# Patient Record
Sex: MALE | Race: WHITE | Employment: FULL TIME | URBAN - METROPOLITAN AREA
[De-identification: names, ages, dates, MRNs, and addresses within clinical notes are randomized per-mention and may not be internally consistent; named-entity substitution may affect disease eponyms.]

---

## 2017-01-05 RX ORDER — ALLOPURINOL 100 MG/1
100 TABLET ORAL DAILY
Qty: 30 TAB | Refills: 0 | Status: SHIPPED | OUTPATIENT
Start: 2017-01-05 | End: 2017-02-20 | Stop reason: SDUPTHER

## 2017-01-05 RX ORDER — COLCHICINE 0.6 MG/1
0.6 TABLET ORAL DAILY
Qty: 30 TAB | Refills: 0 | Status: SHIPPED | OUTPATIENT
Start: 2017-01-05 | End: 2017-02-20 | Stop reason: SDUPTHER

## 2017-01-05 NOTE — TELEPHONE ENCOUNTER
Patient came in the office to let us know his gout pain is almost fully resolved-walking without difficulty expect for a sore spot. Plans on returning to work tomorrow.  He wants the Rx's for gout prevention that Dr Rosanne Owen discussed and his recent visit sent to [facility] on [address]

## 2017-04-29 DIAGNOSIS — I10 ESSENTIAL HYPERTENSION: ICD-10-CM

## 2017-05-01 RX ORDER — LISINOPRIL AND HYDROCHLOROTHIAZIDE 12.5; 2 MG/1; MG/1
TABLET ORAL
Qty: 60 TAB | Refills: 0 | Status: SHIPPED | OUTPATIENT
Start: 2017-05-01 | End: 2017-07-01 | Stop reason: SDUPTHER

## 2017-05-01 RX ORDER — ALLOPURINOL 100 MG/1
TABLET ORAL
Qty: 30 TAB | Refills: 0 | Status: SHIPPED | OUTPATIENT
Start: 2017-05-01 | End: 2017-07-01 | Stop reason: SDUPTHER

## 2017-05-01 NOTE — TELEPHONE ENCOUNTER
rx done and My Chart message to patient to report his readings.  His uric acid level was done end of August. He was in the office in December for acute visit

## 2017-07-01 DIAGNOSIS — I10 ESSENTIAL HYPERTENSION: ICD-10-CM

## 2017-07-03 RX ORDER — LISINOPRIL AND HYDROCHLOROTHIAZIDE 12.5; 2 MG/1; MG/1
TABLET ORAL
Qty: 60 TAB | Refills: 1 | Status: SHIPPED | OUTPATIENT
Start: 2017-07-03 | End: 2017-09-18 | Stop reason: SDUPTHER

## 2017-07-03 RX ORDER — ALLOPURINOL 100 MG/1
TABLET ORAL
Qty: 30 TAB | Refills: 1 | Status: SHIPPED | OUTPATIENT
Start: 2017-07-03 | End: 2017-09-18 | Stop reason: SDUPTHER

## 2017-07-03 NOTE — TELEPHONE ENCOUNTER
He is due for recheck on his uric acid level by the end of August- he is due for his every other year CHP this year and can schedule early AM appt and do everything at onc visit

## 2017-07-04 ENCOUNTER — HOSPITAL ENCOUNTER (EMERGENCY)
Age: 42
Discharge: HOME | End: 2017-07-04
Attending: EMERGENCY MEDICINE
Payer: COMMERCIAL

## 2017-07-04 VITALS
SYSTOLIC BLOOD PRESSURE: 138 MMHG | BODY MASS INDEX: 34.21 KG/M2 | HEIGHT: 74 IN | HEART RATE: 81 BPM | DIASTOLIC BLOOD PRESSURE: 95 MMHG | TEMPERATURE: 97.7 F | OXYGEN SATURATION: 99 % | RESPIRATION RATE: 16 BRPM | WEIGHT: 266.54 LBS

## 2017-07-04 DIAGNOSIS — M76.61 TENDONITIS, ACHILLES, RIGHT: Primary | ICD-10-CM

## 2017-07-04 PROCEDURE — 99282 EMERGENCY DEPT VISIT SF MDM: CPT

## 2017-07-04 RX ORDER — ACETAMINOPHEN AND CODEINE PHOSPHATE 120; 12 MG/5ML; MG/5ML
5-10 SOLUTION ORAL
Qty: 240 ML | Refills: 0 | Status: SHIPPED | OUTPATIENT
Start: 2017-07-04 | End: 2017-09-05 | Stop reason: ALTCHOICE

## 2017-07-04 NOTE — ED PROVIDER NOTES
HPI Comments: Jumana Damon is a 42 y.o. male with a pertinent PMHx of HTN and Gout who presents ambulatory to the ED c/o right ankle pain x 4 days. Pt notes that he has been trying to rest his right foot and has been taking Allopurinol and Colchine with no relief of symptoms. Pt explains that he has had a gout flare in his right foot before, but his current pain is different from his last gout flare. Pt notes that he had 2 gout flares in the past few months. Pt defers crutches at this time noting that he has them at home if needed. Pt denies any allergies to medications. Pt specifically denies cold symptoms, urinary symptoms, nor recent fall or trauma. Social hx: - Tobacco use, +(occ) EtOH use, - Illicit drug use    PCP: Oz Constantino MD  Orth[facility]: [facility]    There are no other complaints, changes or physical findings at this time. The history is provided by the patient. No  was used. Past Medical History:   Diagnosis Date    Advance directive discussed with patient 10/22/2015    IBS (irritable bowel syndrome)     Knee pain, acute 10/02/14     [facility]    Ruptured tendon 2/2014    left distal biceps tendon (WC injury)       Past Surgical History:   Procedure Laterality Date    HX ORTHOPAEDIC Left 1979    foot injury         Family History:   Problem Relation Age of Onset    Cancer Maternal Grandmother        Social History     Social History    Marital status:      Spouse name: N/A    Number of children: N/A    Years of education: N/A     Occupational History    Not on file.      Social History Main Topics    Smoking status: Never Smoker    Smokeless tobacco: Never Used    Alcohol use Yes      Comment: ; weekends    Drug use: No    Sexual activity: Not on file     Other Topics Concern    Not on file     Social History Narrative         ALLERGIES: Review of patient's allergies indicates no known allergies. Review of Systems   Constitutional: Negative. HENT: Negative for rhinorrhea, sinus pressure and sore throat. Eyes: Negative. Respiratory: Negative for cough. Gastrointestinal: Negative. Endocrine: Negative. Genitourinary: Negative for decreased urine volume, difficulty urinating, dysuria, frequency and urgency. Musculoskeletal: Positive for myalgias. Skin: Negative. Allergic/Immunologic: Negative. Neurological: Negative. Psychiatric/Behavioral: Negative. Vitals:    07/04/17 0935   BP: (!) 138/95   Pulse: 81   Resp: 16   Temp: 97.7 °F (36.5 °C)   SpO2: 99%   Weight: 120.9 kg (266 lb 8.6 oz)   Height: 6' 2\" (1.88 m)            Physical Exam   Constitutional: He is oriented to person, place, and time. He appears well-developed and well-nourished. No distress. 41 yo  male   HENT:   Head: Normocephalic and atraumatic. Eyes: Conjunctivae are normal. Right eye exhibits no discharge. Left eye exhibits no discharge. Neck: Normal range of motion. Neck supple. Cardiovascular: Normal rate, regular rhythm and normal heart sounds. No murmur heard. Pulmonary/Chest: Effort normal and breath sounds normal. No respiratory distress. He has no wheezes. Musculoskeletal:   R FOOT: No swelling, ecchymosis or deformity. TTP to the insertion of the Neetu's tendon on the calcaneous with pain on plantar flexion. Otherwise, NT and painless ROM. Distal NV intact. Cap refill < 2 sec. Ambualtory without limp. Neurological: He is alert and oriented to person, place, and time. Skin: Skin is warm and dry. He is not diaphoretic. Psychiatric: He has a normal mood and affect. His behavior is normal.   Nursing note and vitals reviewed.        MDM  Number of Diagnoses or Management Options  Tendonitis, Achilles, right:   Diagnosis management comments: DDx: tendinitis, gout, strain, sprain, Calcific tendinitis       Amount and/or Complexity of Data Reviewed  Review and summarize past medical records: yes    Patient Progress  Patient progress: stable    ED Course       Procedures    IMPRESSION:  1. Tendonitis, Achilles, right        PLAN:  1. Patient was advised to rest and elevate his right foot while applying apply ice to the affected area. Patient was instructed to limit his physical activity, and take OTC anti-inflammatory medications to relieve his symptoms. Discharge Medication List as of 7/4/2017  9:50 AM      START taking these medications    Details   ACETAMINOPHEN WITH CODEINE (ACETAMINOPHEN-CODEINE) 120-12 mg/5 mL soln Take 5-10 mL by mouth every four (4) hours as needed. Max Daily Amount: 60 mL., Print, Disp-240 mL, R-0         CONTINUE these medications which have NOT CHANGED    Details   lisinopril-hydroCHLOROthiazide (PRINZIDE, ZESTORETIC) 20-12.5 mg per tablet TAKE 1 TABLET BY MOUTH TWICE DAILY, Normal, Disp-60 Tab, R-1      allopurinol (ZYLOPRIM) 100 mg tablet TAKE 1 TABLET BY MOUTH DAILY, Normal, Disp-30 Tab, R-1      colchicine 0.6 mg tablet TAKE 1 TABLET BY MOUTH DAILY, Normal, Disp-30 Tab, R-5           2. Follow-up Information     Follow up With Details Comments [facility], MD In 1 week As needed [facility]  [address] [place] [address]      Raghu Hopson MD In 1 week As needed [place] [phone]          Return to ED if worse     DISCHARGE NOTE  9:5 AM  The patient has been re-evaluated and is ready for discharge. Reviewed available results with patient. Counseled patient on diagnosis and care plan. Patient has expressed understanding, and all questions have been answered. Patient agrees with plan and agrees to follow up as recommended, or return to the ED if their symptoms worsen.  Discharge instructions have been provided and explained to the patient, along with reasons to return to the ED.    ATTESTATION:  This note is prepared by Kim Seth, acting as Scribe for RUSH Han PA-C: The scribe's documentation has been prepared under my direction and personally reviewed by me in its entirety. I confirm that the note above accurately reflects all work, treatment, procedures, and medical decision making performed by me.

## 2017-07-04 NOTE — LETTER
[address] 
[facility] EMERGENCY DEPT 
[address] [place] [address] [address] [phone] 
[phone] Work/School Note Date: 7/4/2017 To Whom It May concern: 
 
Stephan Santos was seen and treated today in the emergency room by the following provider(s): 
Attending Provider: Nick Harp MD 
Physician Assistant: SANDY Chang. Please excuse Stephan Santos from work tomorrow. Sincerely, Lu Chang

## 2017-07-04 NOTE — DISCHARGE INSTRUCTIONS
Tendon Injury (Tendinopathy): Care Instructions  Your Care Instructions  Tendons are tough, flexible tissues that connect muscle to bone. A tendon can hurt or get torn from overuse or aging, especially tendons in the shoulder, elbow, wrist, hip, knee, or ankle. Tendon injuries may be called tendinopathy or tendinitis. Tendon injuries can occur from any motion you have to repeat in a job, sports, or daily activities. Tennis elbow is one common tendon injury. You can treat most tendon problems with over-the-counter pain medicine, rest, changes in your activities, and physical therapy. Follow-up care is a key part of your treatment and safety. Be sure to make and go to all appointments, and call your doctor if you are having problems. Its also a good idea to know your test results and keep a list of the medicines you take. How can you care for yourself at home? · Rest the sore area. You may have to stop doing the activity that caused the tendon pain for a while. · Take an over-the-counter pain medicine, such as acetaminophen (Tylenol), ibuprofen (Advil, Motrin), or naproxen (Aleve). Read and follow all instructions on the label. · Do not take two or more pain medicines at the same time unless the doctor told you to. Many pain medicines have acetaminophen, which is Tylenol. Too much acetaminophen (Tylenol) can be harmful. · Put ice or a cold pack on the sore area for 10 to 20 minutes at a time. Try to do this every 1 to 2 hours for the next 3 days (when you are awake) or until any swelling goes down. Put a thin cloth between the ice and your skin. · Prop up the sore area on a pillow when you ice it or anytime you sit or lie down during the next 3 days. Try to keep it above the level of your heart. This will help reduce swelling.   · Follow your doctor's advice for wearing and caring for a sling, splint, or cast. In some cases, you may wear one of these for a while to help your tendon heal.  · Follow your doctor's advice for stretching and physical therapy. Gently move your joint through its full range of motion. This will prevent stiffness in your joint. · Go back to your activity slowly. Warm up before and stretch after the activity. You also can try making some changes. For example, if a sport caused your tendon pain, alternate the sport with another activity. If using a tool causes pain, switch hands or change your . Stop the activity if it hurts. After the activity, apply ice to prevent pain and swelling. · Do not smoke. Smoking can slow healing. If you need help quitting, talk to your doctor about stop-smoking programs and medicines. These can increase your chances of quitting for good. When should you call for help? Watch closely for changes in your health, and be sure to contact your doctor if:  · Your pain gets worse. · You do not get better as expected. Where can you learn more? Go to http://yany-seble.info/. Enter A157 in the search box to learn more about \"Tendon Injury (Tendinopathy): Care Instructions. \"  Current as of: March 21, 2017  Content Version: 11.3  © 9401-5839 [employer]. Care instructions adapted under license by [employer] (which disclaims liability or warranty for this information). If you have questions about a medical condition or this instruction, always ask your healthcare professional. [address] any warranty or liability for your use of this information.

## 2017-07-05 ENCOUNTER — TELEPHONE (OUTPATIENT)
Dept: FAMILY MEDICINE CLINIC | Age: 42
End: 2017-07-05

## 2017-07-05 DIAGNOSIS — M76.60 ACHILLES TENDON PAIN: Primary | ICD-10-CM

## 2017-07-05 NOTE — TELEPHONE ENCOUNTER
Patient went to the ED yesterday for foot pain from achilles tendon issue. They gave a work note to excuse for 1 day. He is still in pain-advised to go to [facility] per ED recommendations. They need to see and evaluate and also give him extension on his work note. We are unable to see today to give extension on his work note since PCP is out of the office.

## 2017-07-29 ENCOUNTER — HOSPITAL ENCOUNTER (EMERGENCY)
Age: 42
Discharge: HOME | End: 2017-07-30
Attending: EMERGENCY MEDICINE | Admitting: EMERGENCY MEDICINE
Payer: COMMERCIAL

## 2017-07-29 VITALS
OXYGEN SATURATION: 100 % | WEIGHT: 269.4 LBS | HEIGHT: 74 IN | TEMPERATURE: 98.7 F | DIASTOLIC BLOOD PRESSURE: 89 MMHG | SYSTOLIC BLOOD PRESSURE: 153 MMHG | RESPIRATION RATE: 18 BRPM | HEART RATE: 95 BPM | BODY MASS INDEX: 34.57 KG/M2

## 2017-07-29 DIAGNOSIS — S01.511A LACERATION OF UPPER LIP, COMPLICATED, INITIAL ENCOUNTER: ICD-10-CM
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
## 2017-07-29 DIAGNOSIS — Z23 NEED FOR TETANUS BOOSTER: ICD-10-CM

## 2017-07-29 PROCEDURE — 77030002888 HC SUT CHRMC J&J -A

## 2017-07-29 PROCEDURE — 77030018836 HC SOL IRR NACL ICUM -A

## 2017-07-29 PROCEDURE — 99282 EMERGENCY DEPT VISIT SF MDM: CPT

## 2017-07-29 PROCEDURE — 90471 IMMUNIZATION ADMIN: CPT

## 2017-07-29 PROCEDURE — 75810000293 HC SIMP/SUPERF WND  RPR

## 2017-07-29 NOTE — LETTER
[address] 
[facility] EMER[facility]Y [facility] 
[address] [place] [address]. [address] [phone] Work/School Note Date: 7/29/2017 To Whom It May concern: 
 
Ja Lorenzana was seen and treated today in the emergency room by the following provider(s): 
Attending Provider: Rhys Kaplan MD 
Physician Assistant: SANDY Eddy. Ja Lorenzana may return to work on 8/2/17 or sooner, if feeling better. Sincerely, SANDY Eddy

## 2017-07-30 PROCEDURE — 74011000250 HC RX REV CODE- 250: Performed by: PHYSICIAN ASSISTANT

## 2017-07-30 PROCEDURE — 74011250637 HC RX REV CODE- 250/637

## 2017-07-30 PROCEDURE — 74011250636 HC RX REV CODE- 250/636: Performed by: PHYSICIAN ASSISTANT

## 2017-07-30 PROCEDURE — 90715 TDAP VACCINE 7 YRS/> IM: CPT | Performed by: PHYSICIAN ASSISTANT

## 2017-07-30 RX ORDER — TRIPROLIDINE/PSEUDOEPHEDRINE 2.5MG-60MG
600 TABLET ORAL
Qty: 1 BOTTLE | Refills: 0 | Status: SHIPPED | OUTPATIENT
Start: 2017-07-30 | End: 2017-09-05 | Stop reason: ALTCHOICE

## 2017-07-30 RX ORDER — AMOXICILLIN AND CLAVULANATE POTASSIUM 400; 57 MG/5ML; MG/5ML
875 POWDER, FOR SUSPENSION ORAL 2 TIMES DAILY
Qty: 154 ML | Refills: 0 | Status: SHIPPED | OUTPATIENT
Start: 2017-07-30 | End: 2017-09-05 | Stop reason: ALTCHOICE

## 2017-07-30 RX ADMIN — NEOMYCIN AND POLYMYXIN B SULFATES AND BACITRACIN ZINC 1 PACKET: 400; 3.5; 5 OINTMENT TOPICAL at 01:41

## 2017-07-30 RX ADMIN — TETANUS TOXOID, REDUCED DIPHTHERIA TOXOID AND ACELLULAR PERTUSSIS VACCINE, ADSORBED 0.5 ML: 5; 2.5; 8; 8; 2.5 SUSPENSION INTRAMUSCULAR at 01:30

## 2017-07-30 RX ADMIN — Medication 2 ML: at 01:32

## 2017-07-30 NOTE — ED PROVIDER NOTES
HPI Comments: Nicholas Jack, 42 y.o. male, with PMHx of IBS presents ambulatory to ED [facility] ED with cc of wound to upper lip. Pt states that he was sleeping and accidentally rolled over on his dog's tail, and that the dog bit his upper lip area approximately 20min PTA. Pt notes that his dog is UTD on all his vaccinations. Pt notes that he is not UTD on his tetanus shot. NKDA. Pt denies any significant health issues. Pt denies any other pain. PCP: Jerome Goldberg MD    Social history significant for: - Tobacco, + EtOH, - Illicit Drug Use    There are no other complaints, changes, or physical findings at this time. The history is provided by the patient. No  was used. Past Medical History:   Diagnosis Date    Advance directive discussed with patient 10/22/2015    IBS (irritable bowel syndrome)     Knee pain, acute 10/02/14     prepatellar bursitis [facility] 10/09/14    Ruptured tendon 2/2014    left distal biceps tendon (WC injury)       Past Surgical History:   Procedure Laterality Date    HX ORTHOPAEDIC Left 1979    foot injury         Family History:   Problem Relation Age of Onset    Cancer Maternal Grandmother        Social History     Social History    Marital status:      Spouse name: N/A    Number of children: N/A    Years of education: N/A     Occupational History    Not on file. Social History Main Topics    Smoking status: Never Smoker    Smokeless tobacco: Never Used    Alcohol use Yes      Comment: ; weekends    Drug use: No    Sexual activity: Not on file     Other Topics Concern    Not on file     Social History Narrative         ALLERGIES: Review of patient's allergies indicates no known allergies. Review of Systems   Constitutional: Negative. Negative for fever. HENT: Negative. Eyes: Negative. Respiratory: Negative. Cardiovascular: Negative. Gastrointestinal: Negative.   Negative for constipation, diarrhea, nausea and vomiting. Denies liver disease   Endocrine:        Denies thyroid disease   Genitourinary: Negative. Negative for dysuria. Denies kidney disease   Musculoskeletal: Negative. Skin: Positive for wound (upper lip). Neurological: Negative. All other systems reviewed and are negative. Patient Vitals for the past 12 hrs:   Temp Pulse Resp BP SpO2   07/29/17 2243 98.7 °F (37.1 °C) 95 18 153/89 100 %     Physical Exam   Constitutional: He is oriented to person, place, and time. He appears well-developed and well-nourished. No distress. HENT:   Head: Normocephalic and atraumatic. Right Ear: External ear normal.   Left Ear: External ear normal.   Nose: Nose normal.   Mouth/Throat: Oropharynx is clear and moist. No oropharyngeal exudate. Eyes: Conjunctivae and EOM are normal. Pupils are equal, round, and reactive to light. Right eye exhibits no discharge. Left eye exhibits no discharge. No scleral icterus. Neck: Normal range of motion. Neck supple. No tracheal deviation present. Cardiovascular: Normal rate, regular rhythm, normal heart sounds and intact distal pulses. Exam reveals no gallop and no friction rub. No murmur heard. Pulmonary/Chest: Effort normal and breath sounds normal. No respiratory distress. He has no wheezes. He has no rales. He exhibits no tenderness. Abdominal: Soft. Bowel sounds are normal. He exhibits no distension and no mass. There is no tenderness. There is no rebound and no guarding. Musculoskeletal: He exhibits no edema or tenderness. Lymphadenopathy:     He has no cervical adenopathy. Neurological: He is alert and oriented to person, place, and time. No cranial nerve deficit. Coordination normal.   Skin: Skin is warm and dry. No rash noted.  No erythema.   + V shaped laceration to left upper lip, does cross vermilion border  + Vertical linear laceration of left upper lip, adjacent to the V-shaped laceration   Psychiatric: He has a normal mood and affect. His behavior is normal. Judgment and thought content normal.   Nursing note and vitals reviewed. MDM  Number of Diagnoses or Management Options  Diagnosis management comments: DDx: dog bite, needs tetanus booster       Amount and/or Complexity of Data Reviewed  Review and summarize past medical records: yes    Patient Progress  Patient progress: stable    ED Course       Procedures    Procedure Note - Laceration Repair:  2:19 AM  Procedure by Harlee Hamman PA-C  Complexity: complex   1.5 cm v-shaped laceration to left upper lip was irrigated copiously with NS under jet lavage, prepped with Hibiclens and draped in a sterile fashion. The area was anesthetized with 2 mLs of  LET via topical application. The wound was explored with the following results: No foreign bodies found. The wound was repaired with One layer suture closure: Skin Layer:  7 sutures placed, stitch type:simple interrupted, suture: 6-0 plain gut. .  The wound was closed with good hemostasis and approximation. Sterile dressing applied. Vermillion border intact: no   Estimated blood loss: less than 1mL  The procedure took 1-15 minutes, and pt tolerated well. Written by Laurel Lopez ED Scribe, as dictated by Harlee Hamman PA-C. Procedure Note - Laceration Repair:  2:19 AM  Procedure by Harlee Hamman PA-C  Complexity: complex   7mm linear laceration to left upper lip was irrigated copiously with NS under jet lavage, prepped with Hibiclens and draped in a sterile fashion. The area was anesthetized with 2 mLs of  LET via topical application. The wound was explored with the following results: No foreign bodies found. The wound was repaired with One layer suture closure: Skin Layer:  7 sutures placed, stitch type:simple interrupted, suture: 6-0 plain gut. The wound was closed with good hemostasis and approximation. Sterile dressing applied.   Vermillion border intact: no   Estimated blood loss: less than 1mL  The procedure took 1-15 minutes, and pt tolerated well. Written by Kristy Rollins, ED Scribe, as dictated by Ceci Glasgow PA-C.        MEDICATIONS GIVEN:  Medications   lidocaine/EPINEPHrine/tetracaine (LET) topical solution soln (  Canceled Entry 7/30/17 0141)   diph,Pertuss(AC),Tet Vac-PF (BOOSTRIX) 2.5-8-5 Lf-mcg suspension (  Canceled Entry 7/30/17 0141)   neomycin-bacitracnZn-polymyxnB (NEOSPORIN) ointment 1 Packet (1 Packet Topical Given by Provider 7/30/17 0141)   diph,Pertuss(AC),Tet Vac-PF (BOOSTRIX) suspension 0.5 mL (0.5 mL IntraMUSCular Given 7/30/17 0130)   lidocaine/EPINEPHrine/tetracaine (LET) topical soln (2 mL Topical Given by Provider 7/30/17 0132)       IMPRESSION:  1. Dog bite, initial encounter    2. Laceration of upper lip, complicated, initial encounter    3. Need for tetanus booster        PLAN:  1. Current Discharge Medication List      START taking these medications    Details   neomycin-bacitracin-polymyxin (NEOSPORIN, [id number],) 3.5mg-400 unit- 5,000 unit/gram ointment Apply  to affected area three (3) times daily for 10 days. Apply to affected area  Qty: 1 Tube, Refills: 0      amoxicillin-clavulanate (AUGMENTIN) 400-57 mg/5 mL suspension Take 10.9 mL by mouth two (2) times a day. Take at the first sign of infection (redness/heat/pus)  Indications: DOG BITE WOUND  Qty: 154 mL, Refills: 0      ibuprofen (CHILDREN'S MOTRIN) 100 mg/5 mL suspension Take 30 mL by mouth four (4) times daily as needed for Fever. Qty: 1 Bottle, Refills: 0           2. Follow-up Information     None        Return to ED if worse     Discharge Note:  2:51 AM  The pt is ready for discharge. The pt's signs, symptoms, diagnosis, and discharge instructions have been discussed and pt has conveyed their understanding. The pt is to follow up as recommended or return to ER should their symptoms worsen. Plan has been discussed and pt is in agreement.     This note is prepared by Yadira Hester, acting as a Scribe for Tin Black Neeta Jacobson. Scott Holliday PA-C: The scribe's documentation has been prepared under my direction and personally reviewed by me in its entirety. I confirm that the notes above accurately reflects all work, treatment, procedures, and medical decision making performed by me.

## 2017-07-30 NOTE — DISCHARGE INSTRUCTIONS
DTaP (Diphtheria, Tetanus, Pertussis) Vaccine: What You Need to Know  Why get vaccinated? Diphtheria, tetanus, and pertussis are serious diseases caused by bacteria. Diphtheria and pertussis are spread from person to person. Tetanus enters the body through cuts or wounds. DIPHTHERIA causes a thick covering in the back of the throat. · It can lead to breathing problems, paralysis, heart failure, and even death. TETANUS (Lockjaw) causes painful tightening of the muscles, usually all over the body. · It can lead to \"locking\" of the jaw so the victim cannot open his mouth or swallow. Tetanus leads to death in up to 2 out of 10 cases. PERTUSSIS (Whooping Cough) causes coughing spells so bad that it is hard for infants to eat, drink, or breathe. These spells can last for weeks. · It can lead to pneumonia, seizures (jerking and staring spells), brain damage, and death. Diphtheria, tetanus, and pertussis vaccine (DTaP) can help prevent these diseases. Most children who are vaccinated with DTaP will be protected throughout childhood. Many more children would get these diseases if we stopped vaccinating. DTaP is a safer version of an older vaccine called DTP. DTP is no longer used in the [place]. Who should get DTaP vaccine and when? Children should get 5 doses of DTaP vaccine, one dose at each of the following ages:  · 2 months  · 4 months  · 6 months  · 15-18 months  · 4-6 years  DTaP may be given at the same time as other vaccines. Some children should not get DTaP vaccine or should wait. · Children with minor illnesses, such as a cold, may be vaccinated. But children who are moderately or severely ill should usually wait until they recover before getting DTaP vaccine. · Any child who had a life-threatening allergic reaction after a dose of DTaP should not get another dose.   · Any child who suffered a brain or nervous system disease within 7 days after a dose of DTaP should not get another dose.  · Talk with your doctor if your child:  Angeli Gaines Had a seizure or collapsed after a dose of DTaP. ¨ Cried non-stop for 3 hours or more after a dose of DTaP. ¨ Had a fever over 105°F after a dose of DTaP. Ask your doctor for more information. Some of these children should not get another dose of pertussis vaccine, but may get a vaccine without pertussis, called DT. Older children and adults  DTaP is not licensed for adolescents, adults, or children 9years of age and older. But older people still need protection. A vaccine called Tdap is similar to DTaP. A single dose of Tdap is recommended for people 11 through 59years of age. Another vaccine, called Td, protects against tetanus and diphtheria, but not pertussis. It is recommended every 10 years. There are separate Vaccine Information Statements for these vaccines. What are the risks from DTaP vaccine? Getting diphtheria, tetanus, or pertussis disease is much riskier than getting DTaP vaccine. However, a vaccine, like any medicine, is capable of causing serious problems, such as severe allergic reactions. The risk of DTaP vaccine causing serious harm, or death, is extremely small. Mild Problems (Common)  · Fever (up to about 1 child in 4)  · Redness or swelling where the shot was given (up to about 1 child in 4)  · Soreness or tenderness where the shot was given (up to about 1 child in 4)  These problems occur more often after the 4th and 5th doses of the DTaP series than after earlier doses. Sometimes the 4th or 5th dose of DTaP vaccine is followed by swelling of the entire arm or leg in which the shot was given, lasting 1-7 days (up to about 1 child in 27). Other mild problems include:  · Fussiness (up to about 1 child in 3)  · Tiredness or poor appetite (up to about 1 child in 10)  · Vomiting (up to about 1 child in 48)  These problems generally occur 1-3 days after the shot.   Moderate Problems (Uncommon)  · Seizure (jerking or staring) (about 1 child out of 14,000)  · Non-stop crying, for 3 hours or more (up to about 1 child out of 1,000)  · High fever, over 105°F (about 1 child out of 16,000)  Severe Problems (Very Rare)  · Serious allergic reaction (less than 1 out of a million doses)  · Several other severe problems have been reported after DTaP vaccine. These include:  ¨ Long-term seizures, coma, or lowered consciousness. ¨ Permanent brain damage. These are so rare it is hard to tell if they are caused by the vaccine. Controlling fever is especially important for children who have had seizures, for any reason. It is also important if another family member has had seizures. You can reduce fever and pain by giving your child an aspirin-free pain reliever when the shot is given, and for the next 24 hours, following the package instructions. What if there is a serious reaction? What should I look for? · Look for anything that concerns you, such as signs of a severe allergic reaction, very high fever, or behavior changes. Signs of a severe allergic reaction can include hives, swelling of the face and throat, difficulty breathing, a fast heartbeat, dizziness, and weakness. These would start a few minutes to a few hours after the vaccination. What should I do? · If you think it is a severe allergic reaction or other emergency that can't wait, call 9-1-1 or get the person to the nearest hospital. Otherwise, call your doctor. · Afterward, the reaction should be reported to the Vaccine Adverse Event Reporting System (VAERS). Your doctor might file this report, or you can do it yourself through the VAERS web site at www.vaers. hhs.gov, or by calling 0-211.601.7012. VAERS is only for reporting reactions. They do not give medical advice. The National Vaccine Injury Compensation Program  The National Vaccine Injury Compensation Program (VICP) is a federal program that was created to compensate people who may have been injured by certain vaccines.   Persons who believe they may have been injured by a vaccine can learn about the program and about filing a claim by calling 6-156.776.4164 or visiting the [address] [employer] website at www.[facility].gov/vaccinecompensation. How can I learn more? · Ask your doctor. · Call your local or state health department. · Contact the Centers for Disease Control and Prevention (CDC):  ¨ Call 2-145.274.4048 (1-800-CDC-INFO) or  ¨ Visit CDC's website at www.cdc.gov/vaccines  Vaccine Information Statement  DTaP (Tetanus, Diphtheria, Pertussis ) Vaccine  (5/17/2007)  [address] [id number]  Department of Health and Human Services  Centers for Disease Control and Prevention  Many Vaccine Information Statements are available in Mongolian and other languages. See www.immunize.org/vis. Muchas hojas de información sobre vacunas están disponibles en español y en otros idiomas. Visite www.immunize.org/vis. Care instructions adapted under license by [employer] (which disclaims liability or warranty for this information). If you have questions about a medical condition or this instruction, always ask your healthcare professional. [place] [address] any warranty or liability for your use of this information. Animal Bites: Care Instructions  Your Care Instructions  After an animal bite, the biggest concern is infection. The chance of infection depends on the type of animal that bit you, where on your body you were bitten, and your general health. Many animal bites are not closed with stitches, because this can increase the chance of infection. Your bite may take as little as 7 days or as long as several months to heal, depending on how bad it is. Taking good care of your wound at home will help it heal and reduce your chance of infection. The doctor has checked you carefully, but problems can develop later. If you notice any problems or new symptoms, get medical treatment right away.   Follow-up care is a key part of your treatment and safety. Be sure to make and go to all appointments, and call your doctor if you are having problems. It's also a good idea to know your test results and keep a list of the medicines you take. How can you care for yourself at home? · If your doctor told you how to care for your wound, follow your doctor's instructions. If you did not get instructions, follow this general advice:  ¨ After 24 to 48 hours, gently wash the wound with clean water 2 times a day. Do not scrub or soak the wound. Don't use hydrogen peroxide or alcohol, which can slow healing. ¨ You may cover the wound with a thin layer of petroleum jelly, such as Vaseline, and a nonstick bandage. ¨ Apply more petroleum jelly and replace the bandage as needed. · After you shower, gently dry the wound with a clean towel. · If your doctor has closed the wound, cover the bandage with a plastic bag before you take a shower. · A small amount of skin redness and swelling around the wound edges and the stitches or staples is normal. Your wound may itch or feel irritated. Do not scratch or rub the wound. · Ask your doctor if you can take an over-the-counter pain medicine, such as acetaminophen (Tylenol), ibuprofen (Advil, Motrin), or naproxen (Aleve). Read and follow all instructions on the label. · Do not take two or more pain medicines at the same time unless the doctor told you to. Many pain medicines have acetaminophen, which is Tylenol. Too much acetaminophen (Tylenol) can be harmful. · If your bite puts you at risk for rabies, you will get a series of shots over the next few weeks to prevent rabies. Your doctor will tell you when to get the shots. It is very important that you get the full cycle of shots. Follow your doctor's instructions exactly. · You may need a tetanus shot if you have not received one in the last 5 years. · If your doctor prescribed antibiotics, take them as directed. Do not stop taking them just because you feel better.  You need to take the full course of antibiotics. When should you call for help? Call your doctor now or seek immediate medical care if:  · The skin near the bite turns cold or pale or it changes color. · You lose feeling in the area near the bite, or it feels numb or tingly. · You have trouble moving a limb near the bite. · You have symptoms of infection, such as:  ¨ Increased pain, swelling, warmth, or redness near the wound. ¨ Red streaks leading from the wound. ¨ Pus draining from the wound. ¨ A fever. · Blood soaks through the bandage. Oozing small amounts of blood is normal.  · Your pain is getting worse. Watch closely for changes in your health, and be sure to contact your doctor if you are not getting better as expected. Where can you learn more? Go to http://yany-seble.info/. Enter U571 in the search box to learn more about \"Animal Bites: Care Instructions. \"  Current as of: March 20, 2017  Content Version: 11.3  © 1174-8273 [employer]. Care instructions adapted under license by [employer] (which disclaims liability or warranty for this information). If you have questions about a medical condition or this instruction, always ask your healthcare professional. [address] any warranty or liability for your use of this information.

## 2017-07-30 NOTE — ED NOTES
Assumed care of pt from triage. Pt complaining of upper lip dog bite. Pt was bitten by his own dog and dog is UTD on vaccines. Pt did not notify animal control. Will notify [address]. Pt has hx of HTN and gout. Pt in no acute distress at this time. Call bell within reach.

## 2017-07-30 NOTE — ED NOTES
Notified [facility] of dog bite. Spoke with dispatcher. Animal control off duty at this time. Dispatcher took report and [place] [employer] to follow up with pt tomorrow.

## 2017-09-05 ENCOUNTER — OFFICE VISIT (OUTPATIENT)
Dept: FAMILY MEDICINE CLINIC | Age: 42
End: 2017-09-05

## 2017-09-05 VITALS
TEMPERATURE: 98.2 F | SYSTOLIC BLOOD PRESSURE: 133 MMHG | OXYGEN SATURATION: 97 % | RESPIRATION RATE: 18 BRPM | HEART RATE: 75 BPM | HEIGHT: 74 IN | DIASTOLIC BLOOD PRESSURE: 87 MMHG | WEIGHT: 270.5 LBS | BODY MASS INDEX: 34.72 KG/M2

## 2017-09-05 DIAGNOSIS — Z00.00 LABORATORY EXAM ORDERED AS PART OF ROUTINE GENERAL MEDICAL EXAMINATION: ICD-10-CM

## 2017-09-05 DIAGNOSIS — E66.9 OBESITY, CLASS I, BMI 30-34.9: ICD-10-CM

## 2017-09-05 DIAGNOSIS — R79.89 LOW VITAMIN D LEVEL: ICD-10-CM

## 2017-09-05 DIAGNOSIS — I10 ESSENTIAL HYPERTENSION: ICD-10-CM

## 2017-09-05 DIAGNOSIS — Z23 ENCOUNTER FOR IMMUNIZATION: ICD-10-CM

## 2017-09-05 DIAGNOSIS — Z00.00 PHYSICAL EXAM: Primary | ICD-10-CM

## 2017-09-05 DIAGNOSIS — M25.561 ACUTE PAIN OF RIGHT KNEE: ICD-10-CM

## 2017-09-05 RX ORDER — CHOLECALCIFEROL (VITAMIN D3) 125 MCG
440 CAPSULE ORAL AS NEEDED
COMMUNITY
End: 2017-09-13

## 2017-09-05 NOTE — MR AVS SNAPSHOT
Visit Information Date & Time Provider Department Dept. Phone Encounter #  
 9/5/2017  9:00 AM Juanita Pemberton MD [facility] [phone] [id number] Follow-up Instructions Return in about 1 year (around 9/5/2018) for Annual OV labs. Your Appointments 9/11/2017 12:00 PM  
New Patient with Huey Dinero PsyD [address] ([address]) Appt Note: NP/retest for ADHD/ pt made appt; [facility] [place] [facility] [address] [facility] [address] [facility] [address] [id number] [phone]  
  
   
 [facility] [address] [facility] [address] [address] [address] Upcoming Health Maintenance Date Due INFLUENZA AGE 9 TO ADULT 12/4/2017* DTaP/Tdap/Td series (2 - Td) 7/30/2027 *Topic was postponed. The date shown is not the original due date. Allergies as of 9/5/2017  Review Complete On: 9/5/2017 By: Juanita Pemberton MD  
 No Known Allergies Current Immunizations  Reviewed on 9/5/2017 Name Date Influenza Vaccine 10/17/2015 Influenza Vaccine (Quad) PF 10/20/2014 Tdap 7/30/2017  1:30 AM, 8/6/2015 Reviewed by Eugenio Bhatt RN on 9/5/2017 at  9:29 AM  
You Were Diagnosed With   
  
 Codes Comments Physical exam    -  Primary ICD-10-CM: Z00.00 ICD-9-CM: V70.9 Essential hypertension     ICD-10-CM: I10 
ICD-9-CM: 401.9 Obesity, Class I, BMI 30-34.9     ICD-10-CM: E66.9 ICD-9-CM: 278.00 Laboratory exam ordered as part of routine general medical examination     ICD-10-CM: Z00.00 ICD-9-CM: V72.62 Acute pain of right knee     ICD-10-CM: M25.561 ICD-9-CM: 719.46 Low vitamin D level     ICD-10-CM: E55.9 ICD-9-CM: 268.9 Vitals BP Pulse Temp Resp Height(growth percentile) Weight(growth percentile) 133/87 (BP 1 Location: Right arm, BP Patient Position: Sitting) 75 98.2 °F (36.8 °C) (Oral) 18 6' 2\" (1.88 m) 270 lb 8 oz (122.7 kg) SpO2 BMI Smoking Status 97% 34.73 kg/m2 Never Smoker Vitals History BMI and BSA Data Body Mass Index Body Surface Area 34.73 kg/m 2 2.53 m 2 Preferred Pharmacy Pharmacy Name Phone [facility] [address], [address] [place] [address] Drive [phone] Your Updated Medication List  
  
   
This list is accurate as of: 9/5/17 10:05 AM.  Always use your most recent med list.  
  
  
  
  
 ALEVE 220 mg Cap Generic drug:  naproxen sodium Take 440 mg by mouth as needed. allopurinol 100 mg tablet Commonly known as:  ZYLOPRIM  
TAKE 1 TABLET BY MOUTH DAILY  
  
 lisinopril-hydroCHLOROthiazide 20-12.5 mg per tablet Commonly known as:  PRINZIDE, ZESTORETIC  
TAKE 1 TABLET BY MOUTH TWICE DAILY We Performed the Following CBC WITH AUTOMATED DIFF [71206 CPT(R)] LIPID PANEL [04823 CPT(R)] METABOLIC PANEL, COMPREHENSIVE [56608 CPT(R)] REFERRAL TO ORTHOPEDICS [ATX777 Custom] Comments:  
 Please evaluate patient for right knee pain. TSH 3RD GENERATION [43453 CPT(R)] URINALYSIS W/ RFLX MICROSCOPIC [55244 CPT(R)] VITAMIN D, 25 HYDROXY [id number] CPT(R)] Follow-up Instructions Return in about 1 year (around 9/5/2018) for Annual OV labs. Referral Information Referral ID Referred By Referred To  
  
 [id number] DESEAN, [address] [place] [address] Phone: [phone] Visits Status Start Date End Date 1 New Request 9/5/17 9/5/18 If your referral has a status of pending review or denied, additional information will be sent to support the outcome of this decision. Introducing [facility]! Dear Tru Jon: 
Thank you for requesting a [employer] account. Our records indicate that you already have an active [employer] account. You can access your account anytime at [email or web address] Did you know that you can access your hospital and ER discharge instructions at any time in Rounds? You can also review all of your test results from your hospital stay or ER visit. Additional Information If you have questions, please visit the Frequently Asked Questions section of the [employer] website at [email or web address] Remember, Rounds is NOT to be used for urgent needs. For medical emergencies, dial 911. Now available from your iPhone and Android! Please provide this summary of care documentation to your next provider. Your primary care clinician is listed as [id number] If you have any questions after today's visit, please call [phone].

## 2017-09-05 NOTE — PROGRESS NOTES
Chief Complaint   Patient presents with    Complete Physical    Knee Pain     Right started about 1 week ago. Painful to bend and has tried to give away. 1. Have you been to the ER, urgent care clinic since your last visit? Hospitalized since your last visit? Yes [facility]17 [facility] Reason for visit: Dog bite- 11 stitches. 2. Have you seen or consulted any other health care providers outside of the [address] [place] [address] since your last visit? Include any pap smears or colon screening. No   The patient was counseled on the dangers of tobacco use, and Patient is a non smoker. Reviewed strategies to maximize success, including Continue not to smoke. Complete Physical Exam Male  Pre-Visit Questions:    1. Do you follow a low fat or low salt diet ? y  2. Do you follow an exercise program? n  3. Have you had your tetanus booster in the last 10 years? y  3. Have you ever had a Pneumonia vaccine? n  5. Do you smoke? n  6. Do you consider yourself overweight? y  7. Do you perform Testicular self exam?y  8. Is there a family history of CAD< age 48? n  5. Is there a family history of Cancer? y  8. Do you have any Cancer risks? y  6. Have you had a colonoscopy? n  12. Have you been to your eye doctor past year?   n  15. Have you been to your dentist in the last 6 months?  y  15. Have you had your flu shot for this season? Unsure  I have reviewed Health Maintenance with the patient and updated. Advance Care Planning information reviewed and given to the patient at a previous visit.

## 2017-09-05 NOTE — PROGRESS NOTES
HISTORY OF PRESENT ILLNESS  Ariel Pettit is a 42 y.o. male. HPI   Here for [facility]. Right knee bothering past 1-2 weeks. No known injury. Eye doctor 2 yrs. Dentist 1 x past yr. ER visit for upper lip laceration from dog bite 1-2 mos ago. Stitched and antibiotics. No other signif hx past yr. Last [facility] year ago about right knee. Patient Active Problem List   Diagnosis Code    Obesity, Class I, BMI 30-34.9 E66.9    Left-sided low back pain without sciatica M54.5    Ankle pain M25.579    Essential hypertension I10     Current Outpatient Prescriptions   Medication Sig Dispense Refill    naproxen sodium (ALEVE) 220 mg cap Take 440 mg by mouth as needed.  lisinopril-hydroCHLOROthiazide (PRINZIDE, ZESTORETIC) 20-12.5 mg per tablet TAKE 1 TABLET BY MOUTH TWICE DAILY 60 Tab 1    allopurinol (ZYLOPRIM) 100 mg tablet TAKE 1 TABLET BY MOUTH DAILY 30 Tab 1     No Known Allergies  Past Medical History:   Diagnosis Date    Advance directive discussed with patient 10/22/2015    IBS (irritable bowel syndrome)     Knee pain, acute 10/02/14     prepatellar bursitis Raynemaribel Bautistamariangel 10/09/14    Ruptured tendon 2/2014    left distal biceps tendon (WC injury)     Past Surgical History:   Procedure Laterality Date    EXTRACTION ERUPTED TOOTH/EXR  08/2017    Having teeth pulled and will have more dental work.     HX ORTHOPAEDIC Left 1979    foot injury     Family History   Problem Relation Age of Onset    Cancer Maternal Grandmother      Social History   Substance Use Topics    Smoking status: Never Smoker    Smokeless tobacco: Never Used    Alcohol use Yes      Comment: ; weekends      Lab Results  Component Value Date/Time   WBC 7.6 08/06/2015 11:29 AM   HGB 14.3 08/06/2015 11:29 AM   HCT 42.9 08/06/2015 11:29 AM   PLATELET 439 14/94/3414 11:29 AM   MCV 86 08/06/2015 11:29 AM     Lab Results  Component Value Date/Time   Glucose 88 08/06/2015 11:29 AM   LDL, calculated 110 08/06/2015 11:29 AM Creatinine 0.96 08/06/2015 11:29 AM      Lab Results  Component Value Date/Time   Cholesterol, total 188 08/06/2015 11:29 AM   HDL Cholesterol 26 08/06/2015 11:29 AM   LDL, calculated 110 08/06/2015 11:29 AM   Triglyceride 259 08/06/2015 11:29 AM   Lab Results  Component Value Date/Time   ALT (SGPT) 66 08/06/2015 11:29 AM   AST (SGOT) 31 08/06/2015 11:29 AM   Alk. phosphatase 77 08/06/2015 11:29 AM   Bilirubin, direct 0.1 04/16/2009 02:25 PM   Bilirubin, total 0.5 08/06/2015 11:29 AM   Albumin 4.6 08/06/2015 11:29 AM   Protein, total 7.0 08/06/2015 11:29 AM   PLATELET 732 78/91/6171 11:29 AM       Lab Results  Component Value Date/Time   GFR est non-AA 98 08/06/2015 11:29 AM   GFR est  08/06/2015 11:29 AM   Creatinine 0.96 08/06/2015 11:29 AM   BUN 24 08/06/2015 11:29 AM   Sodium 140 08/06/2015 11:29 AM   Potassium 4.2 08/06/2015 11:29 AM   Chloride 101 08/06/2015 11:29 AM   CO2 21 08/06/2015 11:29 AM   Lab Results  Component Value Date/Time   TSH 0.525 08/06/2015 11:29 AM      Lab Results   Component Value Date/Time    Glucose 88 08/06/2015 11:29 AM      Fasting for labs. Review of Systems   Constitutional: Negative. HENT: Negative. Eyes: Negative. Respiratory: Negative. Cardiovascular: Negative. Gastrointestinal: Negative. Genitourinary: Negative. Musculoskeletal: Positive for joint pain. Skin: Negative. Neurological: Negative. To get retested by neuro for ADHD this month. Endo/Heme/Allergies: Negative. Psychiatric/Behavioral: Negative. Physical Exam   Vitals:    09/05/17 0922   BP: 133/87   Pulse: 75   Resp: 18   Temp: 98.2 °F (36.8 °C)   TempSrc: Oral   SpO2: 97%   Weight: 270 lb 8 oz (122.7 kg)   Height: 6' 2\" (1.88 m)       General  alert, cooperative, no distress, appears stated age   Head  Normocephalic, without obvious abnormality, atraumatic   Eyes  conjunctivae/corneas clear. PERRL. Fundi benign   Ears  Canals and TMs clear   Nose Passages patent. Mucosa normal. No drainage or sinus tenderness. Throat Lips, mucosa, and tongue normal. Teeth and gums normal.  Post pharynx neg. Neck supple, nontender, no adenopathy, thyroid: not enlarged, no masses/nodules, no carotid bruits   Back   symmetric, no curvature. FROM. No CVA tenderness   Lungs   clear to auscultation bilaterally   Chest wall  no tenderness   Heart  regular rate and rhythm, no murmur, click, rub or gallop   Abdomen   Obese, soft, non-tender. Bowel sounds normal. No masses,  No organomegaly   Genitalia  Testes descended bilat w/o masses. No hernias   Rectal  Declined   Extremities extremities normal, atraumatic, no cyanosis or edema. Right knee no swelling warmth nor erythema. Pulses 2+ and symmetric   Skin No rashes or lesions       Neurologic Nml heel, toe. Unable Tandem gait. DTRs 2+ symmetric           ASSESSMENT and PLAN    ICD-10-CM ICD-9-CM    1. Physical exam Z00.00 V70.9 VITAMIN D, 25 HYDROXY      CBC WITH AUTOMATED DIFF      URINALYSIS W/ RFLX MICROSCOPIC      TSH 3RD GENERATION      METABOLIC PANEL, COMPREHENSIVE      LIPID PANEL   2. Essential hypertension I10 401.9 CBC WITH AUTOMATED DIFF      URINALYSIS W/ RFLX MICROSCOPIC      TSH 3RD GENERATION      METABOLIC PANEL, COMPREHENSIVE      LIPID PANEL   3. Obesity, Class I, BMI 30-34.9 E66.9 278.00 TSH 3RD GENERATION      METABOLIC PANEL, COMPREHENSIVE      LIPID PANEL   4. Laboratory exam ordered as part of routine general medical examination Z00.00 V72.62 VITAMIN D, 25 HYDROXY      CBC WITH AUTOMATED DIFF      URINALYSIS W/ RFLX MICROSCOPIC      TSH 3RD GENERATION      METABOLIC PANEL, COMPREHENSIVE      LIPID PANEL   5. Acute pain of right knee M25.561 719.46 REFERRAL TO ORTHOPEDICS   6. Low vitamin D level E55.9 268.9 VITAMIN D, 25 HYDROXY   7.  Encounter for immunization Z23 V03.89 INFLUENZA VIRUS VAC QUAD,SPLIT,PRESV FREE SYRINGE 3/> YRS IM      MN IMMUNIZ ADMIN,1 SINGLE/COMB VAC/TOXOID     Follow-up Disposition:  Return in about 1 year (around 9/5/2018) for Annual OV labs.

## 2017-09-06 LAB
25(OH)D3+25(OH)D2 SERPL-MCNC: 28.1 NG/ML (ref 30–100)
ALBUMIN SERPL-MCNC: 4.3 G/DL (ref 3.5–5.5)
ALBUMIN/GLOB SERPL: 1.7 {RATIO} (ref 1.2–2.2)
ALP SERPL-CCNC: 82 IU/L (ref 39–117)
ALT SERPL-CCNC: 45 IU/L (ref 0–44)
APPEARANCE UR: CLEAR
AST SERPL-CCNC: 33 IU/L (ref 0–40)
BASOPHILS # BLD AUTO: 0 X10E3/UL (ref 0–0.2)
BASOPHILS NFR BLD AUTO: 0 %
BILIRUB SERPL-MCNC: 0.4 MG/DL (ref 0–1.2)
BILIRUB UR QL STRIP: NEGATIVE
BUN SERPL-MCNC: 17 MG/DL (ref 6–24)
BUN/CREAT SERPL: 18 (ref 9–20)
CALCIUM SERPL-MCNC: 9.7 MG/DL (ref 8.7–10.2)
CHLORIDE SERPL-SCNC: 100 MMOL/L (ref 96–106)
CHOLEST SERPL-MCNC: 173 MG/DL (ref 100–199)
CO2 SERPL-SCNC: 24 MMOL/L (ref 18–29)
COLOR UR: YELLOW
CREAT SERPL-MCNC: 0.94 MG/DL (ref 0.76–1.27)
EOSINOPHIL # BLD AUTO: 0.1 X10E3/UL (ref 0–0.4)
EOSINOPHIL NFR BLD AUTO: 2 %
ERYTHROCYTE [DISTWIDTH] IN BLOOD BY AUTOMATED COUNT: 14.7 % (ref 12.3–15.4)
GLOBULIN SER CALC-MCNC: 2.6 G/DL (ref 1.5–4.5)
GLUCOSE SERPL-MCNC: 103 MG/DL (ref 65–99)
GLUCOSE UR QL: NEGATIVE
HCT VFR BLD AUTO: 38.5 % (ref 37.5–51)
HDLC SERPL-MCNC: 24 MG/DL
HGB BLD-MCNC: 13 G/DL (ref 12.6–17.7)
HGB UR QL STRIP: NEGATIVE
IMM GRANULOCYTES # BLD: 0 X10E3/UL (ref 0–0.1)
IMM GRANULOCYTES NFR BLD: 0 %
INTERPRETATION, 910389: NORMAL
KETONES UR QL STRIP: NEGATIVE
LDLC SERPL CALC-MCNC: 102 MG/DL (ref 0–99)
LEUKOCYTE ESTERASE UR QL STRIP: NEGATIVE
LYMPHOCYTES # BLD AUTO: 1.6 X10E3/UL (ref 0.7–3.1)
LYMPHOCYTES NFR BLD AUTO: 19 %
MCH RBC QN AUTO: 28.7 PG (ref 26.6–33)
MCHC RBC AUTO-ENTMCNC: 33.8 G/DL (ref 31.5–35.7)
MCV RBC AUTO: 85 FL (ref 79–97)
MICRO URNS: NORMAL
MONOCYTES # BLD AUTO: 0.5 X10E3/UL (ref 0.1–0.9)
MONOCYTES NFR BLD AUTO: 6 %
NEUTROPHILS # BLD AUTO: 6.2 X10E3/UL (ref 1.4–7)
NEUTROPHILS NFR BLD AUTO: 73 %
NITRITE UR QL STRIP: NEGATIVE
PH UR STRIP: 6 [PH] (ref 5–7.5)
PLATELET # BLD AUTO: 304 X10E3/UL (ref 150–379)
POTASSIUM SERPL-SCNC: 4.7 MMOL/L (ref 3.5–5.2)
PROT SERPL-MCNC: 6.9 G/DL (ref 6–8.5)
PROT UR QL STRIP: NEGATIVE
RBC # BLD AUTO: 4.53 X10E6/UL (ref 4.14–5.8)
SODIUM SERPL-SCNC: 140 MMOL/L (ref 134–144)
SP GR UR: 1.02 (ref 1–1.03)
TRIGL SERPL-MCNC: 235 MG/DL (ref 0–149)
TSH SERPL DL<=0.005 MIU/L-ACNC: 0.57 UIU/ML (ref 0.45–4.5)
UROBILINOGEN UR STRIP-MCNC: 0.2 MG/DL (ref 0.2–1)
VLDLC SERPL CALC-MCNC: 47 MG/DL (ref 5–40)
WBC # BLD AUTO: 8.5 X10E3/UL (ref 3.4–10.8)

## 2017-09-06 NOTE — PROGRESS NOTES
Low Vit. D. Take OTC supp 2594-2283 units daily. High TG/low HDL. Rec take Krill oil supp. Rest labs O.K.  Mild minc BS. Check A1c next OV.

## 2017-09-11 ENCOUNTER — OFFICE VISIT (OUTPATIENT)
Dept: NEUROLOGY | Age: 42
End: 2017-09-11

## 2017-09-11 DIAGNOSIS — F32.A ANXIETY AND DEPRESSION: Primary | ICD-10-CM

## 2017-09-11 DIAGNOSIS — R41.840 INATTENTION: ICD-10-CM

## 2017-09-11 DIAGNOSIS — F41.9 ANXIETY AND DEPRESSION: Primary | ICD-10-CM

## 2017-09-11 DIAGNOSIS — Z86.59 HISTORY OF ADHD: ICD-10-CM

## 2017-09-11 NOTE — PROGRESS NOTES
[address] [facility],[address]  [address]   [place] [address] [facility] [address] [place] [address] [address]   [phone] Office   [phone] Fax      Neuropsychology    Initial Diagnostic Interview Note      Referral:  Aleyda Boles MD    Ja Lorenzana is a 42 y.o. right handed   male who was unaccompanied to the initial clinical interview on 17. Please refer to his medical records for details pertaining to his history. Briefly, the patient reported that he completed high school and some college course. He was diagnosed with ADHD in elementary school and was put on Ritalin which his mother said made him exhausted and took him off and has not tried on anything else since. 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 He is easily distracted. Starts tasks and does not complete. Cannot focus even when he's trying. His type of work actually fits well with the type of attention issues he is having. No background meningitis/encephalitis, SINGH Fever, Lupus, Lyme, CVA, TBI, sz, etc.  He has had some orthopaedic issues and right knee problems at present. There is a strong family of ADHD. He gets easily anxious. Can't say he's ever been depressed but gets said about life situations from time to time. No counseling or psychiatrist.  Father just passed away on May 9th and really misses him a lot. Doesn't seem to cry at funerals, grandparent's , when his father passed. Wanted to cry then but couldn't. However, might have tears when a funny thing happens on YouTube. Laughs at a inappropriate things. Son with mild autism. Sleep is not good, and this is a chronic issue. Appetite is okay. When he was in school, he had an IEP for ADHD issues. He was in special education classes for LD and ED all throughout school including high school.   Not been on any meds for attention since Ritalin and no meds for mood current or previous. Family history of mood problems as well (Depression, anxiety, autism). No current legal stressors. He is forgetting conversations and has to take pencil and paper everywhere. Independent for driving and ADLs and no issues there. No previous neuropsych. Neuropsychological Mental Status Exam (NMSE):  Historian: Good  Praxis: No UE apraxia  R/L Orientation: Intact to self and to other  Dress: within normal limits   Weight: within normal limits   Appearance/Hygiene: within normal limits   Gait: within normal limits   Assistive Devices: Glasses  Mood: within normal limits   Affect: within normal limits   Comprehension: within normal limits   Thought Process: within normal limits   Expressive Language: within normal limits   Receptive Language: within normal limits   Motor:  No cognitive perseveration. Constantly moving legs. ETOH: occasionally, and not to excess  Tobacco: Denied  Illicit: Denied  SI/HI: Denied  Psychosis: Denied  Insight: Within normal limits  Judgment: Within normal limits  Other Psych:      Past Medical History:   Diagnosis Date    Advance directive discussed with patient 10/22/2015    IBS (irritable bowel syndrome)     Knee pain, acute 10/02/14     prepatellar bursitis [place] 10/09/14    Ruptured tendon 2/2014    left distal biceps tendon (WC injury)       Past Surgical History:   Procedure Laterality Date    EXTRACTION ERUPTED TOOTH/EXR  08/2017    Having teeth pulled and will have more dental work.     HX ORTHOPAEDIC Left 1979    foot injury       No Known Allergies    Family History   Problem Relation Age of Onset    Cancer Maternal Grandmother        Social History   Substance Use Topics    Smoking status: Never Smoker    Smokeless tobacco: Never Used    Alcohol use Yes      Comment: ; weekends       Current Outpatient Prescriptions   Medication Sig Dispense Refill    naproxen sodium (ALEVE) 220 mg cap Take 440 mg by mouth as needed.  lisinopril-hydroCHLOROthiazide (PRINZIDE, ZESTORETIC) 20-12.5 mg per tablet TAKE 1 TABLET BY MOUTH TWICE DAILY 60 Tab 1    allopurinol (ZYLOPRIM) 100 mg tablet TAKE 1 TABLET BY MOUTH DAILY 30 Tab 1         Plan:  Obtain authorization for testing from insurance company. Report to follow once testing, scoring, and interpretation completed. ? Organic based neurocognitive issues versus mood disorder or combination of same. ? Problems organic, functional, or both? This note will not be viewable in [address] 42year old male presents with aDHD Symptoms and was briefly treated for same when a child. LD and ED classes throughout school. Cognitive deficits impacting functioning and needs evaluation prior to treatment being attempted. Compounding the issue is anxiety and recent losses, can't cry at funerals and laughs at inappropriate times. Will evaluate for ADHD (organic versus functional), OCD type symptoms (everything has to be in twos,  by colors, even piles), anxiety (generalized), depression, or combination of same.

## 2017-09-13 ENCOUNTER — HOSPITAL ENCOUNTER (EMERGENCY)
Age: 42
Discharge: HOME | End: 2017-09-13
Attending: EMERGENCY MEDICINE
Payer: COMMERCIAL

## 2017-09-13 ENCOUNTER — APPOINTMENT (OUTPATIENT)
Dept: GENERAL RADIOLOGY | Age: 42
End: 2017-09-13
Attending: EMERGENCY MEDICINE
Payer: COMMERCIAL

## 2017-09-13 VITALS
OXYGEN SATURATION: 97 % | SYSTOLIC BLOOD PRESSURE: 134 MMHG | HEIGHT: 74 IN | RESPIRATION RATE: 16 BRPM | TEMPERATURE: 98 F | HEART RATE: 84 BPM | BODY MASS INDEX: 34.4 KG/M2 | WEIGHT: 268.08 LBS | DIASTOLIC BLOOD PRESSURE: 82 MMHG

## 2017-09-13 DIAGNOSIS — M25.561 ACUTE PAIN OF RIGHT KNEE: ICD-10-CM

## 2017-09-13 DIAGNOSIS — M25.561 PAIN AND SWELLING OF KNEE, RIGHT: ICD-10-CM

## 2017-09-13 DIAGNOSIS — M25.461 PAIN AND SWELLING OF KNEE, RIGHT: ICD-10-CM

## 2017-09-13 DIAGNOSIS — M70.41 PREPATELLAR BURSITIS OF RIGHT KNEE: Primary | ICD-10-CM

## 2017-09-13 PROCEDURE — 73562 X-RAY EXAM OF KNEE 3: CPT

## 2017-09-13 PROCEDURE — 99282 EMERGENCY DEPT VISIT SF MDM: CPT

## 2017-09-13 PROCEDURE — L1830 KO IMMOB CANVAS LONG PRE OTS: HCPCS

## 2017-09-13 RX ORDER — MELOXICAM 15 MG/1
15 TABLET ORAL DAILY
Qty: 20 TAB | Refills: 0 | Status: SHIPPED | OUTPATIENT
Start: 2017-09-13 | End: 2017-12-20 | Stop reason: ALTCHOICE

## 2017-09-13 RX ORDER — HYDROCODONE BITARTRATE AND ACETAMINOPHEN 5; 325 MG/1; MG/1
1 TABLET ORAL
Qty: 10 TAB | Refills: 0 | Status: SHIPPED | OUTPATIENT
Start: 2017-09-13 | End: 2017-12-20 | Stop reason: ALTCHOICE

## 2017-09-13 NOTE — LETTER
[address] 
[facility] EMERGENCY DEPT 
[address] [place] [address] [address] [id number] [phone] Work/School Note Date: 9/13/2017 To Whom It May concern: 
 
Frank Rodriguez was seen and treated today in the emergency room by the following provider(s): 
Attending Provider: Valentin Lisa MD 
Physician Assistant: Yeny Villafana. Fausto, Bryan Ashraf. Frank Rodriguez may return to work on 09/16/2017. Sincerely, 
 
 
 
 
Yeny Villafana.  Fausto, Bryan Ashraf

## 2017-09-14 NOTE — DISCHARGE INSTRUCTIONS
Bursitis: Care Instructions  Your Care Instructions  A bursa is a small sac of fluid that helps the tissues around a joint slide over one another easily. Injury or overuse of a joint can cause pain, redness, and inflammation in the bursa (bursitis). Bursitis usually gets better if you avoid the activity that caused it. You can help prevent bursitis from coming back by doing stretching and strengthening exercises. You may also need to change the way you do some activities. Follow-up care is a key part of your treatment and safety. Be sure to make and go to all appointments, and call your doctor if you are having problems. Its also a good idea to know your test results and keep a list of the medicines you take. How can you care for yourself at home? · Put ice or a cold pack on the area for 10 to 20 minutes at a time. Try to do this every 1 to 2 hours for the next 3 days (when you are awake) or until the swelling goes down. Put a thin cloth between the ice and your skin. · After the 3 days of using ice, you may use heat on the area. You can use a hot water bottle; a warm, moist towel; or a heating pad set on low. You can also try alternating heat and ice. · Rest the area where you have pain. Stop any activities that cause pain. Switch to activities that do not stress the area. · Take pain medicines exactly as directed. ¨ If the doctor gave you a prescription medicine for pain, take it as prescribed. ¨ If you are not taking a prescription pain medicine, ask your doctor if you can take an over-the-counter medicine. ¨ Do not take two or more pain medicines at the same time unless the doctor told you to. Many pain medicines have acetaminophen, which is Tylenol. Too much acetaminophen (Tylenol) can be harmful. · To prevent stiffness, gently move the joint as much as you can without pain every day. As the pain gets better, keep doing range-of-motion exercises.  Ask your doctor for exercises that will make the muscles around the joint stronger. Do these as directed. · You can slowly return to the activity that caused the pain, but do it with less effort until you can do it without pain or swelling. Be sure to warm up before and stretch after you do the activity. When should you call for help? Call your doctor now or seek immediate medical care if:  · You get a fever and chills. · You have increased swelling or redness in a joint. · You cannot use a joint, or the pain in a joint gets worse. Watch closely for changes in your health, and be sure to contact your doctor if:  · You have pain for 2 weeks or longer despite home treatment. Where can you learn more? Go to http://yany-seble.info/. Enter B340 in the search box to learn more about \"Bursitis: Care Instructions. \"  Current as of: May 23, 2016  Content Version: 11.3  © 5740-9822 [employer]. Care instructions adapted under license by [employer] (which disclaims liability or warranty for this information). If you have questions about a medical condition or this instruction, always ask your healthcare professional. [place] [address] any warranty or liability for your use of this information. Joint Pain: Care Instructions  Your Care Instructions  Many people have small aches and pains from overuse or injury to muscles and joints. Joint injuries often happen during sports or recreation, work tasks, or projects around the home. An overuse injury can happen when you put too much stress on a joint or when you do an activity that stresses the joint over and over, such as using the computer or rowing a boat. You can take action at home to help your muscles and joints get better. You should feel better in 1 to 2 weeks, but it can take 3 months or more to heal completely. Follow-up care is a key part of your treatment and safety.  Be sure to make and go to all appointments, and call your doctor if you are having problems. It's also a good idea to know your test results and keep a list of the medicines you take. How can you care for yourself at home? · Do not put weight on the injured joint for at least a day or two. · For the first day or two after an injury, do not take hot showers or baths, and do not use hot packs. The heat could make swelling worse. · Put ice or a cold pack on the sore joint for 10 to 20 minutes at a time. Try to do this every 1 to 2 hours for the next 3 days (when you are awake) or until the swelling goes down. Put a thin cloth between the ice and your skin. · Wrap the injury in an elastic bandage. Do not wrap it too tightly because this can cause more swelling. · Prop up the sore joint on a pillow when you ice it or anytime you sit or lie down during the next 3 days. Try to keep it above the level of your heart. This will help reduce swelling. · Take an over-the-counter pain medicine, such as acetaminophen (Tylenol), ibuprofen (Advil, Motrin), or naproxen (Aleve). Read and follow all instructions on the label. · After 1 or 2 days of rest, begin moving the joint gently. While the joint is still healing, you can begin to exercise using activities that do not strain or hurt the painful joint. When should you call for help? Call your doctor now or seek immediate medical care if:  · You have signs of infection, such as:  ¨ Increased pain, swelling, warmth, and redness. ¨ Red streaks leading from the joint. ¨ A fever. Watch closely for changes in your health, and be sure to contact your doctor if:  · Your movement or symptoms are not getting better after 1 to 2 weeks of home treatment. Where can you learn more? Go to http://yany-seble.info/. Enter P205 in the search box to learn more about \"Joint Pain: Care Instructions. \"  Current as of: March 21, 2017  Content Version: 11.3  © 2042-7602 [employer].  Care instructions adapted under license by [employer] (which disclaims liability or warranty for this information). If you have questions about a medical condition or this instruction, always ask your healthcare professional. [address] any warranty or liability for your use of this information. Knee Pain or Injury: Care Instructions  Your Care Instructions    Injuries are a common cause of knee problems. Sudden (acute) injuries may be caused by a direct blow to the knee. They can also be caused by abnormal twisting, bending, or falling on the knee. Pain, bruising, or swelling may be severe, and may start within minutes of the injury. Overuse is another cause of knee pain. Other causes are climbing stairs, kneeling, and other activities that use the knee. Everyday wear and tear, especially as you get older, also can cause knee pain. Rest, along with home treatment, often relieves pain and allows your knee to heal. If you have a serious knee injury, you may need tests and treatment. Follow-up care is a key part of your treatment and safety. Be sure to make and go to all appointments, and call your doctor if you are having problems. It's also a good idea to know your test results and keep a list of the medicines you take. How can you care for yourself at home? · Be safe with medicines. Read and follow all instructions on the label. ¨ If the doctor gave you a prescription medicine for pain, take it as prescribed. ¨ If you are not taking a prescription pain medicine, ask your doctor if you can take an over-the-counter medicine. · Rest and protect your knee. Take a break from any activity that may cause pain. · Put ice or a cold pack on your knee for 10 to 20 minutes at a time. Put a thin cloth between the ice and your skin. · Prop up a sore knee on a pillow when you ice it or anytime you sit or lie down for the next 3 days. Try to keep it above the level of your heart. This will help reduce swelling.   · If your knee is not swollen, you can put moist heat, a heating pad, or a warm cloth on your knee. · If your doctor recommends an elastic bandage, sleeve, or other type of support for your knee, wear it as directed. · Follow your doctor's instructions about how much weight you can put on your leg. Use a cane, crutches, or a walker as instructed. · Follow your doctor's instructions about activity during your healing process. If you can do mild exercise, slowly increase your activity. · Reach and stay at a healthy weight. Extra weight can strain the joints, especially the knees and hips, and make the pain worse. Losing even a few pounds may help. When should you call for help? Call 911 anytime you think you may need emergency care. For example, call if:  · You have symptoms of a blood clot in your lung (called a pulmonary embolism). These may include:  ¨ Sudden chest pain. ¨ Trouble breathing. ¨ Coughing up blood. Call your doctor now or seek immediate medical care if:  · You have severe or increasing pain. · Your leg or foot turns cold or changes color. · You cannot stand or put weight on your knee. · Your knee looks twisted or bent out of shape. · You cannot move your knee. · You have signs of infection, such as:  ¨ Increased pain, swelling, warmth, or redness. ¨ Red streaks leading from the knee. ¨ Pus draining from a place on your knee. ¨ A fever. · You have signs of a blood clot in your leg (called a deep vein thrombosis), such as:  ¨ Pain in your calf, back of the knee, thigh, or groin. ¨ Redness and swelling in your leg or groin. Watch closely for changes in your health, and be sure to contact your doctor if:  · You have tingling, weakness, or numbness in your knee. · You have any new symptoms, such as swelling. · You have bruises from a knee injury that last longer than 2 weeks. · You do not get better as expected. Where can you learn more?   Go to http://yany-seble.info/. Enter K195 in the search box to learn more about \"Knee Pain or Injury: Care Instructions. \"  Current as of: March 20, 2017  Content Version: 11.3  © 2188-5016 [employer]. Care instructions adapted under license by [employer] (which disclaims liability or warranty for this information). If you have questions about a medical condition or this instruction, always ask your healthcare professional. [address] any warranty or liability for your use of this information.

## 2017-09-14 NOTE — ED PROVIDER NOTES
HPI Comments: Alfonso Corbin is a 42 y.o. male with PMhx significant for IBS, knee pain who presents ambulatory to the ED with cc of gradually worsening right leg pain x 2.5 weeks. He reports associated knee pain. Pt states that his symptoms are exacerbated with bending his knee and palpation of his knee. Pt reports that he cannot lift his leg without experiencing pain. He reports hx of cortisone injection in his right knee but states that he is unaware of any injuries. He reports having a normal gait since the onset of his symptoms with intermittent episodes of knee buckling. Pt reports that he has an appointment with orthopedics on 09/20/2017 with Dr. Jorden Márquez. He denies any numbness, weakness, joint swelling, fevers, or chills. Social history significant for: - Tobacco, + EtOH, - Illicit drug use    PCP: Joseph Hernandez MD    There are no other complaints, changes or physical findings at this time. Written by HIEN Person, as dictated by [employer], [place]. The history is provided by the patient. No  was used. Past Medical History:   Diagnosis Date    Advance directive discussed with patient 10/22/2015    IBS (irritable bowel syndrome)     Knee pain, acute 10/02/14     prepatellar bursitis Aiden Watson 10/09/14    Ruptured tendon 2/2014    left distal biceps tendon (WC injury)       Past Surgical History:   Procedure Laterality Date    EXTRACTION ERUPTED TOOTH/EXR  08/2017    Having teeth pulled and will have more dental work.  HX ORTHOPAEDIC Left 1979    foot injury         Family History:   Problem Relation Age of Onset    Cancer Maternal Grandmother        Social History     Social History    Marital status:      Spouse name: N/A    Number of children: N/A    Years of education: N/A     Occupational History    Not on file.      Social History Main Topics    Smoking status: Never Smoker    Smokeless tobacco: Never Used    Alcohol use Yes      Comment: ; weekends    Drug use: No    Sexual activity: Not on file     Other Topics Concern    Not on file     Social History Narrative         ALLERGIES: Review of patient's allergies indicates no known allergies. Review of Systems   Constitutional: Negative. Negative for activity change, appetite change, chills, diaphoresis, fever and unexpected weight change. HENT: Negative for congestion, hearing loss, rhinorrhea, sinus pressure, sneezing, sore throat and trouble swallowing. Eyes: Negative for pain, redness, itching and visual disturbance. Respiratory: Negative for cough, shortness of breath and wheezing. Cardiovascular: Negative for chest pain, palpitations and leg swelling. Gastrointestinal: Negative for abdominal pain, constipation, diarrhea, nausea and vomiting. Genitourinary: Negative for dysuria. Musculoskeletal: Positive for arthralgias (right knee) and myalgias (right leg). Negative for gait problem and joint swelling. Skin: Negative for color change, pallor, rash and wound. Neurological: Negative for tremors, weakness, light-headedness, numbness and headaches. All other systems reviewed and are negative. Patient Vitals for the past 12 hrs:   Temp Pulse Resp BP SpO2   09/13/17 1812 98 °F (36.7 °C) 84 16 134/82 97 %       Physical Exam   Constitutional: He is oriented to person, place, and time. Vital signs are normal. He appears well-developed. No distress. 42 y.o.  male in NAD  Communicates appropriately and in full sentences  Elevated BMI. Ambulates without assistance   HENT:   Head: Normocephalic and atraumatic. Right Ear: External ear normal.   Left Ear: External ear normal.   Mouth/Throat: Oropharynx is clear and moist.   Eyes: Conjunctivae are normal. Pupils are equal, round, and reactive to light. Neck: Normal range of motion. Neck supple. Cardiovascular: Intact distal pulses.     Strong distal pulses   Pulmonary/Chest: Effort normal. No respiratory distress. Musculoskeletal: Normal range of motion. He exhibits no edema, tenderness or deformity. No neurologic, motor, vascular, or compartment embarrassment observed on exam. No focal neurologic deficits. No joint laxity. NVI. No medial or lateral joint tenderness   Neurological: He is alert and oriented to person, place, and time. No cranial nerve deficit. Coordination normal.   No focal neuro deficits. NVI. Neurologically intact of UE and LE B/L  Sensation intact and symmetrical of UE and LE B/L. Strength 5/5 of UE B/L, Strength 5/5 of LE B/L. Symmetric bulk and tone of LE muscle groups. Skin: Skin is warm and dry. No rash noted. He is not diaphoretic. No erythema. No pallor. No acute overlying skin changes including erythema or abrasions   Psychiatric: He has a normal mood and affect. Nursing note and vitals reviewed. MDM  Number of Diagnoses or Management Options  Acute pain of right knee:   Pain and swelling of knee, right:   Prepatellar bursitis of right knee:   Diagnosis management comments:   DDx: fracture, contusion, effusion, sprain, strain, bursitis; cannot exclude internal derangement. Doubt gout, OA, Baker's cyst, septic arthritis, due to patient's presentation. Amount and/or Complexity of Data Reviewed  Tests in the radiology section of CPT®: ordered and reviewed  Review and summarize past medical records: yes    Patient Progress  Patient progress: stable    Procedures    I reviewed our electronic medical record system for any past medical records that were available that may contribute to the patients current condition, the nursing notes and vital signs from today's visit     Nursing notes will be reviewed as they become available in realtime while the pt is in the ED. Progress Note:  8:05 PM  The patients presenting problems have been discussed, and they are in agreement with the care plan formulated and outlined with them.  I have encouraged them to ask questions as they arise throughout their visit. Will continue to monitor. PROGRESS NOTE:  8:09 PM  Pain medication was offered. Pt declined. Written by HIEN Collins, as dictated by Nannette Singh PA-C.     DISCHARGE NOTE:  8:19 PM  Gladys Dawson's  results have been reviewed with him. He has been counseled regarding his diagnosis. He verbally conveys understanding and agreement of the signs, symptoms, diagnosis, treatment and prognosis and additionally agrees to follow up as recommended with Dr. Morena Fleming MD in 24 - 48 hours. He also agrees with the care-plan and conveys that all of his questions have been answered. I have also put together some discharge instructions for him that include: 1) educational information regarding their diagnosis, 2) how to care for their diagnosis at home, as well a 3) list of reasons why they would want to return to the ED prior to their follow-up appointment, should their condition change. He and/or family's questions have been answered. I have encouraged them to see the official results in [facility]\" or to retrieve the specifics of their results from medical records. IMAGING COMPLETED AND REVIEWED:  XR KNEE RT 3 V   Final Result   XAM:  XR KNEE RT 3 V     INDICATION:   Right knee pain for 2 weeks. No injury. Exacerbated by flexion.     COMPARISON: Right knee views on 9/20/2014.     FINDINGS: Three views of the right knee demonstrate increased prepatellar soft  tissue swelling. There is no change in chronic distal patellar ossific  tendinosis. No acute fracture or dislocation. Mild patellofemoral joint  osteoarthritis is unchanged. No erosion or chondrocalcinosis.     IMPRESSION  IMPRESSION:    1. Increased nonspecific prepatellar soft tissue swelling. In the absence of  injury, prepatellar bursitis is most likely. 2. Unchanged chronic distal patellar ossific tendinosis. 3. No fracture. CLINICAL IMPRESSION:  1.  Prepatellar bursitis of right knee    2. Acute pain of right knee    3. Pain and swelling of knee, right        Plan:  1. Return precautions  2. Medications as prescribed  3. Follow-ups as discussed  Current Discharge Medication List      START taking these medications    Details   HYDROcodone-acetaminophen (NORCO) 5-325 mg per tablet Take 1 Tab by mouth every four (4) hours as needed for Pain. Max Daily Amount: 6 Tabs. Qty: 10 Tab, Refills: 0      meloxicam (MOBIC) 15 mg tablet Take 1 Tab by mouth daily. Qty: 20 Tab, Refills: 0           Follow-up Information     Follow up With Details Comments [address], [place] Schedule an appointment as soon as possible for a visit in 2 days As needed, If symptoms worsen, Possible further evaluation and treatment [address]  [address]  [phone]      \[facility]\"" EMERGENCY DEPT Go to As needed, If symptoms worsen [address] Reji Vazquez [place] Schedule an appointment as soon as possible for a visit in 2 days As needed, If symptoms worsen, Possible further evaluation and treatment [address]  [phone]          Return to the closest emergency room or follow up sooner for any deterioration      This note will not be viewable in [address]

## 2017-09-14 NOTE — ED NOTES
Applied knee immobilizer to right knee, instructed pt on how to use. Pt verbalized understanding and demonstrated how to use.

## 2017-09-14 NOTE — ED NOTES
Pt reports to the ED for c/c of right knee pain ongoing for days. Pt reports pain with bending knee. Pt reports an upcoming appt in one week with Ortho.

## 2017-09-14 NOTE — ED NOTES
SANDY Grant has reviewed discharge instructions with the patient. The patient verbalized understanding. Pt discharged with written instructions. No further concerns at this time. Pt ambulatory to exit with steady gait.

## 2017-09-18 DIAGNOSIS — I10 ESSENTIAL HYPERTENSION: ICD-10-CM

## 2017-09-18 RX ORDER — LISINOPRIL AND HYDROCHLOROTHIAZIDE 12.5; 2 MG/1; MG/1
TABLET ORAL
Qty: 180 TAB | Refills: 3 | Status: SHIPPED | OUTPATIENT
Start: 2017-09-18 | End: 2018-01-18 | Stop reason: SDUPTHER

## 2017-09-18 RX ORDER — ALLOPURINOL 100 MG/1
TABLET ORAL
Qty: 30 TAB | Refills: 1 | Status: SHIPPED | OUTPATIENT
Start: 2017-09-18 | End: 2017-12-20 | Stop reason: SDUPTHER

## 2017-09-18 NOTE — TELEPHONE ENCOUNTER
He was just in the office the beginning of the month for his CHP and lab-uric acid wasn't checked  Blood pressure was 133/87

## 2017-09-29 ENCOUNTER — OFFICE VISIT (OUTPATIENT)
Dept: NEUROLOGY | Age: 42
End: 2017-09-29

## 2017-09-29 DIAGNOSIS — F90.0 ATTENTION DEFICIT HYPERACTIVITY DISORDER (ADHD), INATTENTIVE TYPE, MODERATE: ICD-10-CM

## 2017-09-29 DIAGNOSIS — F33.1 DEPRESSION, MAJOR, RECURRENT, MODERATE (HCC): Primary | ICD-10-CM

## 2017-09-29 DIAGNOSIS — F41.1 GENERALIZED ANXIETY DISORDER: ICD-10-CM

## 2017-10-04 NOTE — PROGRESS NOTES
[facility],[address]  [address]   [place]  [address] [facility] F[place]   [phone] Office   [phone] Fax      Psychological Evaluation Report    Referral:  Davis Mccabe MD    Yane Alvarez is a 42 y.o. right handed   male who was unaccompanied to the initial clinical interview on 17. Please refer to his medical records for details pertaining to his history. Briefly, the patient reported that he completed high school and some college course. He was diagnosed with ADHD in elementary school and was put on Ritalin which his mother said made him exhausted and took him off and has not tried on anything else since. 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 He is easily distracted. Starts tasks and does not complete. Cannot focus even when he's trying. His type of work actually fits well with the type of attention issues he is having. No background meningitis/encephalitis, SINGH Fever, Lupus, Lyme, CVA, TBI, sz, etc.  He has had some orthopaedic issues and right knee problems at present. There is a strong family of ADHD. He gets easily anxious. Can't say he's ever been depressed but gets said about life situations from time to time. No counseling or psychiatrist.  Father just passed away on May 9th and really misses him a lot. Doesn't seem to cry at funerals, grandparent's , when his father passed. Wanted to cry then but couldn't. However, might have tears when a funny thing happens on YouTube. Laughs at a inappropriate things. Son with mild autism. Sleep is not good, and this is a chronic issue. Appetite is okay. When he was in school, he had an IEP for ADHD issues. He was in special education classes for LD and ED all throughout school including high school.   Not been on any meds for attention since Ritalin and no meds for mood current or previous. Family history of mood problems as well (Depression, anxiety, autism). No current legal stressors. He is forgetting conversations and has to take pencil and paper everywhere. Independent for driving and ADLs and no issues there. No previous neuropsych. Neuropsychological Mental Status Exam (NMSE):  Historian: Good  Praxis: No UE apraxia  R/L Orientation: Intact to self and to other  Dress: within normal limits   Weight: within normal limits   Appearance/Hygiene: within normal limits   Gait: within normal limits   Assistive Devices: Glasses  Mood: within normal limits   Affect: within normal limits   Comprehension: within normal limits   Thought Process: within normal limits   Expressive Language: within normal limits   Receptive Language: within normal limits   Motor:  No cognitive perseveration. Constantly moving legs. ETOH: occasionally, and not to excess  Tobacco: Denied  Illicit: Denied  SI/HI: Denied  Psychosis: Denied  Insight: Within normal limits  Judgment: Within normal limits  Other Psych:      Past Medical History:   Diagnosis Date    Advance directive discussed with patient 10/22/2015    IBS (irritable bowel syndrome)     Knee pain, acute 10/02/14     prepatellar bursitis Adron Lan 10/09/14    Ruptured tendon 2/2014    left distal biceps tendon (WC injury)       Past Surgical History:   Procedure Laterality Date    EXTRACTION ERUPTED TOOTH/EXR  08/2017    Having teeth pulled and will have more dental work.     HX ORTHOPAEDIC Left 1979    foot injury       No Known Allergies    Family History   Problem Relation Age of Onset    Cancer Maternal Grandmother        Social History   Substance Use Topics    Smoking status: Never Smoker    Smokeless tobacco: Never Used    Alcohol use Yes      Comment: ; weekends       Current Outpatient Prescriptions   Medication Sig Dispense Refill    allopurinol (ZYLOPRIM) 100 mg tablet TAKE 1 TABLET BY MOUTH DAILY 30 Tab 1    lisinopril-hydroCHLOROthiazide (PRINZIDE, ZESTORETIC) 20-12.5 mg per tablet TAKE 1 TABLET BY MOUTH TWICE DAILY 180 Tab 3    HYDROcodone-acetaminophen (NORCO) 5-325 mg per tablet Take 1 Tab by mouth every four (4) hours as needed for Pain. Max Daily Amount: 6 Tabs. 10 Tab 0    meloxicam (MOBIC) 15 mg tablet Take 1 Tab by mouth daily. 20 Tab 0         Plan:  Obtain authorization for testing from [place] [facility]. Report to follow once testing, scoring, and interpretation completed. ? Organic based neurocognitive issues versus mood disorder or combination of same. ? Problems organic, functional, or both? This note will not be viewable in [address] 42year old male presents with aDHD Symptoms and was briefly treated for same when a child. LD and ED classes throughout school. Cognitive deficits impacting functioning and needs evaluation prior to treatment being attempted. Compounding the issue is anxiety and recent losses, can't cry at funerals and laughs at inappropriate times. Will evaluate for ADHD (organic versus functional), OCD type symptoms (everything has to be in twos,  by colors, even piles), anxiety (generalized), depression, or combination of same. Psychological Evaluation  Patient Testing 9/29/17 Report Completed 10/4/17  A Psychometrist Assisted w/ portions of this evaluation while under my direct  supervision    Neuropsychologist Administered, Interpreted, & Reported: Neuropsychological Mental Status Exam, Revised Memory & Behavior Checklist, MMSE, Clock Drawing, Test Of Premorbid Functioning, Serena Ng Adult ADD Scales, Rojelio-Melzack Pain Questionnaire, History Taking  & Clinical Interview With The Patient*, Review Of Available Records*.     Psychometrist Administered & Neuropsychologist Interpreted & Neuropsychologist Reported: Speech-Sounds Perception Test,[place] Rhythm Test, Paced Serial Addition Test, Wechsler Adult Intelligence Scale - IV, Verbal Fluency Tests, Giorgio & Giorgio - Revised, Trailmaking Test Parts A & B, Buschke Selective Reminding Test, Carlos Complex Figure Test, Randall Depression Inventory - II, Randall Anxiety Inventory, Personality Assessment Inventory,    Computer Administered & Neuropsychologist Interpreted & Neuropsychologist Reported: Francis Continuous Performance Test - III      Test Findings:  Note:  The patients raw data have been compared with currently available norms which include demographic corrections for age, gender, and/or education. Sometimes, the patients scores are compared to demographically similar individuals as close to the patients age, education level, etc., as possible. \"Average\" is viewed as being +/- 1 standard deviation (SD) from the stated mean for a particular test score. \"Low average\" is viewed as being between 1 and 2 SD below the mean, and above average is viewed as being 1 and 2 SD above the mean. Scores falling in the borderline range (between 1-1/2 and 2 SD below the mean) are viewed with particular attention as to whether they are normal or abnormal neurocognitive test scores. Other methods of inference in analyzing the test data are also utilized, including the pattern and range of scores in the profile, bilateral motor functions, and the presence, if any, of pathognomonic signs. Behaviorally, the patient was friendly and cooperative and appeared motivated to perform well during this examination. Within this context, the results of this evaluation are viewed as a valid reflection of the patients actual neurocognitive and emotional status. The patient's self-reported score of 52 on the [facility] Adult ADD Scales was within the elevated risk range for ADHD related concerns. His structured word list fluency, as assessed by the FAS Test, was within the average range with a T score of 52. Category fluency was within the above average range with a T Score of 55.   Confrontation naming, as assessed by the [facility] - Revised, was within the average range at 57/60 correct (T = 50). This pattern of performance is not indicative of a patient who is at increased risk for day-to-day problems with verbal fluency and/or confrontation naming. The patient was administered the [facility] Continuous Performance Test-III and review of the subscales within this instrument revealed moderate concern for inattentiveness without concern for impulsivity. Verbal auditory attention (T = 47) was average. Nonverbal  auditory attention and discrimination (T = 37) was within the mildly impaired range. High level auditory information processing speed, as assessed by the PASAT, was within the normal range for Trial 2 (- 1.26 SD). This pattern of performance is indicative of a patient who is at increased risk for day-to-day problems with sustained visual attention. Auditory attention and discrimination related abilities and high level auditory information processing speed related abilities were normal.  .       The patient was administered the Wechsler Adult Intelligence Scale - IV. See Appendix I (in media section of this EMR) for full breakdown of IQ scores. There was no clinically significant difference between his low average range Working Memory Index score of 89 (23rd %ile) and his average range Processing Speed Index score of 92 (30th %ile). His Verbal Comprehension Index score of 102 (55th  %ile) was within the normal range. His Perceptual Reasoning Index score of 102 (55th %Ile) was also within the normal range. This pattern of performance is not indicative of a patient who is at increased risk for day-to-day problems with working memory and/or processing speed. Verbal comprehension and perceptual reasoning abilities are normal. Working memory is somewhat lower than expected based on his performance on a task assessing premorbid functioning levels.        The patient was administered the Booktropechke Selective Reminding Test and his basic learning and memory (109/144) was normal.  In this regard, his consistency related long term retrieval was impaired (77/144 correct), especially when compared to his normal range Long Term Storage (110/144). His discrepancy score  (33 points) is clinically significant and is suggestive of a high level cognitive organization problem and/or high level attention concern. Visual organization and memory abilities were normal on the Carlos Complex Figure Test.       Simple timed visual motor sequencing (Trailmaking Test Part A) was within the mildly to moderately impaired range with a T score of 31. The patient's performance on a similar, but more complex task of timed visual motor sequencing (Trailmaking Test Part B) was within the mildly impaired range with a T score of 39. The patient made zero sequencing errors on this latter test.  Taken together, this pattern of performance is not indicative of a patient who is at increased risk for day-to-day problems with frontal lobe-mediated executive functioning abilities. The patient rated his current level of pain as \"2/5  - Discomforting\" on the Rojelio-Melzack Pain Questionnaire. He reported pain in his right knee. His Randall Depression Inventory - II score of 21 was within the moderately depressed range. His Randall Anxiety Inventory score of 17 reflected moderate anxiety. The patient was also administered the Personality Assessment Inventory and generated a valid profile for interpretation. Within this context, he can be socially isolated at times. His self-concept is quite harsh and negative. He is inwardly more troubled by self-doubt and misgivings about his adequacy than is readily apparent to others. He is uncomfortable in social situations. He will socially isolate himself to avoid the stress associated with social interaction(s). Day-to-day stress is reported . He is motivated for treatment.   No evidence of OCD. Impressions and Recommendations:  From the actual neurocognitive profile, there is support for a \"high functioning\" ADHD- Inattentive Type problem. He is also showing problems with high level cognitive organization abilities. His performance across all other neurocognitive domains assessed were normal.  From an emotional standpoint, there is moderate depression and anxiety. He is also showing signs of social isolation and withdrawal which is not to the point whereby I would call this avoidant personality or schizoid but there are some elements of that here. It is noted that he has a family history of mild autism and while the neurocognitive profile itself is not consistent with same I have not ruled it out. I do not see evidence of OCD. Instead, perseverative behaviors appear more reflective of generalized anxiety and I see no evidence of obsessions. Memory is normal and any day-to-day memory problems he observes are likely a function of ADHD and mood issues. I see the ADHD issue as organic and chronic. The mood concerns appear to be a function of both organic and functional factors. In addition to continued medical care, my recommendations include   consideration for a 30-day trial of an appropriate (non-stimulant) attention related medication. During this trial, the patient should keep track of his response to this medication and provide the prescribing physician with feedback at the end of the month regarding its efficacy. He may also benefit from organizational skills related training. I also recommend psychotherapy first to assist with mood issues and then consider psychiatric medication management if therapy by itself does not provide symptom relief. Baseline now established. Clinical correlation is, of course, indicated. I will discuss these findings with the patient when he follows up with me in the near future. Follow up prn.      DIAGNOSES: ADHD- Inattentive- Moderate    Anxiety - Moderate    Depression - Moderate         The above information is based upon information currently available to me. If there is any additional information of which I am currently unaware, I would be more than happy to review it upon having it made available to me. Thank you for the opportunity to see this interesting individual.     Sincerely,       Madiha Lopez. Shorty Irving, EdS,LCP    Appendix: IQ Test results (see media section of this EMR)    Cc: Yefri Andre MD         2 units -48659- 1.5 hours. Record review. Review of history provided by patient. Review of collaborative information. Testing by Clinician. Review of raw data. Scoring. Report writing of individual tests administered by Clinician. Integration of individual tests administered by psychometrist (that were previously reported and billed under psychometry code below) with testing by clinician and review of records/history/collaborative information. Case Conceptualization, Report writing. Coordination Of Care. 4 units  -56024 - 3.5 hours. Psychometrist test prep, administration, and scoring under clinician's direct supervision. Clinical interpretation of individual tests administered by psychometrist .  Clinician report of individual tests administered by psychometrist.    1 unit - 90115 - 40 minutes. Computer testing and scoring and clinician's interpretation of computer-administered test(s)    \"Unit\" is defined by CPT/National Guidelines (31 - 60 minutes). Integral services including scoring of raw data, data interpretation, case conceptualization, report writing etcetera were initiated after the patient finished testing/raw data collected and was completed on the date the report was signed.

## 2017-12-05 ENCOUNTER — OFFICE VISIT (OUTPATIENT)
Dept: NEUROLOGY | Age: 42
End: 2017-12-05

## 2017-12-05 DIAGNOSIS — F41.1 GENERALIZED ANXIETY DISORDER: ICD-10-CM

## 2017-12-05 DIAGNOSIS — F90.0 ATTENTION DEFICIT HYPERACTIVITY DISORDER (ADHD), INATTENTIVE TYPE, MODERATE: ICD-10-CM

## 2017-12-05 DIAGNOSIS — F33.1 DEPRESSION, MAJOR, RECURRENT, MODERATE (HCC): Primary | ICD-10-CM

## 2017-12-05 NOTE — PROGRESS NOTES
Office feedback session with the patient today. I reviewed the results of the recent Neuropsychological Evaluation, including discussing individual tests as well as patient's areas of neurocognitive strength versus weakness. Education was provided regarding my diagnostic impressions, and we discussed treatment plan/options. I also answered numerous questions related to the clinical findings, including discussing various methods to improve cognition and mood. Counseling provided regarding mood and cognition. We discussed the ADHD is separate from emotional distress. Needs treatment for both and overall pleased with test results. CBT and supportive psychotherapy techniques were utilized. The patient will follow up with the referring provider, and reported being very pleased with the services provided. Follow up with [facility] prn. 20 minutes with patient, record review, coordination of care. Records provided. We discussed:     From the actual neurocognitive profile, there is support for a \"high functioning\" ADHD- Inattentive Type problem. He is also showing problems with high level cognitive organization abilities. His performance across all other neurocognitive domains assessed were normal.  From an emotional standpoint, there is moderate depression and anxiety. He is also showing signs of social isolation and withdrawal which is not to the point whereby I would call this avoidant personality or schizoid but there are some elements of that here. It is noted that he has a family history of mild autism and while the neurocognitive profile itself is not consistent with same I have not ruled it out. I do not see evidence of OCD. Instead, perseverative behaviors appear more reflective of generalized anxiety and I see no evidence of obsessions.   Memory is normal and any day-to-day memory problems he observes are likely a function of ADHD and mood issues.                             I see the ADHD issue as organic and chronic. The mood concerns appear to be a function of both organic and functional factors. In addition to continued medical care, my recommendations include   consideration for a 30-day trial of an appropriate (non-stimulant) attention related medication. During this trial, the patient should keep track of his response to this medication and provide the prescribing physician with feedback at the end of the month regarding its efficacy. He may also benefit from organizational skills related training. I also recommend psychotherapy first to assist with mood issues and then consider psychiatric medication management if therapy by itself does not provide symptom relief. Baseline now established. Clinical correlation is, of course, indicated. I will discuss these findings with the patient when he follows up with me in the near future.   Follow up prn.      DIAGNOSES:            ADHD- Inattentive- Moderate                                              Anxiety - Moderate                                              Depression - Moderate

## 2017-12-20 ENCOUNTER — OFFICE VISIT (OUTPATIENT)
Dept: FAMILY MEDICINE CLINIC | Age: 42
End: 2017-12-20

## 2017-12-20 VITALS
WEIGHT: 283.6 LBS | OXYGEN SATURATION: 96 % | HEART RATE: 88 BPM | HEIGHT: 74 IN | BODY MASS INDEX: 36.4 KG/M2 | DIASTOLIC BLOOD PRESSURE: 94 MMHG | SYSTOLIC BLOOD PRESSURE: 136 MMHG | TEMPERATURE: 98.1 F | RESPIRATION RATE: 16 BRPM

## 2017-12-20 DIAGNOSIS — E79.0 HYPERURICEMIA: ICD-10-CM

## 2017-12-20 DIAGNOSIS — I10 ESSENTIAL HYPERTENSION: ICD-10-CM

## 2017-12-20 DIAGNOSIS — F90.9 ATTENTION DEFICIT HYPERACTIVITY DISORDER (ADHD), UNSPECIFIED ADHD TYPE: Primary | ICD-10-CM

## 2017-12-20 DIAGNOSIS — I10 ELEVATED BLOOD PRESSURE READING IN OFFICE WITH DIAGNOSIS OF HYPERTENSION: ICD-10-CM

## 2017-12-20 RX ORDER — ALLOPURINOL 100 MG/1
TABLET ORAL
Qty: 90 TAB | Refills: 0 | Status: SHIPPED | OUTPATIENT
Start: 2017-12-20 | End: 2018-01-18 | Stop reason: SDUPTHER

## 2017-12-20 RX ORDER — ATOMOXETINE 10 MG/1
10 CAPSULE ORAL DAILY
Qty: 30 CAP | Refills: 0 | Status: SHIPPED | OUTPATIENT
Start: 2017-12-20 | End: 2018-06-01 | Stop reason: SDUPTHER

## 2017-12-20 NOTE — PROGRESS NOTES
Having pbs with ins and pharm benefits. No MH number on ins card. Reviewed neuro notes recommending non stim med for ADHD, psycotx for mood dis. Visit Vitals    BP (!) 136/94 (BP 1 Location: Left arm, BP Patient Position: Sitting)    Pulse 88    Temp 98.1 °F (36.7 °C) (Oral)    Resp 16    Ht 6' 2\" (1.88 m)    Wt 283 lb 9.6 oz (128.6 kg)    SpO2 96%    BMI 36.41 kg/m2       Patient alert and cooperative. Reviewed above. Assessment:  1. ADHD, for testing. 2. New issues with anxiety, possible depression. Plan:  1. Begin Strattera 10 mg dose, given one month rx. 2. Refilled Allopurinol for hyperuricemia for three months. 3. Patient to check with insurance about mental health benefits, will refer for psychotherapy pending meds. 4. Follow up with testing and recheck here otherwise prn.

## 2017-12-20 NOTE — MR AVS SNAPSHOT
Visit Information Date & Time Provider Department Dept. Phone Encounter #  
 12/20/2017  3:40 PM Stacy Manzo MD [facility] [phone] [id number] Follow-up Instructions Return in about 3 months (around 3/20/2018) for lab appt. Upcoming Health Maintenance Date Due DTaP/Tdap/Td series (2 - Td) 7/30/2027 Allergies as of 12/20/2017  Review Complete On: 12/20/2017 By: Yamilka Oscar RN No Known Allergies Current Immunizations  Reviewed on 9/5/2017 Name Date Influenza Vaccine 10/17/2015 Influenza Vaccine (Quad) PF 9/5/2017, 10/20/2014 Tdap 7/30/2017  1:30 AM, 8/6/2015 Not reviewed this visit You Were Diagnosed With   
  
 Codes Comments Attention deficit hyperactivity disorder (ADHD), unspecified ADHD type    -  Primary ICD-10-CM: F90.9 ICD-9-CM: 314.01 Hyperuricemia     ICD-10-CM: E79.0 ICD-9-CM: 790.6 Essential hypertension     ICD-10-CM: I10 
ICD-9-CM: 401.9 Elevated blood pressure reading in office with diagnosis of hypertension     ICD-10-CM: I10 
ICD-9-CM: 401.9 Vitals BP Pulse Temp Resp Height(growth percentile) Weight(growth percentile) (!) 136/94 (BP 1 Location: Left arm, BP Patient Position: Sitting) 88 98.1 °F (36.7 °C) (Oral) 16 6' 2\" (1.88 m) 283 lb 9.6 oz (128.6 kg) SpO2 BMI Smoking Status 96% 36.41 kg/m2 Never Smoker Vitals History BMI and BSA Data Body Mass Index Body Surface Area  
 36.41 kg/m 2 2.59 m 2 Preferred Pharmacy Pharmacy Name Phone [facility] [address] [facility], [facility] [address] [phone] Your Updated Medication List  
  
   
This list is accurate as of: 12/20/17  4:46 PM.  Always use your most recent med list.  
  
  
  
  
 allopurinol 100 mg tablet Commonly known as:  ZYLOPRIM  
TAKE 1 TABLET BY MOUTH DAILY  
  
 atomoxetine 10 mg capsule Commonly known as:  STRATTERA Take 1 Cap by mouth daily. lisinopril-hydroCHLOROthiazide 20-12.5 mg per tablet Commonly known as:  PRINZIDE, ZESTORETIC  
TAKE 1 TABLET BY MOUTH TWICE DAILY Prescriptions Sent to Pharmacy Refills  
 atomoxetine (STRATTERA) 10 mg capsule 0 Sig: Take 1 Cap by mouth daily. Class: Normal  
 Pharmacy: [facility] [address], [facility] Ph #: [phone] Route: Oral  
 allopurinol (ZYLOPRIM) 100 mg tablet 0 Sig: TAKE 1 TABLET BY MOUTH DAILY Class: Normal  
 Pharmacy: [facility] [address], [facility] Ph #: [phone] Follow-up Instructions Return in about 3 months (around 3/20/2018) for lab appt. Introducing [facility] SERVICES! Dear Marion Alexis: 
Thank you for requesting a Medic[employer] account. Our records indicate that you already have an active Medicast account. You can access your account anytime at [email or web address] Did you know that you can access your hospital and ER discharge instructions at any time in Medicast? You can also review all of your test results from your hospital stay or ER visit. Additional Information If you have questions, please visit the Frequently Asked Questions section of the Medicast website at [email or web address] Remember, Medicast is NOT to be used for urgent needs. For medical emergencies, dial 911. Now available from your iPhone and Android! Please provide this summary of care documentation to your next provider. Your primary care clinician is listed as [id number] If you have any questions after today's visit, please call [phone].

## 2017-12-20 NOTE — PROGRESS NOTES
Chief Complaint   Patient presents with    Medication Refill     Insurance changed. Having problems getting  with coverage. Needs to discuss meds the Neurologist wants him on.  1. Have you been to the ER, urgent care clinic since your last visit? Hospitalized since your last visit? Yes When: 9/13/17 Where: ER Reason for visit: Leg pain    2. Have you seen or consulted any other health care providers outside of the [address] [place] [address] since your last visit? Include any pap smears or colon screening. No     I have reviewed Health Maintenance with the patient and updated. Advance Care Planning information reviewed and given to the patient given at a previous visit.

## 2018-01-18 DIAGNOSIS — E79.0 HYPERURICEMIA: ICD-10-CM

## 2018-01-18 DIAGNOSIS — I10 ESSENTIAL HYPERTENSION: ICD-10-CM

## 2018-01-18 NOTE — TELEPHONE ENCOUNTER
PCP: Norbert Renner MD    Last appt: 12/20/2017  Future Appointments  Date Time Provider Diogenes Marquezi   3/20/2018 8:00 AM LAB [facility] LO       Requested Prescriptions     Pending Prescriptions Disp Refills    lisinopril-hydroCHLOROthiazide (PRINZIDE, ZESTORETIC) 20-12.5 mg per tablet 60 Tab 2     Sig: TAKE 1 TABLET BY MOUTH TWICE DAILY    allopurinol (ZYLOPRIM) 100 mg tablet 30 Tab 2     Sig: TAKE 1 TABLET BY MOUTH DAILY     Lab Results   Component Value Date/Time    Sodium 140 09/05/2017 10:20 AM    Potassium 4.7 09/05/2017 10:20 AM    Chloride 100 09/05/2017 10:20 AM    CO2 24 09/05/2017 10:20 AM    Anion gap 10 09/20/2014 04:15 PM    Glucose 103 09/05/2017 10:20 AM    BUN 17 09/05/2017 10:20 AM    Creatinine 0.94 09/05/2017 10:20 AM    BUN/Creatinine ratio 18 09/05/2017 10:20 AM    GFR est  09/05/2017 10:20 AM    GFR est non- 09/05/2017 10:20 AM    Calcium 9.7 09/05/2017 10:20 AM     No results found for: HBA1C, HGBE8, BTH7EWRZ, KRA4WCZU  Lab Results   Component Value Date/Time    Cholesterol, total 173 09/05/2017 10:20 AM    HDL Cholesterol 24 09/05/2017 10:20 AM    LDL, calculated 102 09/05/2017 10:20 AM    VLDL, calculated 47 09/05/2017 10:20 AM    Triglyceride 235 09/05/2017 10:20 AM     Lab Results   Component Value Date/Time    TSH 0.567 09/05/2017 10:20 AM     Patient wants to get these meds at [facility] will be more affordable.

## 2018-01-20 RX ORDER — LISINOPRIL AND HYDROCHLOROTHIAZIDE 12.5; 2 MG/1; MG/1
TABLET ORAL
Qty: 60 TAB | Refills: 1 | Status: SHIPPED | OUTPATIENT
Start: 2018-01-20 | End: 2018-08-07 | Stop reason: SDUPTHER

## 2018-01-20 RX ORDER — ALLOPURINOL 100 MG/1
TABLET ORAL
Qty: 30 TAB | Refills: 1 | Status: SHIPPED | OUTPATIENT
Start: 2018-01-20 | End: 2018-04-19 | Stop reason: SDUPTHER

## 2018-03-15 ENCOUNTER — TELEPHONE (OUTPATIENT)
Dept: FAMILY MEDICINE CLINIC | Age: 43
End: 2018-03-15

## 2018-03-15 NOTE — TELEPHONE ENCOUNTER
There was 1 refill on his Allopurinol and Lisinopril/HCTZ called to new pharmacy. Other med requires an office visit. Spoke with patient and he never got the Strattera due to the cost.  He may be able to pay for it now with his tax return. He would like it sent to his new pharmacy at [phone].

## 2018-03-15 NOTE — TELEPHONE ENCOUNTER
----- Message from [facility] sent at 3/15/2018  8:54 AM EDT -----  Regarding: Dr. Rubin Agee / refill  Patient is requesting callback from Dr. Juan C Ureña nurse  To have refill \"ADHD, blood pressure and gout\" sent to [facility]  [phone].   Best contact [phone]

## 2018-03-19 ENCOUNTER — TELEPHONE (OUTPATIENT)
Dept: FAMILY MEDICINE CLINIC | Age: 43
End: 2018-03-19

## 2018-03-19 NOTE — TELEPHONE ENCOUNTER
----- Message from Yana Vanessa sent at 3/19/2018  5:08 PM EDT -----  Regarding: Reads/Refill  Pt called in from [address] stating he called practice for Rx\" Atomoxetine\"  but pharmacy has yet to receive medication for process     P:[phone]  F: [phone]     [phone]

## 2018-03-29 ENCOUNTER — TELEPHONE (OUTPATIENT)
Dept: FAMILY MEDICINE CLINIC | Age: 43
End: 2018-03-29

## 2018-03-29 NOTE — TELEPHONE ENCOUNTER
Patient returned my call. Patient wanted to know if [place] is to be taken at a certain time. Directions are 1 daily and he has taken it in the evening and is tired during the day. Told it is alright to try another time of day and see if that works better for him.

## 2018-03-29 NOTE — TELEPHONE ENCOUNTER
----- Message from Bala Ewing sent at 3/29/2018  2:33 PM EDT -----  Regarding: Dr Shanon Bailey call.   Please call at [phone]

## 2018-03-29 NOTE — TELEPHONE ENCOUNTER
----- Message from Ramu Erazo sent at 3/29/2018  9:27 AM EDT -----  Regarding: Dr. Lidya Smith is requesting a callback he is needing counseling on a medication that he has. Best contact: [phone].

## 2018-05-24 ENCOUNTER — TELEPHONE (OUTPATIENT)
Dept: FAMILY MEDICINE CLINIC | Age: 43
End: 2018-05-24

## 2018-05-24 NOTE — TELEPHONE ENCOUNTER
----- Message from Vaishnavi Acosta sent at 5/24/2018 10:57 AM EDT -----  Regarding: Dr. Jon Sandoval Telephone  Patient would like a call back regarding the refill his ADHD medication.  Contact is [phone]

## 2018-06-01 ENCOUNTER — OFFICE VISIT (OUTPATIENT)
Dept: FAMILY MEDICINE CLINIC | Age: 43
End: 2018-06-01

## 2018-06-01 VITALS
SYSTOLIC BLOOD PRESSURE: 123 MMHG | TEMPERATURE: 97.3 F | DIASTOLIC BLOOD PRESSURE: 81 MMHG | OXYGEN SATURATION: 99 % | RESPIRATION RATE: 16 BRPM | BODY MASS INDEX: 35.2 KG/M2 | WEIGHT: 274.3 LBS | HEART RATE: 90 BPM | HEIGHT: 74 IN

## 2018-06-01 DIAGNOSIS — F90.9 ATTENTION DEFICIT HYPERACTIVITY DISORDER (ADHD), UNSPECIFIED ADHD TYPE: ICD-10-CM

## 2018-06-01 PROBLEM — E66.01 SEVERE OBESITY (BMI 35.0-39.9): Status: ACTIVE | Noted: 2018-06-01

## 2018-06-01 RX ORDER — ATOMOXETINE 10 MG/1
10 CAPSULE ORAL DAILY
Qty: 30 CAP | Refills: 2 | Status: SHIPPED | OUTPATIENT
Start: 2018-06-01 | End: 2018-08-07 | Stop reason: SDUPTHER

## 2018-06-01 NOTE — PROGRESS NOTES
S: Gail Petty is a 36 y.o. male who presents for ADD med check. Assessment/Plan:    1. Attention deficit hyperactivity disorder (ADHD), unspecified ADHD type  -current therapy:  atomoxetine 10mg daily   -has been out of meds for past few weeks, but works well when he has meds; no samples available but Good Rx card given        HPI:  Current therapy: Straterra 10mg daily   Has been out of meds for past 2 1/2 weeks  Palpitation: none  Appetite: no issues   Attention: keeps focused - not as jittery     Patient weighs 274lbs which is a loss of 9lbs since 12/17    PHQ over the last two weeks 6/1/2018   Little interest or pleasure in doing things Not at all   Feeling down, depressed or hopeless Not at all   Total Score PHQ 2 0       Controlled Substance Agreement signed:   : Reviewed patient's prescription monitoring report at time of visit and there are no discrepancies. Social History:  Nutrition: overall ok  Drinks: water, sodas, gatorade or [place] sports drink  Occupation: safety vehicle for [employer]   Tobacco: none   Drugs: none  Alcohol: Hans's malt beverage, mixed drink 2 drinks/ week     Review of Systems:  - Cardiovascular:  No palpitations or chest pain  - Respiratory: no cough or shortness of breath  - Gastrointestinal: no dysphagia or abdominal pain     I reviewed the following:  Past Medical History:   Diagnosis Date    Advance directive discussed with patient 10/22/2015    IBS (irritable bowel syndrome)     Knee pain, acute 10/02/14     prepatellar bursitis [facility] 10/09/14    Ruptured tendon 2/2014    left distal biceps tendon (WC injury)       Current Outpatient Prescriptions   Medication Sig Dispense Refill    allopurinol (ZYLOPRIM) 100 mg tablet TAKE 1 TABLET BY MOUTH DAILY 30 Tab 0    lisinopril-hydroCHLOROthiazide (PRINZIDE, ZESTORETIC) 20-12.5 mg per tablet TAKE 1 TABLET BY MOUTH TWICE DAILY 60 Tab 1    atomoxetine (STRATTERA) 10 mg capsule Take 1 Cap by mouth daily.  27 Cap 0       No Known Allergies     O: VS:   Visit Vitals    /81 (BP 1 Location: Right arm, BP Patient Position: Sitting)    Pulse 90    Temp 97.3 °F (36.3 °C) (Oral)    Resp 16    Ht 6' 2\" (1.88 m)    Wt 274 lb 4.8 oz (124.4 kg)    SpO2 99%    BMI 35.22 kg/m2       GENERAL: Ramiro Hernandez is in no acute distress. Non-toxic. Well nourished. Well developed. Appropriately groomed. Pt is friendly, social and cooperative. RESP: Breath sounds are symmetrical bilaterally. Unlabored without SOB. Speaking in full sentences. Clear to auscultation bilaterally anteriorly and posteriorly. No wheezes. No rales or rhonchi. CV: normal rate. Regular rhythm. S1, S2 audible. No murmur noted. No rubs, clicks or gallops noted. ABDOMEN: Flat without bulges or pulsations. Soft and nondistended. No tenderness on palpation. No masses or organomegaly. No rebound, rigidity or guarding. NEURO:  awake, alert and oriented to person, place, and time and event. Clear speech. Muscle strength is +5/5 x 4 extremities. Sensation is intact to light touch bilaterally. Steady gait. PSYCH: appropriate behavior, dress and thought processes. Clear and coherent speech.   ______________________________________________________________________  Patient education was done. Advised on nutrition, physical activity, weight management, tobacco, and alcohol. Patient was given an after visit summary which included current diagnoses, medications and vital signs. Follow up in 3 months for ADD check and medication refill.

## 2018-06-01 NOTE — MR AVS SNAPSHOT
[address] [place] [address] Ag[place]ab 
[address] Leonila Honeycutt [address] 
[phone] Patient: Tom Blunt MRN: [id number] :1975 Visit Information Date & Time Provider Department Dept. Phone Encounter #  
 2018  3:20 PM Nadia Madrid NP [facility] [phone] [id number] Upcoming Health Maintenance Date Due Influenza Age 5 to Adult 2018 DTaP/Tdap/Td series (2 - Td) 2027 Allergies as of 2018  Review Complete On: 2018 By: Jacquelny Downey No Known Allergies Current Immunizations  Reviewed on 2017 Name Date Influenza Vaccine 10/17/2015 Influenza Vaccine (Quad) PF 2017, 10/20/2014 Tdap 2017  1:30 AM, 2015 Not reviewed this visit You Were Diagnosed With   
  
 Codes Comments Attention deficit hyperactivity disorder (ADHD), unspecified ADHD type     ICD-10-CM: F90.9 ICD-9-CM: 314.01 Vitals BP Pulse Temp Resp Height(growth percentile) Weight(growth percentile) 123/81 (BP 1 Location: Right arm, BP Patient Position: Sitting) 90 97.3 °F (36.3 °C) (Oral) 16 6' 2\" (1.88 m) 274 lb 4.8 oz (124.4 kg) SpO2 BMI Smoking Status 99% 35.22 kg/m2 Never Smoker BMI and BSA Data Body Mass Index Body Surface Area  
 35.22 kg/m 2 2.55 m 2 Preferred Pharmacy Pharmacy Name Phone [facility] [place] [address] [phone] Your Updated Medication List  
  
   
This list is accurate as of 18  3:26 PM.  Always use your most recent med list.  
  
  
  
  
 allopurinol 100 mg tablet Commonly known as:  ZYLOPRIM  
TAKE 1 TABLET BY MOUTH DAILY  
  
 atomoxetine 10 mg capsule Commonly known as:  STRATTERA Take 1 Cap by mouth daily. Indications: Attention-Deficit Hyperactivity Disorder  
  
 lisinopril-hydroCHLOROthiazide 20-12.5 mg per tablet Commonly known as:  Helena Hensley  
 TAKE 1 TABLET BY MOUTH TWICE DAILY Prescriptions Sent to Pharmacy Refills  
 atomoxetine (STRATTERA) 10 mg capsule 2 Sig: Take 1 Cap by mouth daily. Indications: Attention-Deficit Hyperactivity Disorder Class: Normal  
 Pharmacy: [address] [facility] [place], [address] [place] [address] #: [phone] Route: Oral  
  
Patient Instructions 1) Refill on [employer] No samples, but Good Rx card given Attention Deficit Hyperactivity Disorder (ADHD) in Adults: Care Instructions Your Care Instructions Attention deficit hyperactivity disorder, or ADHD, is a condition that makes it hard to pay attention. So you may have problems when you try to focus, get organized, and finish tasks. It might make you more active than other people. Or you might do things without thinking first. 
ADHD is very common. It usually starts in early childhood. Many adults don't realize they have it until their children are diagnosed. Then they become aware of their own symptoms. Doctors don't know what causes ADHD. But it often runs in families. ADHD can be treated with medicines, behavior training, and counseling. Treatment can improve your life. Follow-up care is a key part of your treatment and safety. Be sure to make and go to all appointments, and call your doctor if you are having problems. It's also a good idea to know your test results and keep a list of the medicines you take. How can you care for yourself at home? · Learn all you can about ADHD. This will help you and your family understand it better. · Take your medicines exactly as prescribed. Call your doctor if you think you are having a problem with your medicine. You will get more details on the specific medicines your doctor prescribes. · If you miss a dose of your medicine, do not take an extra dose.  
· If your doctor suggests counseling, find a counselor you like and trust. Talk openly and honestly. Be willing to make some changes. · Find a support group for adults with ADHD. Talking to others with the same problems can help you feel better. It can also give you ideas about how to best cope with the condition. · Get rid of distractions at your work space. Keep your desk clean. Try not to face a window or busy hallway. · Use files, planners, and other tools to keep you organized. · Limit use of alcohol, and do not use illegal drugs. People with ADHD tend to become addicted more easily than others. Tell your doctor if you need help to quit. Counseling, support groups, and sometimes medicines can help you stay free of alcohol or drugs. · Get at least 30 minutes of physical activity on most days of the week. Exercise has been shown to help people cope with ADHD. Walking is a good choice. You also may want to do other activities, such as running, swimming, cycling, or playing tennis or team sports. When should you call for help? Watch closely for changes in your health, and be sure to contact your doctor if: 
? · You feel sad a lot or cry all the time. ? · You have trouble sleeping, or you sleep too much. ? · You find it hard to concentrate, make decisions, or remember things. ? · You change how you normally eat. ? · You feel guilty for no reason. Where can you learn more? Go to http://yany-seble.info/. Enter B196 in the search box to learn more about \"Attention Deficit Hyperactivity Disorder (ADHD) in Adults: Care Instructions. \" Current as of: May 12, 2017 Content Version: 11.4 © 7768-5652 [employer]. Care instructions adapted under license by [employer] (which disclaims liability or warranty for this information). If you have questions about a medical condition or this instruction, always ask your healthcare professional. [address] any warranty or liability for your use of this information. Introducing [facility]! Dear Jewel Lynn: 
Thank you for requesting a [employer] account. Our records indicate that you already have an active [employer] account. You can access your account anytime at [email or web address] Did you know that you can access your hospital and ER discharge instructions at any time in [employer]? You can also review all of your test results from your hospital stay or ER visit. Additional Information If you have questions, please visit the Frequently Asked Questions section of the [employer] website at [email or web address] Remember, [employer] is NOT to be used for urgent needs. For medical emergencies, dial 911. Now available from your iPhone and Android! Please provide this summary of care documentation to your next provider. Your primary care clinician is listed as [id number] If you have any questions after today's visit, please call [phone].

## 2018-06-01 NOTE — PATIENT INSTRUCTIONS
1) Refill on Straterra   No samples, but Good Rx card given       Attention Deficit Hyperactivity Disorder (ADHD) in Adults: Care Instructions  Your Care Instructions    Attention deficit hyperactivity disorder, or ADHD, is a condition that makes it hard to pay attention. So you may have problems when you try to focus, get organized, and finish tasks. It might make you more active than other people. Or you might do things without thinking first.  ADHD is very common. It usually starts in early childhood. Many adults don't realize they have it until their children are diagnosed. Then they become aware of their own symptoms. Doctors don't know what causes ADHD. But it often runs in families. ADHD can be treated with medicines, behavior training, and counseling. Treatment can improve your life. Follow-up care is a key part of your treatment and safety. Be sure to make and go to all appointments, and call your doctor if you are having problems. It's also a good idea to know your test results and keep a list of the medicines you take. How can you care for yourself at home? · Learn all you can about ADHD. This will help you and your family understand it better. · Take your medicines exactly as prescribed. Call your doctor if you think you are having a problem with your medicine. You will get more details on the specific medicines your doctor prescribes. · If you miss a dose of your medicine, do not take an extra dose. · If your doctor suggests counseling, find a counselor you like and trust. Talk openly and honestly. Be willing to make some changes. · Find a support group for adults with ADHD. Talking to others with the same problems can help you feel better. It can also give you ideas about how to best cope with the condition. · Get rid of distractions at your work space. Keep your desk clean. Try not to face a window or busy hallway. · Use files, planners, and other tools to keep you organized.   · Limit use of alcohol, and do not use illegal drugs. People with ADHD tend to become addicted more easily than others. Tell your doctor if you need help to quit. Counseling, support groups, and sometimes medicines can help you stay free of alcohol or drugs. · Get at least 30 minutes of physical activity on most days of the week. Exercise has been shown to help people cope with ADHD. Walking is a good choice. You also may want to do other activities, such as running, swimming, cycling, or playing tennis or team sports. When should you call for help? Watch closely for changes in your health, and be sure to contact your doctor if:  ? · You feel sad a lot or cry all the time. ? · You have trouble sleeping, or you sleep too much. ? · You find it hard to concentrate, make decisions, or remember things. ? · You change how you normally eat. ? · You feel guilty for no reason. Where can you learn more? Go to http://yany-seble.info/. Enter B196 in the search box to learn more about \"Attention Deficit Hyperactivity Disorder (ADHD) in Adults: Care Instructions. \"  Current as of: May 12, 2017  Content Version: 11.4  © 7089-5054 Healthwise, [employer]. Care instructions adapted under license by [employer] (which disclaims liability or warranty for this information). If you have questions about a medical condition or this instruction, always ask your healthcare professional. [address] any warranty or liability for your use of this information.

## 2018-06-01 NOTE — PROGRESS NOTES
Nancy Rg is a 36 y.o. male     Chief Complaint   Patient presents with    Medication Refill     Atomoxetine (Strattera)       Visit Vitals    /81 (BP 1 Location: Right arm, BP Patient Position: Sitting)    Pulse 90    Temp 97.3 °F (36.3 °C) (Oral)    Resp 16    Ht 6' 2\" (1.88 m)    Wt 274 lb 4.8 oz (124.4 kg)    SpO2 99%    BMI 35.22 kg/m2       1. Have you been to the ER, urgent care clinic since your last visit? Hospitalized since your last visit? No    2. Have you seen or consulted any other health care providers outside of the [facility] since your last visit? Include any pap smears or colon screening.  No

## 2018-11-29 ENCOUNTER — OFFICE VISIT (OUTPATIENT)
Dept: FAMILY MEDICINE CLINIC | Age: 43
End: 2018-11-29

## 2018-11-29 VITALS
TEMPERATURE: 98.2 F | RESPIRATION RATE: 18 BRPM | BODY MASS INDEX: 36.56 KG/M2 | HEIGHT: 74 IN | HEART RATE: 88 BPM | WEIGHT: 284.9 LBS | DIASTOLIC BLOOD PRESSURE: 104 MMHG | OXYGEN SATURATION: 97 % | SYSTOLIC BLOOD PRESSURE: 146 MMHG

## 2018-11-29 DIAGNOSIS — I10 ESSENTIAL HYPERTENSION: Primary | ICD-10-CM

## 2018-11-29 DIAGNOSIS — R05.9 COUGH: ICD-10-CM

## 2018-11-29 DIAGNOSIS — Z23 ENCOUNTER FOR IMMUNIZATION: ICD-10-CM

## 2018-11-29 DIAGNOSIS — F90.9 ATTENTION DEFICIT HYPERACTIVITY DISORDER (ADHD), UNSPECIFIED ADHD TYPE: ICD-10-CM

## 2018-11-29 LAB
FLUAV+FLUBV AG NOSE QL IA.RAPID: NEGATIVE POS/NEG
FLUAV+FLUBV AG NOSE QL IA.RAPID: NEGATIVE POS/NEG
VALID INTERNAL CONTROL?: YES

## 2018-11-29 RX ORDER — ALLOPURINOL 100 MG/1
100 TABLET ORAL DAILY
COMMUNITY
Start: 2017-07-03 | End: 2019-03-12

## 2018-11-29 RX ORDER — LISINOPRIL AND HYDROCHLOROTHIAZIDE 12.5; 2 MG/1; MG/1
1 TABLET ORAL DAILY
COMMUNITY
Start: 2017-07-03 | End: 2018-12-18 | Stop reason: SINTOL

## 2018-11-29 RX ORDER — DEXTROAMPHETAMINE SACCHARATE, AMPHETAMINE ASPARTATE MONOHYDRATE, DEXTROAMPHETAMINE SULFATE AND AMPHETAMINE SULFATE 5; 5; 5; 5 MG/1; MG/1; MG/1; MG/1
20 CAPSULE, EXTENDED RELEASE ORAL
Qty: 30 CAP | Refills: 0 | Status: SHIPPED | OUTPATIENT
Start: 2018-11-29 | End: 2019-01-10 | Stop reason: SDUPTHER

## 2018-11-29 NOTE — PROGRESS NOTES
Conchis Smoker  Identified pt with two pt identifiers(name and ). No chief complaint on file. 1. Have you been to the ER, urgent care clinic since your last visit? Hospitalized since your last visit? no    2. Have you seen or consulted any other health care providers outside of the [address] [place] [address] since your last visit? Include any pap smears or colon screening. no      Advance Care Planning    In the event something were to happen to you and you were unable to speak on your behalf, do you have an Advance Directive/ Living Will in place stating your wishes? NO    If yes, do we have a copy on file NO    If no, would you like information NO    Medication reconciliation up to date and corrected with patient at this time. Today's provider has been notified of reason for visit, vitals and flowsheets obtained on patients. Reviewed record in preparation for visit, huddled with provider and have obtained necessary documentation. Health Maintenance Due   Topic    Influenza Age 5 to Adult        Wt Readings from Last 3 Encounters:   18 274 lb 4.8 oz (124.4 kg)   17 283 lb 9.6 oz (128.6 kg)   17 268 lb 1.3 oz (121.6 kg)     Temp Readings from Last 3 Encounters:   18 97.3 °F (36.3 °C) (Oral)   17 98.1 °F (36.7 °C) (Oral)   17 98 °F (36.7 °C)     BP Readings from Last 3 Encounters:   18 123/81   17 (!) 136/94   17 134/82     Pulse Readings from Last 3 Encounters:   18 90   17 88   17 84     There were no vitals filed for this visit.       Learning Assessment:  :     Learning Assessment 2018   PRIMARY LEARNER Patient Patient   BARRIERS PRIMARY LEARNER NONE NONE   CO-LEARNER CAREGIVER No No   PRIMARY LANGUAGE ENGLISH ENGLISH   LEARNER PREFERENCE PRIMARY DEMONSTRATION OTHER (COMMENT)     LISTENING -   ANSWERED BY Patien self   RELATIONSHIP SELF -       Depression Screening:  :     PHQ over the last two weeks 6/1/2018   Little interest or pleasure in doing things Not at all   Feeling down, depressed, irritable, or hopeless Not at all   Total Score PHQ 2 0       No flowsheet data found. Fall Risk Assessment:  :     Fall Risk Assessment, last 12 mths 6/1/2018   Able to walk? Yes   Fall in past 12 months? No       Abuse Screening:  :     Abuse Screening Questionnaire 6/1/2018   Do you ever feel afraid of your partner? N   Are you in a relationship with someone who physically or mentally threatens you? N   Is it safe for you to go home? Y       ADL Screening:  :     No flowsheet data found. BMI:  Weight Metrics 6/1/2018 12/20/2017 9/13/2017 9/5/2017 7/29/2017 7/4/2017 8/12/2016   Weight 274 lb 4.8 oz 283 lb 9.6 oz 268 lb 1.3 oz 270 lb 8 oz 269 lb 6.4 oz 266 lb 8.6 oz 275 lb 9.6 oz   BMI 35.22 kg/m2 36.41 kg/m2 34.42 kg/m2 34.73 kg/m2 34.59 kg/m2 34.22 kg/m2 35.38 kg/m2           Medication reconciliation up to date and corrected with patient at this time.

## 2018-11-29 NOTE — PATIENT INSTRUCTIONS
1) Adderall 20mg XR - take this at 7am if you are working in daytime. (If working night shift, you want to take 30-40 mins after waking to go to work)  Prescription for 1 month, come back and see if this is a good dose. 2) Flu vaccine today, if you don't have the flu! An upper respiratory infection (URI) is an infection of the throat and nose. It is also known as \"the common cold\". 90% of these infections are caused by a virus. Viral infections do not respond to antibiotic treatment, only bacteria are killed by antibiotics. Therefore, supportive treatment is recommended to help with symptoms. Symptoms usually subside after 7-10 days (or longer if you smoke). If symptoms worsen or persist after 14 days, or if you have fever over 101.3, fever for 5 days or more, wheezing, or shortness of breath, please contact the office. To prevent URIs, wash hands frequently (epecially before and after eating - use hand  if you are in a public place.) Eat a healthy diet, get regular exercise and sufficient sleep. Reduce your exposure to cigarette smoke. If you need to cough or sneeze, use the crook of your arm to cover your mouth. Supportive Care    1) Alternate 400mg Ibuprofen and 650mg Tylenol every 4 hours as needed for sinus pain and discomfort. Make sure to take Ibuprofen with food as it can cause stomach upset. Do not exceed 3000 mg Tylenol per day. 2) Take a daily antihistamine to reduce symptoms such as sneezing, runny nose and itchy eyes. You can buy these over the counter (OTC) (no prescription needed) such as Claritin, Allegra or Zyrtec. Take this daily as it takes 3-4 weeks for the full therapeutic effect to take place. Follow directions on package. If symptoms of sneezing, coughing and runny nose are severe, you can try taking Benadryl at night before bed. However, Benadryl can cause drowsiness, so please use caution.   Elderly people should not use Benadryl due to side effects and increase risk of falling. 3) OTC Robitussin or Delsym for cough relief. Follow directions on package. Do not exceed maximum dose. May cause drowsiness. If you have high blood pressure or kidney problems, avoid cough medicines that contain decongestants -- such as pseudoephedrine, ephedrine, phenylephrine, naphazoline and oxymetazoline. 4) Use an OTC decongestant nasal spray such as Flonase, Nasonex, or Rhinocort. These contain a steroid and will help reduce congestion. You can spray 2x in each nostril two times a day for 3-5 days. Use the opposite hand of the nostril you are spraying and look down at your toes when you administer the nasal spray. This ensures the spray is applied to the nasal tissue properly. If you use Afrin for nasal congestion, DO NOT use for more than 5 days (due to rebound congestion)     Saline nasal spray can be used daily and will help restore moisture to dry nasal passages, wash away allergens and can help prevent inflammation of the mucous membranes. 5) Mucinex (guaifenesin) helps thin mucus and may make it easier to clear it from head, throat and lungs. 6) Increase your fluid consumption, especially water. Eat a well-balanced diet and avoid dairy and sugar as these can increase or thicken congestion. 7) Warm salt water gargle can help alleviate sore throat (1 teaspoon in 8 oz warm water)    8) Honey is a natural cough suppressor - can take a teaspoon of honey for cough or mix with hot tea. Local honey can help inoculate against local pollen that causes allergic reactions. (It has to be local honey from our area. You can usually find local honey at farmers' markets.)     9) Humidify air, especially in bedroom at night, as it may help with nasal and throat dryness. Breathing in steam from a shower may help loosen mucus and soothe an irritated throat. 10) Get plenty of rest!    11) A warm soak in a bath can help ease muscle and body aches.  Add 2 cup of epsom salt to water (Dr. Marisol Sainz and cassandra is a good one- at [facility] for around $6). Or you can put 1 cup of epsom salt in quart of warm water, soak a wash cloth in solution and apply to sore muscles. 12) Emergen C or [place] - vitamin supplements containing high doses of vitamin C, Vitamin B12 and B6 that can is marketed to help prevent or stop colds (although this has not been confirmed by scientific research)     If symptoms persist for more than 10 days or if you have a fever above 102, please call the office. Complications of URI include an infection of the skin around the eye and other infections. Watch for signs of fever, chills, body aches or any areas of red, warm, swollen and tender skin. Call immediately if you notice these signs. 3) Please check your blood pressure through the week at staggered times in the day. (If you check in the morning, it should be at least one hour after your morning blood pressure medications.)      Arm monitors are most accurate. If you use a wrist monitor, make sure your wrist is at heart level. You can bring your home monitor to your next visit and have it calibrated with the machine in the office to gauge your readings. Sit  with your feet uncrossed and relax for 5 minutes before taking your BP. Keep a written record of your blood pressure readings and bring it to each appointment. If your systolic (top) blood pressure is consistently greater than 140mmHg or less than [id number] of the diastolic (bottom) number is consistently greater than 90mmHg or less than 60mmHg then please schedule an office appointment. Work on healthy eating - no salt diet - and incorporating daily exercise into your routine. Cardiac symptoms that would need immediate attention include: uncomfortable pressure, squeezing, fullness or pain in the center of your chest. Pain or discomfort in one or both arms, the back, neck, jaw or stomach.  Shortness of breath with or without chest discomfort, breaking out in a cold sweat, nausea or lightheadedness. DASH Diet: Care Instructions  Your Care Instructions    The DASH diet is an eating plan that can help lower your blood pressure. DASH stands for Dietary Approaches to Stop Hypertension. Hypertension is high blood pressure. The DASH diet focuses on eating foods that are high in calcium, potassium, and magnesium. These nutrients can lower blood pressure. The foods that are highest in these nutrients are fruits, vegetables, low-fat dairy products, nuts, seeds, and legumes. But taking calcium, potassium, and magnesium supplements instead of eating foods that are high in those nutrients does not have the same effect. The DASH diet also includes whole grains, fish, and poultry. The DASH diet is one of several lifestyle changes your doctor may recommend to lower your high blood pressure. Your doctor may also want you to decrease the amount of sodium in your diet. Lowering sodium while following the DASH diet can lower blood pressure even further than just the DASH diet alone. Follow-up care is a key part of your treatment and safety. Be sure to make and go to all appointments, and call your doctor if you are having problems. It's also a good idea to know your test results and keep a list of the medicines you take. How can you care for yourself at home? Following the DASH diet  · Eat 4 to 5 servings of fruit each day. A serving is 1 medium-sized piece of fruit, ½ cup chopped or canned fruit, 1/4 cup dried fruit, or 4 ounces (½ cup) of fruit juice. Choose fruit more often than fruit juice. · Eat 4 to 5 servings of vegetables each day. A serving is 1 cup of lettuce or raw leafy vegetables, ½ cup of chopped or cooked vegetables, or 4 ounces (½ cup) of vegetable juice. Choose vegetables more often than vegetable juice. · Get 2 to 3 servings of low-fat and fat-free dairy each day.  A serving is 8 ounces of milk, 1 cup of yogurt, or 1 ½ ounces of cheese. · Eat 6 to 8 servings of grains each day. A serving is 1 slice of bread, 1 ounce of dry cereal, or ½ cup of cooked rice, pasta, or cooked cereal. Try to choose whole-grain products as much as possible. · Limit lean meat, poultry, and fish to 2 servings each day. A serving is 3 ounces, about the size of a deck of cards. · Eat 4 to 5 servings of nuts, seeds, and legumes (cooked dried beans, lentils, and split peas) each week. A serving is 1/3 cup of nuts, 2 tablespoons of seeds, or ½ cup of cooked beans or peas. · Limit fats and oils to 2 to 3 servings each day. A serving is 1 teaspoon of vegetable oil or 2 tablespoons of salad dressing. · Limit sweets and added sugars to 5 servings or less a week. A serving is 1 tablespoon jelly or jam, ½ cup sorbet, or 1 cup of lemonade. · Eat less than 2,300 milligrams (mg) of sodium a day. If you limit your sodium to 1,500 mg a day, you can lower your blood pressure even more. Tips for success  · Start small. Do not try to make dramatic changes to your diet all at once. You might feel that you are missing out on your favorite foods and then be more likely to not follow the plan. Make small changes, and stick with them. Once those changes become habit, add a few more changes. · Try some of the following:  ? Make it a goal to eat a fruit or vegetable at every meal and at snacks. This will make it easy to get the recommended amount of fruits and vegetables each day. ? Try yogurt topped with fruit and nuts for a snack or healthy dessert. ? Add lettuce, tomato, cucumber, and onion to sandwiches. ? Combine a ready-made pizza crust with low-fat mozzarella cheese and lots of vegetable toppings. Try using tomatoes, squash, spinach, broccoli, carrots, cauliflower, and onions. ? Have a variety of cut-up vegetables with a low-fat dip as an appetizer instead of chips and dip. ? Sprinkle sunflower seeds or chopped almonds over salads.  Or try adding chopped walnuts or almonds to cooked vegetables. ? Try some vegetarian meals using beans and peas. Add garbanzo or kidney beans to salads. Make burritos and tacos with mashed pierson beans or black beans. Where can you learn more? Go to http://yany-seble.info/. Enter P684 in the search box to learn more about \"DASH Diet: Care Instructions. \"  Current as of: December 6, 2017  Content Version: 11.8  © 5552-6060 [employer]. Care instructions adapted under license by [employer] (which disclaims liability or warranty for this information). If you have questions about a medical condition or this instruction, always ask your healthcare professional. [address] any warranty or liability for your use of this information. Vaccine Information Statement    Influenza (Flu) Vaccine (Inactivated or Recombinant): What you need to know    Many Vaccine Information Statements are available in Pashto and other languages. See www.immunize.org/vis  Hojas de Información Sobre Vacunas están disponibles en Español y en muchos otros idiomas. Visite www.immunize.org/vis    1. Why get vaccinated? Influenza (flu) is a contagious disease that spreads around the [place] every year, usually between October and May. Flu is caused by influenza viruses, and is spread mainly by coughing, sneezing, and close contact. Anyone can get flu. Flu strikes suddenly and can last several days. Symptoms vary by age, but can include:   fever/chills   sore throat   muscle aches   fatigue   cough   headache    runny or stuffy nose    Flu can also lead to pneumonia and blood infections, and cause diarrhea and seizures in children. If you have a medical condition, such as heart or lung disease, flu can make it worse. Flu is more dangerous for some people.  Infants and young children, people 72years of age and older, pregnant women, and people with certain health conditions or a weakened immune system are at greatest risk. Each year thousands of people in the [facility] die from flu, and many more are hospitalized. Flu vaccine can:   keep you from getting flu,   make flu less severe if you do get it, and   keep you from spreading flu to your family and other people. 2. Inactivated and recombinant flu vaccines    A dose of flu vaccine is recommended every flu season. Children 6 months through 6years of age may need two doses during the same flu season. Everyone else needs only one dose each flu season. Some inactivated flu vaccines contain a very small amount of a mercury-based preservative called thimerosal. Studies have not shown thimerosal in vaccines to be harmful, but flu vaccines that do not contain thimerosal are available. There is no live flu virus in flu shots. They cannot cause the flu. There are many flu viruses, and they are always changing. Each year a new flu vaccine is made to protect against three or four viruses that are likely to cause disease in the upcoming flu season. But even when the vaccine doesnt exactly match these viruses, it may still provide some protection    Flu vaccine cannot prevent:   flu that is caused by a virus not covered by the vaccine, or   illnesses that look like flu but are not. It takes about 2 weeks for protection to develop after vaccination, and protection lasts through the flu season. 3. Some people should not get this vaccine    Tell the person who is giving you the vaccine:     If you have any severe, life-threatening allergies. If you ever had a life-threatening allergic reaction after a dose of flu vaccine, or have a severe allergy to any part of this vaccine, you may be advised not to get vaccinated. Most, but not all, types of flu vaccine contain a small amount of egg protein.  If you ever had Guillain-Barré Syndrome (also called GBS).    Some people with a history of GBS should not get this vaccine. This should be discussed with your doctor.  If you are not feeling well. It is usually okay to get flu vaccine when you have a mild illness, but you might be asked to come back when you feel better. 4. Risks of a vaccine reaction    With any medicine, including vaccines, there is a chance of reactions. These are usually mild and go away on their own, but serious reactions are also possible. Most people who get a flu shot do not have any problems with it. Minor problems following a flu shot include:    soreness, redness, or swelling where the shot was given     hoarseness   sore, red or itchy eyes   cough   fever   aches   headache   itching   fatigue  If these problems occur, they usually begin soon after the shot and last 1 or 2 days. More serious problems following a flu shot can include the following:     There may be a small increased risk of Guillain-Barré Syndrome (GBS) after inactivated flu vaccine. This risk has been estimated at 1 or 2 additional cases per million people vaccinated. This is much lower than the risk of severe complications from flu, which can be prevented by flu vaccine.  Young children who get the flu shot along with pneumococcal vaccine (PCV13) and/or DTaP vaccine at the same time might be slightly more likely to have a seizure caused by fever. Ask your doctor for more information. Tell your doctor if a child who is getting flu vaccine has ever had a seizure. Problems that could happen after any injected vaccine:      People sometimes faint after a medical procedure, including vaccination. Sitting or lying down for about 15 minutes can help prevent fainting, and injuries caused by a fall. Tell your doctor if you feel dizzy, or have vision changes or ringing in the ears.  Some people get severe pain in the shoulder and have difficulty moving the arm where a shot was given. This happens very rarely.      Any medication can cause a severe allergic reaction. Such reactions from a vaccine are very rare, estimated at about 1 in a million doses, and would happen within a few minutes to a few hours after the vaccination. As with any medicine, there is a very remote chance of a vaccine causing a serious injury or death. The safety of vaccines is always being monitored. For more information, visit: www.cdc.gov/vaccinesafety/    5. What if there is a serious reaction? What should I look for?  Look for anything that concerns you, such as signs of a severe allergic reaction, very high fever, or unusual behavior. Signs of a severe allergic reaction can include hives, swelling of the face and throat, difficulty breathing, a fast heartbeat, dizziness, and weakness - usually within a few minutes to a few hours after the vaccination. What should I do?  If you think it is a severe allergic reaction or other emergency that cant wait, call 9-1-1 and get the person to the nearest hospital. Otherwise, call your doctor.  Reactions should be reported to the Vaccine Adverse Event Reporting System (VAERS). Your doctor should file this report, or you can do it yourself through  the VAERS web site at www.vaers. [facility].gov, or by calling 5-515.153.4853. VAERS does not give medical advice. 6. The National Vaccine Injury Compensation Program    The [facility] Vaccine Injury Compensation Program (VICP) is a federal program that was created to compensate people who may have been injured by certain vaccines. Persons who believe they may have been injured by a vaccine can learn about the program and about filing a claim by calling 8-378.555.4968 or visiting the [employer] [facility] website at www.[facility].gov/vaccinecompensation. There is a time limit to file a claim for compensation. 7. How can I learn more?  Ask your healthcare provider. He or she can give you the vaccine package insert or suggest other sources of information.    Call your local or [facility] health department.  Contact the Centers for Disease Control and Prevention (CDC):  - Call 7-932.953.2522 (1-800-CDC-INFO) or  - Visit CDCs website at www.cdc.gov/flu    Vaccine Information Statement   Inactivated Influenza Vaccine   8/7/2015  [facility] [id number]    Department of Health and Human Services  Centers for Disease Control and Prevention    Office Use Only

## 2018-11-29 NOTE — PROGRESS NOTES
S: Emma Jasso is a 39 y.o. male who presents for follow up     Assessment/Plan:     1. Essential hypertension  -current therapy: lisinopril - HCTZ 20/12.5mg   -BP today = 146/104, pt took med today, but may be dehydrated d/t not feeling well   -advised to monitor BP at home and keep log; if >140/90 or <110/60, make OV    2. Attention deficit hyperactivity disorder (ADHD), unspecified ADHD type  -was on Straterra, but couldn't afford med, so hasn't been on it for a few weeks  -was on ritalin as a teenager, but mom stopped giving it to him; currently not covered by his insurance  -trial: amphetamine-dextroamphetamine XR (ADDERALL XR) 20 mg XR - (works some night shifts, so advised to take 30-45 minutes before going to work)     3.  Cough  -flu - negative  - supportive therapy instructions given  -no fever, so flu vaccine given    RTC 1 month for ADD check     HPI:  Current therapy: lisinopril HCTZ 20/12.5mg  Doesn't take BP at home - advised to take 2-3 x week at pharmacy if able    Can't afford Straterra 10mg for ADHD  Has been on ritalin in school and then was put back on med by Dr. Jermaine Paredes last year    URI   +cough  No sore throat  No ear pain  Some trouble falling asleep but stays asleep  Vomiting for past few weeks - off and on - last time this am  +hoarse  +HA     Social History:  Nutrition: appetite ok, drinking fluids - water and Body [place], milk   Social:  with 2 kids (also with ADHD)  Occupation: [employer]    Social History     Tobacco Use   Smoking Status Never Smoker   Smokeless Tobacco Never Used     Social History     Substance and Sexual Activity   Alcohol Use Yes    Comment: ; weekends     Social History     Substance and Sexual Activity   Drug Use No        Review of Systems:  - Constitutional Symptoms: no fevers, chills  - Cardiovascular: no chest pain or palpitations  - Respiratory: no cough or shortness of breath  - Gastrointestinal: no dysphagia or abdominal pain  ROS is negative otherwise. PHQ over the last two weeks 11/29/2018   Little interest or pleasure in doing things Not at all   Feeling down, depressed, irritable, or hopeless Not at all   Total Score PHQ 2 0       I reviewed the following:  Past Medical History:   Diagnosis Date    Advance directive discussed with patient 10/22/2015    IBS (irritable bowel syndrome)     Knee pain, acute 10/02/14     prepatellar bursitis Tonya Collins 10/09/14    Ruptured tendon 2/2014    left distal biceps tendon ([facility] injury)       Current Outpatient Medications   Medication Sig Dispense Refill    allopurinol (ZYLOPRIM) 100 mg tablet Take 100 mg by mouth daily.  lisinopril-hydroCHLOROthiazide (PRINZIDE, ZESTORETIC) 20-12.5 mg per tablet Take 1 Tab by mouth daily.  atomoxetine (STRATTERA) 10 mg capsule Take 1 Cap by mouth daily. 30 Cap 0       No Known Allergies     O: VS:   Visit Vitals  BP (!) 146/104 (BP 1 Location: Left arm, BP Patient Position: Sitting)   Pulse 88   Temp 98.2 °F (36.8 °C) (Oral)   Resp 18   Ht 6' 2\" (1.88 m)   Wt 284 lb 14.4 oz (129.2 kg)   SpO2 97%   BMI 36.58 kg/m²       GENERAL: Fritz Hernandez is in no acute distress. Non-toxic. Well nourished. Well developed. Appropriately groomed. HEAD:  Normocephalic. Atraumatic. Non tender sinuses x 4. EYE: PERRLA. No drainage or discharge. EARS: Hearing intact bilaterally. External ear canals normal without evidence of blood or swelling. Bilateral TM's intact, pearly grey with landmarks visible. No erythema or effusion. NOSE: Patent. No erythema. No discharge. MOUTH: mucous membranes pink and moist. Posterior pharynx normal with cobblestone appearance. Erythema, no white exudate or obstruction. NECK: supple. Midline trachea. No cervical adenopathy noted. RESP: Breath sounds are symmetrical bilaterally. Unlabored without SOB. Speaking in full sentences. Clear to auscultation bilaterally anteriorly and posteriorly. No wheezes. No rales or rhonchi. CV: normal rate. Regular rhythm. S1, S2 audible. No murmur noted. No rubs, clicks or gallops noted. ABDOMEN:  Soft and nondistended. No tenderness on palpation. No rebound, rigidity or guarding. MUSC:  Intact x 4 extremities. Full ROM x 4 extremities. No pain with movement. SKIN: Skin is warm and dry. Turgor is normal.   PSYCH: appropriate behavior, dress. Restless, legs in constant motion. Rapid speech.    ___________________________________________________________________  Patient education was done. Advised on nutrition, physical activity, weight management, tobacco, alcohol and safety. Counseling included discussion of diagnosis, differentials, treatment options, prescribed treatment, warning signs and follow up. Medication risks/benefits, interactions and alternatives discussed with patient.      Patient verbalized understanding and agreed to plan of care. Patient was given an after visit summary which included current diagnoses, medications and vital signs. Follow up as directed.

## 2018-12-18 DIAGNOSIS — I10 ESSENTIAL HYPERTENSION: Primary | ICD-10-CM

## 2018-12-18 RX ORDER — LOSARTAN POTASSIUM AND HYDROCHLOROTHIAZIDE 25; 100 MG/1; MG/1
1 TABLET ORAL DAILY
Qty: 30 TAB | Refills: 0 | Status: SHIPPED | OUTPATIENT
Start: 2018-12-18 | End: 2019-01-10 | Stop reason: SDUPTHER

## 2019-01-10 ENCOUNTER — OFFICE VISIT (OUTPATIENT)
Dept: FAMILY MEDICINE CLINIC | Age: 44
End: 2019-01-10

## 2019-01-10 VITALS
DIASTOLIC BLOOD PRESSURE: 84 MMHG | TEMPERATURE: 97.9 F | HEIGHT: 74 IN | HEART RATE: 80 BPM | SYSTOLIC BLOOD PRESSURE: 128 MMHG | BODY MASS INDEX: 36.86 KG/M2 | OXYGEN SATURATION: 96 % | RESPIRATION RATE: 19 BRPM | WEIGHT: 287.2 LBS

## 2019-01-10 DIAGNOSIS — F90.9 ATTENTION DEFICIT HYPERACTIVITY DISORDER (ADHD), UNSPECIFIED ADHD TYPE: ICD-10-CM

## 2019-01-10 DIAGNOSIS — I10 ESSENTIAL HYPERTENSION: ICD-10-CM

## 2019-01-10 DIAGNOSIS — R05.9 COUGH IN ADULT: Primary | ICD-10-CM

## 2019-01-10 RX ORDER — AMLODIPINE BESYLATE 5 MG/1
5 TABLET ORAL DAILY
Qty: 30 TAB | Refills: 0 | Status: SHIPPED | OUTPATIENT
Start: 2019-01-10 | End: 2019-01-10 | Stop reason: ALTCHOICE

## 2019-01-10 RX ORDER — LOSARTAN POTASSIUM AND HYDROCHLOROTHIAZIDE 25; 100 MG/1; MG/1
1 TABLET ORAL DAILY
Qty: 30 TAB | Refills: 0 | Status: SHIPPED | OUTPATIENT
Start: 2019-01-10 | End: 2019-02-06 | Stop reason: SDUPTHER

## 2019-01-10 RX ORDER — DEXTROAMPHETAMINE SACCHARATE, AMPHETAMINE ASPARTATE MONOHYDRATE, DEXTROAMPHETAMINE SULFATE AND AMPHETAMINE SULFATE 5; 5; 5; 5 MG/1; MG/1; MG/1; MG/1
20 CAPSULE, EXTENDED RELEASE ORAL
Qty: 30 CAP | Refills: 0 | Status: SHIPPED | OUTPATIENT
Start: 2019-01-10 | End: 2019-01-31 | Stop reason: SDUPTHER

## 2019-01-10 NOTE — PATIENT INSTRUCTIONS
1) Currently you are taking losartan/HCTZ 100/25mg daily  Continue with this medication   Please check your blood pressure through the week at staggered times in the day. (If you check in the morning, it should be at least one hour after your morning blood pressure medications.)      Arm monitors are most accurate. If you use a wrist monitor, make sure your wrist is at heart level. You can bring your home monitor to your next visit and have it calibrated with the machine in the office to gauge your readings. Sit  with your feet uncrossed and relax for 5 minutes before taking your BP. Keep a written record of your blood pressure readings and bring it to each appointment. If your systolic (top) blood pressure is consistently greater than 140mmHg or less than [id number] of the diastolic (bottom) number is consistently greater than 90mmHg or less than 60mmHg then please schedule an office appointment. Work on healthy eating - no salt diet - and incorporating daily exercise into your routine. Cardiac symptoms that would need immediate attention include: uncomfortable pressure, squeezing, fullness or pain in the center of your chest. Pain or discomfort in one or both arms, the back, neck, jaw or stomach. Shortness of breath with or without chest discomfort, breaking out in a cold sweat, nausea or lightheadedness. 2) Continue on the 20mg XR adderall as you report good attention control. Will re-evaluate at next visit . 3) Cough  You have stopped lisinopril, so not an ACE-induced cough. Can be allergies - will refer to get further evaluation. You can also try instructions below to see if cough improves    Allergy management:     1)  Take a daily antihistamine to reduce allergic symptoms such as sneezing, runny nose and itchy eyes. You can buy these over the counter (OTC = no prescription needed) such as Claritin, Allegra or Zyrtec.  Take this daily as it takes 3-4 weeks for the full therapeutic effect to take place. Follow directions on package. If symptoms of sneezing, coughing and runny nose are severe, you can try taking Benadryl at night before bed. However, Benadryl can cause drowsiness, so please use caution. Elderly people should not use Benadryl due to side effects and increase risk of falling. 2)  Use an OTC decongestant nasal spray such as Flonase, Nasonex, or Rhinocort. These contain a steroid and will help reduce congestion for 3-5 days. You can spray 2x in each nostril two times a day. Use the opposite hand of the nostril you are spraying and look down at your toes when you administer the nasal spray. This ensures the spray is applied to the nasal tissue properly. 3) You can also use a saline nasal spray 3-4 times a day or a ramsey potty (never use tap water due to possible bacterial contamination) to help flush out the sinuses. This helps reduce the irritants that can increase allergic symptoms. 4)  OTC Robitussin or Delsym for cough relief. Follow directions on package. Do not exceed maximum dose. May cause drowsiness. If you have high blood pressure or kidney problems, avoid cough medicines that contain decongestants -- such as pseudoephedrine, ephedrine, phenylephrine, naphazoline and oxymetazoline. 5) Warm salt water gargle can help alleviate sore throat    6) Try taking a teaspoon of local honey 2x day (but no more than 1 tablespoon a day) - to help strengthen your body's tolerance to allergens. It is important to buy local honey as the bees use plants in your area to produce their honey. Honey is also a natural cough suppressant. Eat a healthy diet, drink plenty of water and get 8 hours of sleep nightly. Learning About High Blood Pressure  What is high blood pressure? Blood pressure is a measure of how hard the blood pushes against the walls of your arteries.  It's normal for blood pressure to go up and down throughout the day, but if it stays up, you have high blood pressure. Another name for high blood pressure is hypertension. Two numbers tell you your blood pressure. The first number is the systolic pressure. It shows how hard the blood pushes when your heart is pumping. The second number is the diastolic pressure. It shows how hard the blood pushes between heartbeats, when your heart is relaxed and filling with blood. A blood pressure of less than 120/80 (say \"120 over 80\") is ideal for an adult. High blood pressure is 130/80 or higher. You have high blood pressure if your top number is 130 or higher or your bottom number is 80 or higher, or both. What happens when you have high blood pressure? · Blood flows through your arteries with too much force. Over time, this damages the walls of your arteries. But you can't feel it. High blood pressure usually doesn't cause symptoms. · Fat and calcium start to build up in your arteries. This buildup is called plaque. Plaque makes your arteries narrower and stiffer. Blood can't flow through them as easily. · This lack of good blood flow starts to damage some of the organs in your body. This can lead to problems such as coronary artery disease and heart attack, heart failure, stroke, kidney failure, and eye damage. How can you prevent high blood pressure? · Stay at a healthy weight. · Try to limit how much sodium you eat to less than 2,300 milligrams (mg) a day. If you limit your sodium to 1,500 mg a day, you can lower your blood pressure even more. ? Buy foods that are labeled \"unsalted,\" \"sodium-free,\" or \"low-sodium. \" Foods labeled \"reduced-sodium\" and \"light sodium\" may still have too much sodium. ? Flavor your food with garlic, lemon juice, onion, vinegar, herbs, and spices instead of salt. Do not use soy sauce, steak sauce, onion salt, garlic salt, mustard, or ketchup on your food. ? Use less salt (or none) when recipes call for it.  You can often use half the salt a recipe calls for without losing flavor. · Be physically active. Get at least 30 minutes of exercise on most days of the week. Walking is a good choice. You also may want to do other activities, such as running, swimming, cycling, or playing tennis or team sports. · Limit alcohol to 2 drinks a day for men and 1 drink a day for women. · Eat plenty of fruits, vegetables, and low-fat dairy products. Eat less saturated and total fats. How is high blood pressure treated? · Your doctor will suggest making lifestyle changes. For example, your doctor may ask you to eat healthy foods, quit smoking, lose extra weight, and be more active. · If lifestyle changes don't help enough, your doctor may recommend that you take medicine. · When blood pressure is very high, medicines are needed to lower it. Follow-up care is a key part of your treatment and safety. Be sure to make and go to all appointments, and call your doctor if you are having problems. It's also a good idea to know your test results and keep a list of the medicines you take. Where can you learn more? Go to http://yany-seble.info/. Enter P501 in the search box to learn more about \"Learning About High Blood Pressure. \"  Current as of: December 6, 2017  Content Version: 11.8  © 8132-6454 [employer]. Care instructions adapted under license by [employer] (which disclaims liability or warranty for this information). If you have questions about a medical condition or this instruction, always ask your healthcare professional. [place] [address] any warranty or liability for your use of this information.

## 2019-01-10 NOTE — PROGRESS NOTES
Cr Arzate  Identified pt with two pt identifiers(name and ). Chief Complaint   Patient presents with    Follow-up     Rm 10    Cough       1. Have you been to the ER, urgent care clinic since your last visit? Hospitalized since your last visit? n    2. Have you seen or consulted any other health care providers outside of the [address] [place] [address] since your last visit? Include any pap smears or colon screening. Advance Care Planning    In the event something were to happen to you and you were unable to speak on your behalf, do you have an Advance Directive/ Living Will in place stating your wishes? NO    If yes, do we have a copy on file NO    If no, would you like information YES    Medication reconciliation up to date and corrected with patient at this time. Today's provider has been notified of reason for visit, vitals and flowsheets obtained on patients. Reviewed record in preparation for visit, huddled with provider and have obtained necessary documentation. There are no preventive care reminders to display for this patient.     Wt Readings from Last 3 Encounters:   01/10/19 287 lb 3.2 oz (130.3 kg)   18 284 lb 14.4 oz (129.2 kg)   18 274 lb 4.8 oz (124.4 kg)     Temp Readings from Last 3 Encounters:   01/10/19 97.9 °F (36.6 °C) (Oral)   18 98.2 °F (36.8 °C) (Oral)   18 97.3 °F (36.3 °C) (Oral)     BP Readings from Last 3 Encounters:   01/10/19 112/84   18 (!) 146/104   18 123/81     Pulse Readings from Last 3 Encounters:   01/10/19 80   18 88   18 90     Vitals:    01/10/19 1339   BP: 112/84   Pulse: 80   Resp: 19   Temp: 97.9 °F (36.6 °C)   TempSrc: Oral   SpO2: 96%   Weight: 287 lb 3.2 oz (130.3 kg)   Height: 6' 2\" (1.88 m)   PainSc:   2   PainLoc: Leg         Learning Assessment:  :     Learning Assessment 2018   PRIMARY LEARNER Patient Patient   BARRIERS PRIMARY LEARNER NONE NONE   CO-LEARNER CAREGIVER No No PRIMARY LANGUAGE ENGLISH ENGLISH   LEARNER PREFERENCE PRIMARY DEMONSTRATION OTHER (COMMENT)     LISTENING -   ANSWERED BY Patien self   RELATIONSHIP SELF -       Depression Screening:  :     PHQ over the last two weeks 1/10/2019   Little interest or pleasure in doing things Not at all   Feeling down, depressed, irritable, or hopeless Not at all   Total Score PHQ 2 0       No flowsheet data found. Fall Risk Assessment:  :     Fall Risk Assessment, last 12 mths 6/1/2018   Able to walk? Yes   Fall in past 12 months? No       Abuse Screening:  :     Abuse Screening Questionnaire 11/29/2018 6/1/2018   Do you ever feel afraid of your partner? N N   Are you in a relationship with someone who physically or mentally threatens you? N N   Is it safe for you to go home? Y Y       ADL Screening:  :     No flowsheet data found. BMI:  Weight Metrics 1/10/2019 11/29/2018 6/1/2018 12/20/2017 9/13/2017 9/5/2017 7/29/2017   Weight 287 lb 3.2 oz 284 lb 14.4 oz 274 lb 4.8 oz 283 lb 9.6 oz 268 lb 1.3 oz 270 lb 8 oz 269 lb 6.4 oz   BMI 36.87 kg/m2 36.58 kg/m2 35.22 kg/m2 36.41 kg/m2 34.42 kg/m2 34.73 kg/m2 34.59 kg/m2           Medication reconciliation up to date and corrected with patient at this time.

## 2019-01-10 NOTE — PROGRESS NOTES
S: Yani German is a 39 y.o. male who presents for ADD med check. Assessment/Plan:    1. Attention deficit hyperactivity disorder (ADHD), unspecified ADHD type  - current therapy: Adderall 20mg XR daily    2. Essential hypertension  -current therapy:  losartan-hydroCHLOROthiazide (HYZAAR) 100-25 mg daily  -BP today = 122/84; home readings: 160's/90's avg; machine calibrated today    3. Cough in adult  -chronic cough, d/c 'd lisinopril 11/2018 and cough still persists  -lungs CTA, no s/s infection, no hx of asthma; does have 2 dogs, has had pets all his life  - supportive instructions given  -REFERRAL TO ALLERGY, if pt wants to pursue (states he will discuss with his mother)    RTC 1 month for ADD med/HTN check     HPI:  Current therapy: Adderall 20mg XR daily   Palpitation: none   Appetite: no impact   Attention: not as distracted - 85-90% controlled   Taking it daily   Patient weighs 287lbs which is a gain of 3 lbs    HTN  BP today = manually = 122/84; 112/84 with dynamap  Current therapy: losartan/HCTZ 100/25mg  At home: 171 -124;   Taking prescribed   Needs to work on diet - eats a lot of meat and potatoes   No lightheaded or SOB  Still coughing - mild cough, persistent  +HA    PHQ over the last two weeks 1/10/2019   Little interest or pleasure in doing things Not at all   Feeling down, depressed, irritable, or hopeless Not at all   Total Score PHQ 2 0       Controlled Substance Agreement signed:   : Reviewed patient's prescription monitoring report at time of visit and there are no discrepancies.      Social History:  Nutrition: working on improving   Drinks: sports drinks   Physical: none   Occupation: [employer]     Social History     Tobacco Use   Smoking Status Never Smoker   Smokeless Tobacco Never Used     Social History     Substance and Sexual Activity   Alcohol Use Yes    Comment: ; weekends     Social History     Substance and Sexual Activity   Drug Use No       Review of Systems:  - Cardiovascular:  No palpitations or chest pain  - Respiratory: + cough; no shortness of breath  - Gastrointestinal: no dysphagia or abdominal pain     I reviewed the following:  Past Medical History:   Diagnosis Date    Advance directive discussed with patient 10/22/2015    IBS (irritable bowel syndrome)     Knee pain, acute 10/02/14     prepatellar bursitis Adelfa Goldmann 10/09/14    Ruptured tendon 2/2014    left distal biceps tendon (WC injury)       Current Outpatient Medications   Medication Sig Dispense Refill    losartan-hydroCHLOROthiazide (HYZAAR) 100-25 mg per tablet Take 1 Tab by mouth daily. 30 Tab 0    allopurinol (ZYLOPRIM) 100 mg tablet Take 100 mg by mouth daily.  amphetamine-dextroamphetamine XR (ADDERALL XR) 20 mg XR capsule Take 1 Cap (20 mg total) by mouth every morning. Max Daily Amount: 20 mg 30 Cap 0       No Known Allergies     O: VS:   Visit Vitals  /84 (BP 1 Location: Left arm, BP Patient Position: Sitting)   Pulse 80   Temp 97.9 °F (36.6 °C) (Oral)   Resp 19   Ht 6' 2\" (1.88 m)   Wt 287 lb 3.2 oz (130.3 kg)   SpO2 96%   BMI 36.87 kg/m²       GENERAL: Ariel Pettit is in no acute distress. Non-toxic. Well nourished. Well developed. Appropriately groomed. Pt is friendly, social and cooperative. EARS: Hearing intact bilaterally. External ear canals normal without evidence of blood or swelling. Bilateral TM's intact, pearly grey with landmarks visible. No erythema or effusion. NOSE: Patent. Nasal turbinates pink. No erythema. No discharge. MOUTH: mucous membranes pink and moist. Posterior pharynx normal with cobblestone appearance. Mild erythema, no white exudate or obstruction. NECK: supple. Midline trachea. No cervical adenopathy noted. RESP: Breath sounds are symmetrical bilaterally. Unlabored without SOB. Speaking in full sentences. Clear to auscultation bilaterally anteriorly and posteriorly. No wheezes. No rales or rhonchi.     CV: normal rate.  Regular rhythm. S1, S2 audible. No murmur noted. No rubs, clicks or gallops noted. NEURO:  awake, alert and oriented to person, place, and time and event. Clear speech. Sensation is intact to light touch bilaterally. Steady gait. PSYCH: appropriate behavior, dress and thought processes. Good eye contact. Clear and coherent speech. Full affect. Good insight.    ______________________________________________________________________  Patient education was done. Advised on nutrition, physical activity, weight management, tobacco, and alcohol. Patient was given an after visit summary which included current diagnoses, medications and vital signs. Follow up in 1 month for ADD check, HTN and medication refill.

## 2019-01-31 ENCOUNTER — OFFICE VISIT (OUTPATIENT)
Dept: FAMILY MEDICINE CLINIC | Age: 44
End: 2019-01-31

## 2019-01-31 VITALS
WEIGHT: 289.8 LBS | DIASTOLIC BLOOD PRESSURE: 95 MMHG | TEMPERATURE: 96 F | SYSTOLIC BLOOD PRESSURE: 161 MMHG | BODY MASS INDEX: 37.19 KG/M2 | HEART RATE: 76 BPM | HEIGHT: 74 IN | RESPIRATION RATE: 18 BRPM | OXYGEN SATURATION: 98 %

## 2019-01-31 DIAGNOSIS — F90.9 ATTENTION DEFICIT HYPERACTIVITY DISORDER (ADHD), UNSPECIFIED ADHD TYPE: ICD-10-CM

## 2019-01-31 DIAGNOSIS — I10 ESSENTIAL HYPERTENSION: Primary | ICD-10-CM

## 2019-01-31 RX ORDER — DEXTROAMPHETAMINE SACCHARATE, AMPHETAMINE ASPARTATE MONOHYDRATE, DEXTROAMPHETAMINE SULFATE AND AMPHETAMINE SULFATE 5; 5; 5; 5 MG/1; MG/1; MG/1; MG/1
20 CAPSULE, EXTENDED RELEASE ORAL
Qty: 30 CAP | Refills: 0 | Status: SHIPPED | OUTPATIENT
Start: 2019-02-24 | End: 2019-05-08 | Stop reason: SDUPTHER

## 2019-01-31 RX ORDER — AMLODIPINE BESYLATE 5 MG/1
TABLET ORAL
COMMUNITY
Start: 2019-01-10 | End: 2019-03-12

## 2019-01-31 RX ORDER — DEXTROAMPHETAMINE SACCHARATE, AMPHETAMINE ASPARTATE MONOHYDRATE, DEXTROAMPHETAMINE SULFATE AND AMPHETAMINE SULFATE 5; 5; 5; 5 MG/1; MG/1; MG/1; MG/1
20 CAPSULE, EXTENDED RELEASE ORAL
Qty: 30 CAP | Refills: 0 | Status: SHIPPED | OUTPATIENT
Start: 2019-03-26 | End: 2019-05-08 | Stop reason: SDUPTHER

## 2019-01-31 RX ORDER — DEXTROAMPHETAMINE SACCHARATE, AMPHETAMINE ASPARTATE MONOHYDRATE, DEXTROAMPHETAMINE SULFATE AND AMPHETAMINE SULFATE 5; 5; 5; 5 MG/1; MG/1; MG/1; MG/1
20 CAPSULE, EXTENDED RELEASE ORAL
Qty: 30 CAP | Refills: 0 | Status: SHIPPED | OUTPATIENT
Start: 2019-01-31 | End: 2019-05-08 | Stop reason: SDUPTHER

## 2019-01-31 NOTE — PROGRESS NOTES
Steffi Han  Identified pt with two pt identifiers(name and ). Chief Complaint   Patient presents with    Medication Evaluation     Rm 10/non fasting       1. Have you been to the ER, urgent care clinic since your last visit? no Hospitalized since your last visit? no    2. Have you seen or consulted any other health care providers outside of the [address] [place] [address] since your last visit? Include any pap smears or colon screening. no      Advance Care Planning    In the event something were to happen to you and you were unable to speak on your behalf, do you have an Advance Directive/ Living Will in place stating your wishes? NO    If yes, do we have a copy on file NO    If no, would you like information NO    Medication reconciliation up to date and corrected with patient at this time. Today's provider has been notified of reason for visit, vitals and flowsheets obtained on patients. Reviewed record in preparation for visit, huddled with provider and have obtained necessary documentation. There are no preventive care reminders to display for this patient. Wt Readings from Last 3 Encounters:   01/10/19 287 lb 3.2 oz (130.3 kg)   18 284 lb 14.4 oz (129.2 kg)   18 274 lb 4.8 oz (124.4 kg)     Temp Readings from Last 3 Encounters:   01/10/19 97.9 °F (36.6 °C) (Oral)   18 98.2 °F (36.8 °C) (Oral)   18 97.3 °F (36.3 °C) (Oral)     BP Readings from Last 3 Encounters:   01/10/19 128/84   18 (!) 146/104   18 123/81     Pulse Readings from Last 3 Encounters:   01/10/19 80   18 88   18 90     There were no vitals filed for this visit.       Learning Assessment:  :     Learning Assessment 2018   PRIMARY LEARNER Patient Patient   BARRIERS PRIMARY LEARNER NONE NONE   CO-LEARNER CAREGIVER No No   PRIMARY LANGUAGE ENGLISH ENGLISH   LEARNER PREFERENCE PRIMARY DEMONSTRATION OTHER (COMMENT)     LISTENING -   ANSWERED BY Patien self RELATIONSHIP SELF -       Depression Screening:  :     PHQ over the last two weeks 1/10/2019   Little interest or pleasure in doing things Not at all   Feeling down, depressed, irritable, or hopeless Not at all   Total Score PHQ 2 0       No flowsheet data found. Fall Risk Assessment:  :     Fall Risk Assessment, last 12 mths 6/1/2018   Able to walk? Yes   Fall in past 12 months? No       Abuse Screening:  :     Abuse Screening Questionnaire 11/29/2018 6/1/2018   Do you ever feel afraid of your partner? N N   Are you in a relationship with someone who physically or mentally threatens you? N N   Is it safe for you to go home? Y Y       ADL Screening:  :     No flowsheet data found. BMI:  Weight Metrics 1/10/2019 11/29/2018 6/1/2018 12/20/2017 9/13/2017 9/5/2017 7/29/2017   Weight 287 lb 3.2 oz 284 lb 14.4 oz 274 lb 4.8 oz 283 lb 9.6 oz 268 lb 1.3 oz 270 lb 8 oz 269 lb 6.4 oz   BMI 36.87 kg/m2 36.58 kg/m2 35.22 kg/m2 36.41 kg/m2 34.42 kg/m2 34.73 kg/m2 34.59 kg/m2           Medication reconciliation up to date and corrected with patient at this time.

## 2019-01-31 NOTE — PATIENT INSTRUCTIONS
1) Refill on Adderall 20mg XR - 3 month prescription     2) BP today = 160/105. Stop drinking sports drinks - they have sodium and potassium that will raise your BP. No salt diet. Exercise daily - at least 30 minutes a day, with pulse above 100 = cardiovascular exercise  MyChart your BP readings over the next 1-2 weeks. If they are above 140/90, will need to add medication. Weight loss and daily exercise will decrease BP. Please check your blood pressure through the week at staggered times in the day. (If you check in the morning, it should be at least one hour after your morning blood pressure medications.)      Arm monitors are most accurate. If you use a wrist monitor, make sure your wrist is at heart level. You can bring your home monitor to your next visit and have it calibrated with the machine in the office to gauge your readings. Sit  with your feet uncrossed and relax for 5 minutes before taking your BP. Keep a written record of your blood pressure readings and bring it to each appointment. If your systolic (top) blood pressure is consistently greater than 140mmHg or less than 227ATSS of the diastolic (bottom) number is consistently greater than 90mmHg or less than 60mmHg then please schedule an office appointment. Work on healthy eating - no salt diet - and incorporating daily exercise into your routine. Cardiac symptoms that would need immediate attention include: uncomfortable pressure, squeezing, fullness or pain in the center of your chest. Pain or discomfort in one or both arms, the back, neck, jaw or stomach. Shortness of breath with or without chest discomfort, breaking out in a cold sweat, nausea or lightheadedness. 3) Healthy Weight  Body Mass Index is a noninvasive way to screen for weight and body fat. This is a mathematical calculation based on your height and weight. A healthy BMI is between 20% -24.9%. Your BMI is 37 %.   There is a relationship between high BMI and various healthy problems, such as osteoarthritis, muscle pain, increased risk of cancer, diabetes, heart disease, stroke, hypertension, high cholesterol, sleep apnea, breathing problems, depression, which is why a healthy BMI is so important. In terms of weight loss, a 5-10% reduction in body weight over 3-6 months is a reasonable goal.  There would likely be improvements in risk for disease such as diabetes and heart disease, with this amount of weight loss. In order to lose weight, try reducing your daily intake of calories by decreasing portion size of food and increase exercise to help reduce weight. Eat 3-5 small meals per day instead of 3 large meals. Choose lean meats for protein source which include chicken, pork, and turkey. The recommended serving size for protein is a 2-3 oz serving (the size of your fist), and 1-1.5 oz of carbohydrate per meal (about 1 cup). Increase servings of fruits and vegetables. Limit processed carbohydrates, (i.e. most breads, crackers, pasta, chips, rice, breaded or battered food, etc). If you choose to eat carbohydrates, whole wheat, (instead of white) is a healthier option for bread, rice, and crackers. Avoid fried foods. Limit sugar. Do your best to avoid all sweetened beverages, such as alcohol, juice, sodas or sweet teas, drink water, unsweet tea, diet soda, or crystal light as options instead. (Don't drink more than 2 of the 12oz artificially sweetened drinks per day as these can increase hunger and make it more difficult to lose weight. The daily goal for water intake should be at least 64 oz/day for most people. Fair Life milk is a healthy choice in milk products. It is double filtered to remove much of the lactose (sugar found in milk) and recommended by trainers and nutritionists. There is 13 g of protein in a serving, so it is an easy way to increase your protein and calcium intake.      Daily exercise will also help with weight loss and overall health. A minimum of 150 minutes a week of moderate exercise is recommended (30 minutes per day). Make exercise a routine part of your day - for example, park in spaces far away from [facility], take stairs instead of elevator, if sitting for long periods, get up from chair and walk every hour. Recruit a friend or relative to exercise with you and keep you on schedule. It is much easier to exercise with a sean who will make sure you work out each day! Home DVDs can be a great way to work out, if you will do them (otherwise, they aren't worth the money!) [employer] is a eight week mixed martial arts (MMA) based workout. It has 5 main workout DVDs, each one is 45 minutes consisting of a 10 minute warm-up, five 5 minute workouts and a 6 minute stretching/cool down. It is easy to follow, with options to modify each move and you only need hand weights and some space to do the work out. Meal planning smart phone applications like [employer] can help plan healthy meals. Get 7-8 hours of sleep each night. For reliable dietary information, go to www. EATRIGHT.org.    You may wish to consider seeing the nutritionist at [facility] ([phone]), [facility] ([phone]) or [place] ([phone]). Free smart phone application to help manage weight loss: MyFitnessPal = tracks food intake, exercise and weight. Daily nutritional summary. Meal           DASH Diet: Care Instructions  Your Care Instructions    The DASH diet is an eating plan that can help lower your blood pressure. DASH stands for Dietary Approaches to Stop Hypertension. Hypertension is high blood pressure. The DASH diet focuses on eating foods that are high in calcium, potassium, and magnesium. These nutrients can lower blood pressure. The foods that are highest in these nutrients are fruits, vegetables, low-fat dairy products, nuts, seeds, and legumes.  But taking calcium, potassium, and magnesium supplements instead of eating foods that are high in those nutrients does not have the same effect. The DASH diet also includes whole grains, fish, and poultry. The DASH diet is one of several lifestyle changes your doctor may recommend to lower your high blood pressure. Your doctor may also want you to decrease the amount of sodium in your diet. Lowering sodium while following the DASH diet can lower blood pressure even further than just the DASH diet alone. Follow-up care is a key part of your treatment and safety. Be sure to make and go to all appointments, and call your doctor if you are having problems. It's also a good idea to know your test results and keep a list of the medicines you take. How can you care for yourself at home? Following the DASH diet  · Eat 4 to 5 servings of fruit each day. A serving is 1 medium-sized piece of fruit, ½ cup chopped or canned fruit, 1/4 cup dried fruit, or 4 ounces (½ cup) of fruit juice. Choose fruit more often than fruit juice. · Eat 4 to 5 servings of vegetables each day. A serving is 1 cup of lettuce or raw leafy vegetables, ½ cup of chopped or cooked vegetables, or 4 ounces (½ cup) of vegetable juice. Choose vegetables more often than vegetable juice. · Get 2 to 3 servings of low-fat and fat-free dairy each day. A serving is 8 ounces of milk, 1 cup of yogurt, or 1 ½ ounces of cheese. · Eat 6 to 8 servings of grains each day. A serving is 1 slice of bread, 1 ounce of dry cereal, or ½ cup of cooked rice, pasta, or cooked cereal. Try to choose whole-grain products as much as possible. · Limit lean meat, poultry, and fish to 2 servings each day. A serving is 3 ounces, about the size of a deck of cards. · Eat 4 to 5 servings of nuts, seeds, and legumes (cooked dried beans, lentils, and split peas) each week. A serving is 1/3 cup of nuts, 2 tablespoons of seeds, or ½ cup of cooked beans or peas. · Limit fats and oils to 2 to 3 servings each day.  A serving is 1 teaspoon of vegetable oil or 2 tablespoons of salad dressing. · Limit sweets and added sugars to 5 servings or less a week. A serving is 1 tablespoon jelly or jam, ½ cup sorbet, or 1 cup of lemonade. · Eat less than 2,300 milligrams (mg) of sodium a day. If you limit your sodium to 1,500 mg a day, you can lower your blood pressure even more. Tips for success  · Start small. Do not try to make dramatic changes to your diet all at once. You might feel that you are missing out on your favorite foods and then be more likely to not follow the plan. Make small changes, and stick with them. Once those changes become habit, add a few more changes. · Try some of the following:  ? Make it a goal to eat a fruit or vegetable at every meal and at snacks. This will make it easy to get the recommended amount of fruits and vegetables each day. ? Try yogurt topped with fruit and nuts for a snack or healthy dessert. ? Add lettuce, tomato, cucumber, and onion to sandwiches. ? Combine a ready-made pizza crust with low-fat mozzarella cheese and lots of vegetable toppings. Try using tomatoes, squash, spinach, broccoli, carrots, cauliflower, and onions. ? Have a variety of cut-up vegetables with a low-fat dip as an appetizer instead of chips and dip. ? Sprinkle sunflower seeds or chopped almonds over salads. Or try adding chopped walnuts or almonds to cooked vegetables. ? Try some vegetarian meals using beans and peas. Add garbanzo or kidney beans to salads. Make burritos and tacos with mashed pierson beans or black beans. Where can you learn more? Go to http://yany-seble.info/. Enter Y310 in the search box to learn more about \"DASH Diet: Care Instructions. \"  Current as of: July 22, 2018  Content Version: 11.9  © 9320-5752 [employer]. Care instructions adapted under license by [employer] (which disclaims liability or warranty for this information).  If you have questions about a medical condition or this instruction, always ask your healthcare professional. [place] [address] any warranty or liability for your use of this information.

## 2019-01-31 NOTE — PROGRESS NOTES
S: Jonathan Cottrell is a 39 y.o. male who presents for ADD med check. Assessment/Plan:  1. Essential hypertension  -current therapy: losartan 100mg/HCTZ 100/25mg   -advised to monitor BP at home and keep log; pt to MyChart BP readings in over next 1-2 weeks; if BP >140/90, then another med will be added    2. Attention deficit hyperactivity disorder (ADHD), unspecified ADHD type  -current therapy: adderall 20mg XR  -attention well controlled  -discussed if HTN stays elevated, will have to reduce/stop ADD meds until can get BP controlled     RTC 2-4 weeks for HTN/med, 3 months for ADD      HPI:  Current therapy: Adderall 20mg XR  Palpitation: no  Appetite: ok - trying to lose weight   Attention: good     /105, 161/95  Patient weighs 289lbs which is a gain of 15 lbs since June 2018  BP at [facility] was ok - no alerts noted   No LE swelling  No SOB, occ HA,   no fatigue    PHQ over the last two weeks 1/10/2019   Little interest or pleasure in doing things Not at all   Feeling down, depressed, irritable, or hopeless Not at all   Total Score PHQ 2 0       Controlled Substance Agreement signed:   : Reviewed patient's prescription monitoring report at time of visit and there are no discrepancies.      Social History:  Nutrition: working on better - cutting out salt;   Drinking [place] - advised to stop all sports drinks   Physical: at work   Social: lives with 2 dogs - sometimes walks dogs   Occupation: [employer]    Social History     Tobacco Use   Smoking Status Never Smoker   Smokeless Tobacco Never Used     Social History     Substance and Sexual Activity   Alcohol Use Yes    Comment: ; weekends     Social History     Substance and Sexual Activity   Drug Use No       Review of Systems:  - Cardiovascular:  No palpitations or chest pain  - Respiratory: no cough or shortness of breath  - Gastrointestinal: no dysphagia or abdominal pain     I reviewed the following:  Past Medical History:   Diagnosis Date    Advance directive discussed with patient 10/22/2015    IBS (irritable bowel syndrome)     Knee pain, acute 10/02/14     prepatellar bursitis Aiden Watson 10/09/14    Ruptured tendon 2/2014    left distal biceps tendon (WC injury)       Current Outpatient Medications   Medication Sig Dispense Refill    amphetamine-dextroamphetamine XR (ADDERALL XR) 20 mg XR capsule Take 1 Cap (20 mg total) by mouth every morning. Max Daily Amount: 20 mg 30 Cap 0    losartan-hydroCHLOROthiazide (HYZAAR) 100-25 mg per tablet Take 1 Tab by mouth daily. 30 Tab 0    allopurinol (ZYLOPRIM) 100 mg tablet Take 100 mg by mouth daily. No Known Allergies     O: VS:   Visit Vitals  BP (!) 161/95 (BP 1 Location: Left arm, BP Patient Position: Sitting)   Pulse 76   Temp 96 °F (35.6 °C) (Oral)   Resp 18   Ht 6' 2\" (1.88 m)   Wt 289 lb 12.8 oz (131.5 kg)   SpO2 98%   BMI 37.21 kg/m²       GENERAL: [facility] is in no acute distress. Non-toxic. Well nourished. Well developed. Appropriately groomed. Pt is friendly, social and cooperative. RESP: Breath sounds are symmetrical bilaterally. Unlabored without SOB. Speaking in full sentences. Clear to auscultation bilaterally anteriorly and posteriorly. No wheezes. No rales or rhonchi. CV: normal rate. Regular rhythm. S1, S2 audible. No murmur noted. No rubs, clicks or gallops noted. NEURO:  awake, alert and oriented to person, place, and time and event. Clear speech. Muscle strength is +5/5 x 4 extremities. Sensation is intact to light touch bilaterally. Steady gait. PSYCH: appropriate behavior, dress and thought processes. Good eye contact. Clear and coherent speech. Full affect. Good insight.    ______________________________________________________________________  Patient education was done. Advised on nutrition, physical activity, weight management. Patient was given an after visit summary which included current diagnoses, medications and vital signs.     Follow up in 3 months for ADD check and medication refill.   Follow up in 2 weeks for HTN check

## 2019-02-06 DIAGNOSIS — E79.0 HYPERURICEMIA: ICD-10-CM

## 2019-02-06 RX ORDER — ALLOPURINOL 100 MG/1
TABLET ORAL
Qty: 30 TAB | Refills: 0 | OUTPATIENT
Start: 2019-02-06

## 2019-02-06 NOTE — TELEPHONE ENCOUNTER
PCP: Afsaneh Reza MD    Last appt: 1/31/2019  Future Appointments   Date Time Provider Diogenes Knapp   2/28/2019 11:00 AM Shaina Tomas NP [facility]       Requested Prescriptions     Pending Prescriptions Disp Refills    allopurinol (ZYLOPRIM) 100 mg tablet [Pharmacy Med Name: ALLOPURINOL 100MG TAB] 30 Tab 0     Sig: TAKE 1 TABLET BY MOUTH ONCE DAILY       Prior labs and Blood pressures:  BP Readings from Last 3 Encounters:   01/31/19 (!) 161/95   01/10/19 128/84   11/29/18 (!) 146/104     Lab Results   Component Value Date/Time    Sodium 140 09/05/2017 10:20 AM    Potassium 4.7 09/05/2017 10:20 AM    Chloride 100 09/05/2017 10:20 AM    CO2 24 09/05/2017 10:20 AM    Anion gap 10 09/20/2014 04:15 PM    Glucose 103 (H) 09/05/2017 10:20 AM    BUN 17 09/05/2017 10:20 AM    Creatinine 0.94 09/05/2017 10:20 AM    BUN/Creatinine ratio 18 09/05/2017 10:20 AM    GFR est  09/05/2017 10:20 AM    GFR est non- 09/05/2017 10:20 AM    Calcium 9.7 09/05/2017 10:20 AM     No results found for: HBA1C, HGBE8, LUD5MVWN, SZF5LTDD  Lab Results   Component Value Date/Time    Cholesterol, total 173 09/05/2017 10:20 AM    HDL Cholesterol 24 (L) 09/05/2017 10:20 AM    LDL, calculated 102 (H) 09/05/2017 10:20 AM    VLDL, calculated 47 (H) 09/05/2017 10:20 AM    Triglyceride 235 (H) 09/05/2017 10:20 AM     Lab Results   Component Value Date/Time    VITAMIN D, 25-HYDROXY 28.1 (L) 09/05/2017 10:20 AM       Lab Results   Component Value Date/Time    TSH 0.567 09/05/2017 10:20 AM

## 2019-03-12 ENCOUNTER — OFFICE VISIT (OUTPATIENT)
Dept: FAMILY MEDICINE CLINIC | Age: 44
End: 2019-03-12

## 2019-03-12 VITALS
RESPIRATION RATE: 16 BRPM | TEMPERATURE: 98.5 F | HEART RATE: 62 BPM | SYSTOLIC BLOOD PRESSURE: 152 MMHG | BODY MASS INDEX: 37.09 KG/M2 | HEIGHT: 74 IN | DIASTOLIC BLOOD PRESSURE: 99 MMHG | OXYGEN SATURATION: 98 % | WEIGHT: 289 LBS

## 2019-03-12 DIAGNOSIS — I10 ESSENTIAL HYPERTENSION: ICD-10-CM

## 2019-03-12 RX ORDER — LOSARTAN POTASSIUM AND HYDROCHLOROTHIAZIDE 25; 100 MG/1; MG/1
TABLET ORAL
Qty: 90 TAB | Refills: 1 | Status: SHIPPED | OUTPATIENT
Start: 2019-03-12 | End: 2019-03-25

## 2019-03-12 NOTE — PROGRESS NOTES
S: Nicholas Jack is a 39 y.o. WHITE OR  male who presents for HTN/blood pressure follow up. Assessment/Plan:    1. Essential hypertension  -current therapy: losartan/HCTZ 100/25mg - pt was not taking med - states pharmacy didn't have rx (sent electronically 2/6/19)  -discussed working on diet/exercise   -advised to monitor BP at home and keep log and send BP readings in via MyChart   -discussed if BP still above goal, will add another med    RTC 2-3 weeks for HTN /med check     Nicholas Jack has maintained 289lbs since 1/2019  Current therapy: losartan/HCTZ 100/25mg  Not taking - didn't have rx at pharmacy (advised sent rx in 2/6/19)  BP today: 164/103  Checking BP at home 160-170's  No chest pain or discomfort, elevated heart rate,  palpitations or edema in extremities  No SOB  No Fatigue  No Dizziness    Review of Systems:  - Constitutional symptoms: no fevers/chills  - Eyes: no blurry vision or double vision   - Respiratory: no SOB, difficulty or pain with breathing, wheezes, or cough. - Gastrointestinal: no dysphagia or abdominal pain  ROS is negative otherwise.     Social History:  Nutrition: working on better diet   Physical: at work   Social: lives with 2 dogs - sometimes walks dogs   Occupation: [employer]    Social History     Tobacco Use   Smoking Status Never Smoker   Smokeless Tobacco Never Used     Social History     Substance and Sexual Activity   Alcohol Use Yes    Comment: ; weekends     Social History     Substance and Sexual Activity   Drug Use No       3 most recent PHQ Screens 3/12/2019   Little interest or pleasure in doing things Not at all   Feeling down, depressed, irritable, or hopeless Not at all   Total Score PHQ 2 0       I reviewed the following:  Past Medical History:   Diagnosis Date    Advance directive discussed with patient 10/22/2015    IBS (irritable bowel syndrome)     Knee pain, acute 10/02/14     prepatellar bursitis Braxton Koehler 10/09/14    Ruptured tendon 2/2014    left distal biceps tendon (WC injury)       Current Outpatient Medications   Medication Sig Dispense Refill    [START ON 3/26/2019] amphetamine-dextroamphetamine XR (ADDERALL XR) 20 mg XR capsule Take 1 Cap (20 mg total) by mouth every morningEarliest Fill Date: 3/26/19. Max Daily Amount: 20 mg 30 Cap 0    amphetamine-dextroamphetamine XR (ADDERALL XR) 20 mg XR capsule Take 1 Cap (20 mg total) by mouth every morningEarliest Fill Date: 2/24/19. Max Daily Amount: 20 mg 30 Cap 0    amphetamine-dextroamphetamine XR (ADDERALL XR) 20 mg XR capsule Take 1 Cap (20 mg total) by mouth every morning. Max Daily Amount: 20 mg 30 Cap 0    losartan-hydroCHLOROthiazide (HYZAAR) 100-25 mg per tablet TAKE 1 TABLET BY MOUTH ONCE DAILY 90 Tab 0    amLODIPine (NORVASC) 5 mg tablet       allopurinol (ZYLOPRIM) 100 mg tablet Take 100 mg by mouth daily. No Known Allergies    O: VS:   Visit Vitals  BP (!) 152/99 (BP 1 Location: Right arm, BP Patient Position: Sitting) Comment: repeat   Pulse 62   Temp 98.5 °F (36.9 °C) (Oral)   Resp 16   Ht 6' 2\" (1.88 m)   Wt 289 lb (131.1 kg)   SpO2 98%   BMI 37.11 kg/m²     Data Reviewed:   · Lipids:  Lab Results   Component Value Date/Time    Cholesterol, total 173 09/05/2017 10:20 AM    HDL Cholesterol 24 (L) 09/05/2017 10:20 AM    LDL, calculated 102 (H) 09/05/2017 10:20 AM    VLDL, calculated 47 (H) 09/05/2017 10:20 AM    Triglyceride 235 (H) 09/05/2017 10:20 AM       GENERAL: Jimmy Harrell is in no acute distress. EYE: PERRLA. EOMs intact. Sclera anicteric without injection. RESP: Unlabored without SOB. Speaking in full sentences. Breath sounds are symmetrical bilaterally. Clear to auscultation to all fields. No wheezes. No rales or rhonchi. CV: Normal rate. Regular rhythm. S1, S2 audible. No murmur noted. No rubs, clicks or gallops noted. NEURO:  awake, alert and oriented to person, place, and time and event. Clear speech. Steady gait.    HEME/LYMPH: Pedal pulses palpable 2+ x 4 extremities. No peripheral edema is noted. SKIN: Skin is warm and dry. Turgor is normal. No petechiae, no purpura, no rash. No cyanosis. No mottling, jaundice or pallor. ____________________________________________________________________  Reviewed medications and side effects. Reviewed warning signs of hypertension, stroke and heart attack. Advised on nutrition, physical activity, weight management, tobacco, and alcohol.     Patient verbalized understanding and agreed to plan of care. Follow up in 3- weeks as directed.

## 2019-03-12 NOTE — PROGRESS NOTES
Identified pt with two pt identifiers(name and ). Reviewed record in preparation for visit and have obtained necessary documentation. Chief Complaint   Patient presents with    Hypertension     1 month follow up        There are no preventive care reminders to display for this patient. Coordination of Care Questionnaire:  :   1) Have you been to an emergency room, urgent care, or hospitalized since your last visit? If yes, where when, and reason for visit? no       2. Have seen or consulted any other health care provider since your last visit? If yes, where when, and reason for visit? NO      3) Do you have an Advanced Directive/ Living Will in place? NO  If yes, do we have a copy on file NO  If no, would you like information NO    Patient is accompanied by self I have received verbal consent from Jumana Damon to discuss any/all medical information while they are present in the room.     Visit Vitals  BP (!) 152/99 (BP 1 Location: Right arm, BP Patient Position: Sitting)   Pulse 62   Temp 98.5 °F (36.9 °C) (Oral)   Resp 16   Ht 6' 2\" (1.88 m)   Wt 289 lb (131.1 kg)   SpO2 98%   BMI 37.11 kg/m²     Aroldo Veliz LPN

## 2019-03-12 NOTE — PATIENT INSTRUCTIONS
1) If pharmacy doesn't have the refill on losartan/HCTZ 100/25mg medication, please have pharmacy call our office. It was sent electronically 2/6/19 and 3/12/19. Current therapy: losartan/HCTZ 100/25mg daily - may have to add medication if BP is above 130/90 goal    Please check your blood pressure through the week at staggered times in the day. (If you check in the morning, it should be at least one hour after your morning blood pressure medications.)      Arm monitors are most accurate. If you use a wrist monitor, make sure your wrist is at heart level. You can bring your home monitor to your next visit and have it calibrated with the machine in the office to gauge your readings. Sit  with your feet uncrossed and relax for 5 minutes before taking your BP. Keep a written record of your blood pressure readings and bring it to each appointment. If your systolic (top) blood pressure is consistently greater than 130mmHg or less than [id number] of the diastolic (bottom) number is consistently greater than 90mmHg or less than 60mmHg then please schedule an office appointment. Work on healthy eating - no salt diet - and incorporating daily exercise into your routine. Cardiac symptoms that would need immediate attention include: uncomfortable pressure, squeezing, fullness or pain in the center of your chest. Pain or discomfort in one or both arms, the back, neck, jaw or stomach. Shortness of breath with or without chest discomfort, breaking out in a cold sweat, nausea or lightheadedness. DASH Diet: Care Instructions  Your Care Instructions    The DASH diet is an eating plan that can help lower your blood pressure. DASH stands for Dietary Approaches to Stop Hypertension. Hypertension is high blood pressure. The DASH diet focuses on eating foods that are high in calcium, potassium, and magnesium. These nutrients can lower blood pressure.  The foods that are highest in these nutrients are fruits, vegetables, low-fat dairy products, nuts, seeds, and legumes. But taking calcium, potassium, and magnesium supplements instead of eating foods that are high in those nutrients does not have the same effect. The DASH diet also includes whole grains, fish, and poultry. The DASH diet is one of several lifestyle changes your doctor may recommend to lower your high blood pressure. Your doctor may also want you to decrease the amount of sodium in your diet. Lowering sodium while following the DASH diet can lower blood pressure even further than just the DASH diet alone. Follow-up care is a key part of your treatment and safety. Be sure to make and go to all appointments, and call your doctor if you are having problems. It's also a good idea to know your test results and keep a list of the medicines you take. How can you care for yourself at home? Following the DASH diet  · Eat 4 to 5 servings of fruit each day. A serving is 1 medium-sized piece of fruit, ½ cup chopped or canned fruit, 1/4 cup dried fruit, or 4 ounces (½ cup) of fruit juice. Choose fruit more often than fruit juice. · Eat 4 to 5 servings of vegetables each day. A serving is 1 cup of lettuce or raw leafy vegetables, ½ cup of chopped or cooked vegetables, or 4 ounces (½ cup) of vegetable juice. Choose vegetables more often than vegetable juice. · Get 2 to 3 servings of low-fat and fat-free dairy each day. A serving is 8 ounces of milk, 1 cup of yogurt, or 1 ½ ounces of cheese. · Eat 6 to 8 servings of grains each day. A serving is 1 slice of bread, 1 ounce of dry cereal, or ½ cup of cooked rice, pasta, or cooked cereal. Try to choose whole-grain products as much as possible. · Limit lean meat, poultry, and fish to 2 servings each day. A serving is 3 ounces, about the size of a deck of cards. · Eat 4 to 5 servings of nuts, seeds, and legumes (cooked dried beans, lentils, and split peas) each week.  A serving is 1/3 cup of nuts, 2 tablespoons of seeds, or ½ cup of cooked beans or peas. · Limit fats and oils to 2 to 3 servings each day. A serving is 1 teaspoon of vegetable oil or 2 tablespoons of salad dressing. · Limit sweets and added sugars to 5 servings or less a week. A serving is 1 tablespoon jelly or jam, ½ cup sorbet, or 1 cup of lemonade. · Eat less than 2,300 milligrams (mg) of sodium a day. If you limit your sodium to 1,500 mg a day, you can lower your blood pressure even more. Tips for success  · Start small. Do not try to make dramatic changes to your diet all at once. You might feel that you are missing out on your favorite foods and then be more likely to not follow the plan. Make small changes, and stick with them. Once those changes become habit, add a few more changes. · Try some of the following:  ? Make it a goal to eat a fruit or vegetable at every meal and at snacks. This will make it easy to get the recommended amount of fruits and vegetables each day. ? Try yogurt topped with fruit and nuts for a snack or healthy dessert. ? Add lettuce, tomato, cucumber, and onion to sandwiches. ? Combine a ready-made pizza crust with low-fat mozzarella cheese and lots of vegetable toppings. Try using tomatoes, squash, spinach, broccoli, carrots, cauliflower, and onions. ? Have a variety of cut-up vegetables with a low-fat dip as an appetizer instead of chips and dip. ? Sprinkle sunflower seeds or chopped almonds over salads. Or try adding chopped walnuts or almonds to cooked vegetables. ? Try some vegetarian meals using beans and peas. Add garbanzo or kidney beans to salads. Make burritos and tacos with mashed pierson beans or black beans. Where can you learn more? Go to http://yany-seble.info/. Enter S376 in the search box to learn more about \"DASH Diet: Care Instructions. \"  Current as of: July 22, 2018  Content Version: 11.9  © 2084-3315 [employer], [employer].  Care instructions adapted under license by [employer] (which disclaims liability or warranty for this information). If you have questions about a medical condition or this instruction, always ask your healthcare professional. [address] any warranty or liability for your use of this information.

## 2019-03-25 ENCOUNTER — PATIENT MESSAGE (OUTPATIENT)
Dept: FAMILY MEDICINE CLINIC | Age: 44
End: 2019-03-25

## 2019-03-25 DIAGNOSIS — I10 ESSENTIAL HYPERTENSION: Primary | ICD-10-CM

## 2019-03-25 RX ORDER — TELMISARTAN AND HYDROCHLORTHIAZIDE 80; 25 MG/1; MG/1
1 TABLET ORAL DAILY
Qty: 30 TAB | Refills: 0 | Status: SHIPPED | OUTPATIENT
Start: 2019-03-25 | End: 2019-04-04

## 2019-04-04 RX ORDER — TELMISARTAN 80 MG/1
80 TABLET ORAL DAILY
Qty: 30 TAB | Refills: 1 | Status: SHIPPED | OUTPATIENT
Start: 2019-04-04 | End: 2019-04-26 | Stop reason: SDUPTHER

## 2019-04-04 RX ORDER — HYDROCHLOROTHIAZIDE 25 MG/1
25 TABLET ORAL DAILY
Qty: 30 TAB | Refills: 1 | Status: SHIPPED | OUTPATIENT
Start: 2019-04-04 | End: 2019-04-26 | Stop reason: SDUPTHER

## 2019-04-04 NOTE — TELEPHONE ENCOUNTER
Dawson Florentino 4/3/2019 5:06 PM EDT        ----- Message -----  From: Jesusita Jaimes LPN  Sent: 5/09/4939 4:40 PM  To: Ken Wang  Subject: FW: RE: < No Subject>         ----- Message -----  From: Gonzalez Garcia  Sent: 3/29/2019 3:47 PM  To: [facility] Nurse [place]  Subject: RE: RE: < No Subject>     ----- Message from [address] [place] [address] 951, Generic sent at 3/29/2019 3:47 PM EDT -----    i have sent other messages maybe I hit the wrong button I went to get new meds it cost $108.00 I can't afford that [facility] also said they sent message to the office   ----- Message -----  From: Maya Olea  Sent: 3/25/19, 12:36 PM  To: Gonzalez Garcia  Subject: RE: < No Subject>    You're very welcome!    ----- Message -----   From: Gonzalez Garcia   Sent: 3/25/2019 12:00 PM EDT    To: Ken Sean Kathleen  Subject: RE: < No Subject>    ok thank you    ----- Message -----  From: Maya Olea  Sent: 3/25/19, 9:42 AM  To: Gonzalez Garcia  Subject:     Good morning Mr. Shannon Arceo,     Dr. Luis Cosby sent in a new medication for your BP to replace the Losartan-HCTZ. He will need you to come in about a month to recheck your BP on the new medication to make sure it is working.      Thank you  Maya Olea LPN

## 2019-04-25 DIAGNOSIS — F90.9 ATTENTION DEFICIT HYPERACTIVITY DISORDER (ADHD), UNSPECIFIED ADHD TYPE: ICD-10-CM

## 2019-04-26 RX ORDER — DEXTROAMPHETAMINE SACCHARATE, AMPHETAMINE ASPARTATE MONOHYDRATE, DEXTROAMPHETAMINE SULFATE AND AMPHETAMINE SULFATE 5; 5; 5; 5 MG/1; MG/1; MG/1; MG/1
20 CAPSULE, EXTENDED RELEASE ORAL
Qty: 30 CAP | Refills: 0 | OUTPATIENT
Start: 2019-04-26

## 2019-04-26 NOTE — TELEPHONE ENCOUNTER
Writer called patient, message left to return call to office. (Ref: Refill for Adderall, OV needed). Patient returned call, 2 identifiers received, explained to patient he will need an OV to refill his adderall. Patient also stated he needs his B/P medications refilled and has sent that request by [employer] to provider. Pt will call office to schedule appointment.

## 2019-04-30 NOTE — TELEPHONE ENCOUNTER
Writer called patient, 2 identifiers received, patient reminded request for refill of Adderal an appointment if required, pt stated he was planning to call today to schedule.

## 2019-05-08 ENCOUNTER — OFFICE VISIT (OUTPATIENT)
Dept: FAMILY MEDICINE CLINIC | Age: 44
End: 2019-05-08

## 2019-05-08 VITALS
OXYGEN SATURATION: 98 % | WEIGHT: 287.3 LBS | BODY MASS INDEX: 36.87 KG/M2 | HEART RATE: 102 BPM | DIASTOLIC BLOOD PRESSURE: 95 MMHG | TEMPERATURE: 96 F | SYSTOLIC BLOOD PRESSURE: 155 MMHG | HEIGHT: 74 IN | RESPIRATION RATE: 16 BRPM

## 2019-05-08 DIAGNOSIS — F90.9 ATTENTION DEFICIT HYPERACTIVITY DISORDER (ADHD), UNSPECIFIED ADHD TYPE: ICD-10-CM

## 2019-05-08 DIAGNOSIS — I10 ESSENTIAL HYPERTENSION: Primary | ICD-10-CM

## 2019-05-08 DIAGNOSIS — E66.01 SEVERE OBESITY WITH BODY MASS INDEX (BMI) OF 35.0 TO 39.9 WITH SERIOUS COMORBIDITY (HCC): ICD-10-CM

## 2019-05-08 DIAGNOSIS — R79.89 LOW VITAMIN D LEVEL: ICD-10-CM

## 2019-05-08 DIAGNOSIS — R73.09 INCREASED GLUCOSE LEVEL: ICD-10-CM

## 2019-05-08 DIAGNOSIS — E79.0 HYPERURICEMIA: ICD-10-CM

## 2019-05-08 RX ORDER — TELMISARTAN 80 MG/1
80 TABLET ORAL DAILY
Qty: 30 TAB | Refills: 0 | Status: CANCELLED | OUTPATIENT
Start: 2019-05-08

## 2019-05-08 RX ORDER — DEXTROAMPHETAMINE SACCHARATE, AMPHETAMINE ASPARTATE MONOHYDRATE, DEXTROAMPHETAMINE SULFATE AND AMPHETAMINE SULFATE 5; 5; 5; 5 MG/1; MG/1; MG/1; MG/1
20 CAPSULE, EXTENDED RELEASE ORAL
Qty: 30 CAP | Refills: 0 | Status: SHIPPED | OUTPATIENT
Start: 2019-07-08 | End: 2019-09-05

## 2019-05-08 RX ORDER — AZELASTINE 1 MG/ML
SPRAY, METERED NASAL
Refills: 1 | COMMUNITY
Start: 2019-03-25 | End: 2019-12-09

## 2019-05-08 RX ORDER — DEXTROAMPHETAMINE SACCHARATE, AMPHETAMINE ASPARTATE MONOHYDRATE, DEXTROAMPHETAMINE SULFATE AND AMPHETAMINE SULFATE 5; 5; 5; 5 MG/1; MG/1; MG/1; MG/1
20 CAPSULE, EXTENDED RELEASE ORAL
Qty: 30 CAP | Refills: 0 | Status: SHIPPED | OUTPATIENT
Start: 2019-06-08 | End: 2019-09-05

## 2019-05-08 RX ORDER — HYDROCHLOROTHIAZIDE 25 MG/1
25 TABLET ORAL DAILY
Qty: 30 TAB | Refills: 0 | Status: CANCELLED | OUTPATIENT
Start: 2019-05-08

## 2019-05-08 RX ORDER — DEXTROAMPHETAMINE SACCHARATE, AMPHETAMINE ASPARTATE MONOHYDRATE, DEXTROAMPHETAMINE SULFATE AND AMPHETAMINE SULFATE 5; 5; 5; 5 MG/1; MG/1; MG/1; MG/1
20 CAPSULE, EXTENDED RELEASE ORAL
Qty: 30 CAP | Refills: 0 | Status: SHIPPED | OUTPATIENT
Start: 2019-05-08 | End: 2019-09-05

## 2019-05-08 NOTE — PROGRESS NOTES
Yane Alvarez  Identified pt with two pt identifiers(name and ). Chief Complaint Patient presents with  Medication Refill Rm1/ nonfasting 1. Have you been to the ER, urgent care clinic since your last visit? Hospitalized since your last visit? n 
 
 
2. Have you seen or consulted any other health care providers outside of the [address] [place] [address] since your last visit? Include any pap smears or colon screening. no 
 
 
Would you like to sign up for MyChart today, if you have not already done so? yes If not, would you like information on MyChart, and how to sign up at a later time? no 
 
 
Medication reconciliation up to date and corrected with patient at this time. Today's provider has been notified of reason for visit, vitals and flowsheets obtained on patients. Reviewed record in preparation for visit, huddled with provider and have obtained necessary documentation. There are no preventive care reminders to display for this patient. Wt Readings from Last 3 Encounters:  
19 287 lb 4.8 oz (130.3 kg) 19 289 lb (131.1 kg) 19 289 lb 12.8 oz (131.5 kg) Temp Readings from Last 3 Encounters:  
19 96 °F (35.6 °C) (Oral) 19 98.5 °F (36.9 °C) (Oral) 19 96 °F (35.6 °C) (Oral) BP Readings from Last 3 Encounters:  
19 (!) 177/93  
19 (!) 152/99  
19 (!) 161/95 Pulse Readings from Last 3 Encounters:  
19 (!) 102  
19 62  
19 76 Vitals:  
 19 1307 BP: (!) 177/93 Pulse: (!) 102 Resp: 16 Temp: 96 °F (35.6 °C) TempSrc: Oral  
SpO2: 98% Weight: 287 lb 4.8 oz (130.3 kg) Height: 6' 2\" (1.88 m) PainSc:   0 - No pain Learning Assessment: 
:  
 
Learning Assessment 2019 PRIMARY LEARNER Patient Patient Patient BARRIERS PRIMARY LEARNER - NONE NONE  
CO-LEARNER CAREGIVER - No No  
PRIMARY LANGUAGE ENGLISH ENGLISH ENGLISH  
 LEARNER PREFERENCE PRIMARY DEMONSTRATION DEMONSTRATION OTHER (COMMENT) - LISTENING -  
ANSWERED BY patient Patien self RELATIONSHIP SELF SELF -  
 
 
Depression Screening: 
:  
 
3 most recent PHQ Screens 5/8/2019 Little interest or pleasure in doing things Not at all Feeling down, depressed, irritable, or hopeless Not at all Total Score PHQ 2 0 Fall Risk Assessment: 
:  
 
Fall Risk Assessment, last 12 mths 5/8/2019 Able to walk? Yes Fall in past 12 months? No  
 
 
Abuse Screening: 
:  
 
Abuse Screening Questionnaire 5/8/2019 1/31/2019 11/29/2018 6/1/2018 Do you ever feel afraid of your partner? N N N N Are you in a relationship with someone who physically or mentally threatens you? N N N N Is it safe for you to go home? Y Y Y Y  
 
 
ADL Screening: 
:  
 
No flowsheet data found.

## 2019-05-08 NOTE — PROGRESS NOTES
Off BP meds last 2 days. Out of Adderall past 3-4 days. Here for controlled med refill.  reviewed. Didn't know BP med was at the pharmacy. Advised to get and return for BP check on meds. Patient denies chest pain, dyspnea, unexpected weight change, unexpected pain, mood or memory changes. Visit Vitals BP (!) 155/95 (BP 1 Location: Left arm, BP Patient Position: Sitting) Pulse (!) 102 Temp 96 °F (35.6 °C) (Oral) Resp 16 Ht 6' 2\" (1.88 m) Wt 287 lb 4.8 oz (130.3 kg) SpO2 98% BMI 36.89 kg/m² Patient alert and cooperative. Reviewed above. Assessment: 1. HTN, high readings off meds. Plan: 1. Return in two weeks on meds to check BP. 2. Get overdue annual labs today. 3. Given three months postdated scripts for Adderall. Return in three months for controlled med refill. 4. Follow otherwise here prn.

## 2019-05-09 DIAGNOSIS — R79.89 LOW VITAMIN D LEVEL: Primary | ICD-10-CM

## 2019-05-09 LAB
25(OH)D3+25(OH)D2 SERPL-MCNC: 19.6 NG/ML (ref 30–100)
ALBUMIN SERPL-MCNC: 4.2 G/DL (ref 3.5–5.5)
ALBUMIN/GLOB SERPL: 1.6 {RATIO} (ref 1.2–2.2)
ALP SERPL-CCNC: 78 IU/L (ref 39–117)
ALT SERPL-CCNC: 54 IU/L (ref 0–44)
APPEARANCE UR: CLEAR
AST SERPL-CCNC: 37 IU/L (ref 0–40)
BASOPHILS # BLD AUTO: 0 X10E3/UL (ref 0–0.2)
BASOPHILS NFR BLD AUTO: 0 %
BILIRUB SERPL-MCNC: 0.2 MG/DL (ref 0–1.2)
BILIRUB UR QL STRIP: NEGATIVE
BUN SERPL-MCNC: 20 MG/DL (ref 6–24)
BUN/CREAT SERPL: 24 (ref 9–20)
CALCIUM SERPL-MCNC: 9.1 MG/DL (ref 8.7–10.2)
CHLORIDE SERPL-SCNC: 104 MMOL/L (ref 96–106)
CHOLEST SERPL-MCNC: 196 MG/DL (ref 100–199)
CO2 SERPL-SCNC: 26 MMOL/L (ref 20–29)
COLOR UR: YELLOW
CREAT SERPL-MCNC: 0.83 MG/DL (ref 0.76–1.27)
EOSINOPHIL # BLD AUTO: 0.2 X10E3/UL (ref 0–0.4)
EOSINOPHIL NFR BLD AUTO: 3 %
ERYTHROCYTE [DISTWIDTH] IN BLOOD BY AUTOMATED COUNT: 14.9 % (ref 12.3–15.4)
GLOBULIN SER CALC-MCNC: 2.6 G/DL (ref 1.5–4.5)
GLUCOSE SERPL-MCNC: 114 MG/DL (ref 65–99)
GLUCOSE UR QL: NEGATIVE
HCT VFR BLD AUTO: 39.7 % (ref 37.5–51)
HDLC SERPL-MCNC: 25 MG/DL
HGB BLD-MCNC: 13.4 G/DL (ref 13–17.7)
HGB UR QL STRIP: NEGATIVE
IMM GRANULOCYTES # BLD AUTO: 0 X10E3/UL (ref 0–0.1)
IMM GRANULOCYTES NFR BLD AUTO: 0 %
INTERPRETATION, 910389: NORMAL
KETONES UR QL STRIP: NEGATIVE
LDLC SERPL CALC-MCNC: ABNORMAL MG/DL (ref 0–99)
LEUKOCYTE ESTERASE UR QL STRIP: NEGATIVE
LYMPHOCYTES # BLD AUTO: 1.6 X10E3/UL (ref 0.7–3.1)
LYMPHOCYTES NFR BLD AUTO: 23 %
MCH RBC QN AUTO: 28.5 PG (ref 26.6–33)
MCHC RBC AUTO-ENTMCNC: 33.8 G/DL (ref 31.5–35.7)
MCV RBC AUTO: 85 FL (ref 79–97)
MICRO URNS: NORMAL
MONOCYTES # BLD AUTO: 0.5 X10E3/UL (ref 0.1–0.9)
MONOCYTES NFR BLD AUTO: 7 %
NEUTROPHILS # BLD AUTO: 4.8 X10E3/UL (ref 1.4–7)
NEUTROPHILS NFR BLD AUTO: 67 %
NITRITE UR QL STRIP: NEGATIVE
PDF IMAGE, 910387: NORMAL
PH UR STRIP: 5.5 [PH] (ref 5–7.5)
PLATELET # BLD AUTO: 259 X10E3/UL (ref 150–379)
POTASSIUM SERPL-SCNC: 4.5 MMOL/L (ref 3.5–5.2)
PROT SERPL-MCNC: 6.8 G/DL (ref 6–8.5)
PROT UR QL STRIP: NEGATIVE
RBC # BLD AUTO: 4.7 X10E6/UL (ref 4.14–5.8)
SODIUM SERPL-SCNC: 143 MMOL/L (ref 134–144)
SP GR UR: 1.02 (ref 1–1.03)
TRIGL SERPL-MCNC: 446 MG/DL (ref 0–149)
TSH SERPL DL<=0.005 MIU/L-ACNC: 0.6 UIU/ML (ref 0.45–4.5)
URATE SERPL-MCNC: 7.8 MG/DL (ref 3.7–8.6)
UROBILINOGEN UR STRIP-MCNC: 0.2 MG/DL (ref 0.2–1)
VLDLC SERPL CALC-MCNC: ABNORMAL MG/DL (ref 5–40)
WBC # BLD AUTO: 7.2 X10E3/UL (ref 3.4–10.8)

## 2019-05-09 RX ORDER — ERGOCALCIFEROL 1.25 MG/1
50000 CAPSULE ORAL 2 TIMES WEEKLY
Qty: 24 CAP | Refills: 0 | Status: SHIPPED | OUTPATIENT
Start: 2019-05-10 | End: 2019-09-05

## 2019-05-09 NOTE — PROGRESS NOTES
Verbal Order Read Back Per Yan, Missy Sutton MD for Vitamin D 50,000 units two times a week for 3 months, # 24, PO, 0 refills on 2019 due to Vitamin D deficiency. Maggy Monteiro verified correct name and  with PCP. Verbal Order Read Back Per Yan, Missy Sutton MD for Vitamin D lab draw.  Maggy Monteiro verified correct name and  with PCP

## 2019-05-09 NOTE — PROGRESS NOTES
Low Vit. D.  Begin ergocalciferol 50 K units twice weekly and recheck 3 mos. Inc gluc. Add A1c. Mod inc TGs.   Rest O.K.

## 2019-05-17 LAB
EST. AVERAGE GLUCOSE BLD GHB EST-MCNC: 123 MG/DL
HBA1C MFR BLD: 5.9 % (ref 4.8–5.6)
SPECIMEN STATUS REPORT, ROLRST: NORMAL

## 2019-05-22 ENCOUNTER — OFFICE VISIT (OUTPATIENT)
Dept: FAMILY MEDICINE CLINIC | Age: 44
End: 2019-05-22

## 2019-05-22 VITALS
TEMPERATURE: 97.9 F | BODY MASS INDEX: 36.73 KG/M2 | HEART RATE: 79 BPM | WEIGHT: 286.2 LBS | DIASTOLIC BLOOD PRESSURE: 90 MMHG | HEIGHT: 74 IN | RESPIRATION RATE: 18 BRPM | SYSTOLIC BLOOD PRESSURE: 138 MMHG

## 2019-05-22 DIAGNOSIS — I10 ESSENTIAL HYPERTENSION: ICD-10-CM

## 2019-05-22 DIAGNOSIS — I10 ELEVATED BLOOD PRESSURE READING IN OFFICE WITH DIAGNOSIS OF HYPERTENSION: Primary | ICD-10-CM

## 2019-05-22 RX ORDER — AMLODIPINE BESYLATE 5 MG/1
5 TABLET ORAL DAILY
Qty: 30 TAB | Refills: 0 | Status: SHIPPED | OUTPATIENT
Start: 2019-05-22 | End: 2019-06-05 | Stop reason: SDUPTHER

## 2019-05-22 NOTE — PROGRESS NOTES
Here for BP f/u back on meds. Still borderline uncontrolled. Patient denies chest pain, dyspnea, unexpected weight change, unexpected pain, mood or memory changes. Visit Vitals  /90 (BP 1 Location: Left arm, BP Patient Position: Sitting)   Pulse 79   Temp 97.9 °F (36.6 °C) (Oral)   Resp 18   Ht 6' 2\" (1.88 m)   Wt 286 lb 3.2 oz (129.8 kg)   BMI 36.75 kg/m²       Patient alert and cooperative. Reviewed above. Assessment:  1. HTN, still high readings. Plan:  1. Will add Amlodipine at 5 mg. 2. Return in two weeks for recheck. 3. Follow otherwise here prn.

## 2019-05-22 NOTE — PROGRESS NOTES
Scarlet Avilez  Identified pt with two pt identifiers(name and ). Chief Complaint   Patient presents with    Hypertension     rm 2/non fasting/medication evaluation       1. Have you been to the ER, urgent care clinic since your last visit? No Hospitalized since your last visit? no    2. Have you seen or consulted any other health care providers outside of the [address] [place] [address] since your last visit? Include any pap smears or colon screening. no      Advance Care Planning    In the event something were to happen to you and you were unable to speak on your behalf, do you have an Advance Directive/ Living Will in place stating your wishes? NO    If yes, do we have a copy on file NO    If no, would you like information NO    Medication reconciliation up to date and corrected with patient at this time. Today's provider has been notified of reason for visit, vitals and flowsheets obtained on patients. Reviewed record in preparation for visit, huddled with provider and have obtained necessary documentation. There are no preventive care reminders to display for this patient. Wt Readings from Last 3 Encounters:   19 287 lb 4.8 oz (130.3 kg)   19 289 lb (131.1 kg)   19 289 lb 12.8 oz (131.5 kg)     Temp Readings from Last 3 Encounters:   19 96 °F (35.6 °C) (Oral)   19 98.5 °F (36.9 °C) (Oral)   19 96 °F (35.6 °C) (Oral)     BP Readings from Last 3 Encounters:   19 (!) 155/95   19 (!) 152/99   19 (!) 161/95     Pulse Readings from Last 3 Encounters:   19 (!) 102   19 62   19 76     There were no vitals filed for this visit.       Learning Assessment:  :     Learning Assessment 2019   PRIMARY LEARNER Patient Patient Patient   BARRIERS PRIMARY LEARNER - NONE NONE   CO-LEARNER CAREGIVER - No No   PRIMARY LANGUAGE ENGLISH ENGLISH ENGLISH   LEARNER PREFERENCE PRIMARY DEMONSTRATION DEMONSTRATION OTHER (COMMENT)     - LISTENING -   ANSWERED BY patient Patien self   RELATIONSHIP SELF SELF -       Depression Screening:  :     3 most recent PHQ Screens 5/8/2019   Little interest or pleasure in doing things Not at all   Feeling down, depressed, irritable, or hopeless Not at all   Total Score PHQ 2 0       No flowsheet data found. Fall Risk Assessment:  :     Fall Risk Assessment, last 12 mths 5/8/2019   Able to walk? Yes   Fall in past 12 months? No       Abuse Screening:  :     Abuse Screening Questionnaire 5/8/2019 1/31/2019 11/29/2018 6/1/2018   Do you ever feel afraid of your partner? N N N N   Are you in a relationship with someone who physically or mentally threatens you? N N N N   Is it safe for you to go home? Y Y Y Y       ADL Screening:  :     No flowsheet data found. BMI:  Weight Metrics 5/8/2019 3/12/2019 1/31/2019 1/10/2019 11/29/2018 6/1/2018 12/20/2017   Weight 287 lb 4.8 oz 289 lb 289 lb 12.8 oz 287 lb 3.2 oz 284 lb 14.4 oz 274 lb 4.8 oz 283 lb 9.6 oz   BMI 36.89 kg/m2 37.11 kg/m2 37.21 kg/m2 36.87 kg/m2 36.58 kg/m2 35.22 kg/m2 36.41 kg/m2           Medication reconciliation up to date and corrected with patient at this time.

## 2019-06-05 ENCOUNTER — OFFICE VISIT (OUTPATIENT)
Dept: FAMILY MEDICINE CLINIC | Age: 44
End: 2019-06-05

## 2019-06-05 VITALS
HEIGHT: 75 IN | DIASTOLIC BLOOD PRESSURE: 80 MMHG | RESPIRATION RATE: 18 BRPM | WEIGHT: 278.9 LBS | HEART RATE: 79 BPM | OXYGEN SATURATION: 97 % | SYSTOLIC BLOOD PRESSURE: 136 MMHG | TEMPERATURE: 98.5 F | BODY MASS INDEX: 34.68 KG/M2

## 2019-06-05 DIAGNOSIS — E66.9 OBESITY (BMI 35.0-39.9 WITHOUT COMORBIDITY): ICD-10-CM

## 2019-06-05 DIAGNOSIS — I10 ESSENTIAL HYPERTENSION: Primary | ICD-10-CM

## 2019-06-05 DIAGNOSIS — E66.01 SEVERE OBESITY (HCC): ICD-10-CM

## 2019-06-05 DIAGNOSIS — I10 ELEVATED BLOOD PRESSURE READING IN OFFICE WITH DIAGNOSIS OF HYPERTENSION: ICD-10-CM

## 2019-06-05 RX ORDER — AMLODIPINE BESYLATE 5 MG/1
5 TABLET ORAL DAILY
Qty: 90 TAB | Refills: 3 | Status: SHIPPED | OUTPATIENT
Start: 2019-06-05 | End: 2019-06-12 | Stop reason: SDUPTHER

## 2019-06-05 NOTE — PROGRESS NOTES
Here for BP f/u. Lst visit added Norvasc 5 mg for persistently high readings. No pbs reported with med. Patient denies chest pain, dyspnea, unexpected weight change, unexpected pain, mood or memory changes. Visit Vitals  /80 (BP 1 Location: Right arm, BP Patient Position: Sitting)   Pulse 79   Temp 98.5 °F (36.9 °C) (Oral)   Resp 18   Ht 6' 2.5\" (1.892 m)   Wt 278 lb 14.4 oz (126.5 kg)   SpO2 97%   BMI 35.33 kg/m²     Patient alert and cooperative. Reviewed above. Assessment:  1. HTN, controlled on current regimen. Plan:  1. Refilled for a year. 2. Can return in 3-6 months for [facility]. 3. Labs not due for a year. 4. Follow otherwise here prn.

## 2019-06-05 NOTE — PROGRESS NOTES
Yane Alvarez  Identified pt with two pt identifiers(name and ). Chief Complaint   Patient presents with    Blood Pressure Check       1. Have you been to the ER, urgent care clinic since your last visit? Hospitalized since your last visit? No    2. Have you seen or consulted any other health care providers outside of the [address] [place] [address] since your last visit? Include any pap smears or colon screening. No      Would you like to sign up for MyChart today, if you have not already done so? Patient has a mychart  If not, would you like information on MyChart, and how to sign up at a later time? No      Medication reconciliation up to date and corrected with patient at this time. Today's provider has been notified of reason for visit, vitals and flowsheets obtained on patients. Reviewed record in preparation for visit, huddled with provider and have obtained necessary documentation. There are no preventive care reminders to display for this patient.     Wt Readings from Last 3 Encounters:   19 278 lb 14.4 oz (126.5 kg)   19 286 lb 3.2 oz (129.8 kg)   19 287 lb 4.8 oz (130.3 kg)     Temp Readings from Last 3 Encounters:   19 98.5 °F (36.9 °C) (Oral)   19 97.9 °F (36.6 °C) (Oral)   19 96 °F (35.6 °C) (Oral)     BP Readings from Last 3 Encounters:   19 (!) 135/92   19 138/90   19 (!) 155/95     Pulse Readings from Last 3 Encounters:   19 79   19 79   19 (!) 102     Vitals:    19 1502   BP: (!) 135/92   Pulse: 79   Resp: 18   Temp: 98.5 °F (36.9 °C)   TempSrc: Oral   SpO2: 97%   Weight: 278 lb 14.4 oz (126.5 kg)   Height: 6' 2.5\" (1.892 m)   PainSc:   0 - No pain         Learning Assessment:  :     Learning Assessment 2019   PRIMARY LEARNER Patient Patient Patient   BARRIERS PRIMARY LEARNER - NONE NONE   CO-LEARNER CAREGIVER - No No   PRIMARY LANGUAGE ENGLISH ENGLISH ENGLISH   LEARNER PREFERENCE PRIMARY DEMONSTRATION DEMONSTRATION OTHER (COMMENT)     - LISTENING -   ANSWERED BY patient Patien self   RELATIONSHIP SELF SELF -       Depression Screening:  :     3 most recent PHQ Screens 5/22/2019   Little interest or pleasure in doing things Not at all   Feeling down, depressed, irritable, or hopeless Not at all   Total Score PHQ 2 0       Fall Risk Assessment:  :     Fall Risk Assessment, last 12 mths 5/8/2019   Able to walk? Yes   Fall in past 12 months? No       Abuse Screening:  :     Abuse Screening Questionnaire 5/22/2019 5/8/2019 1/31/2019 11/29/2018 6/1/2018   Do you ever feel afraid of your partner? N N N N N   Are you in a relationship with someone who physically or mentally threatens you? N N N N N   Is it safe for you to go home? Y Y Y Y Y       ADL Screening:  :     No flowsheet data found.

## 2019-09-05 ENCOUNTER — OFFICE VISIT (OUTPATIENT)
Dept: FAMILY MEDICINE CLINIC | Age: 44
End: 2019-09-05

## 2019-09-05 VITALS
RESPIRATION RATE: 18 BRPM | SYSTOLIC BLOOD PRESSURE: 153 MMHG | TEMPERATURE: 98.4 F | HEIGHT: 75 IN | OXYGEN SATURATION: 97 % | BODY MASS INDEX: 35.68 KG/M2 | DIASTOLIC BLOOD PRESSURE: 96 MMHG | HEART RATE: 74 BPM | WEIGHT: 287 LBS

## 2019-09-05 DIAGNOSIS — I10 ESSENTIAL HYPERTENSION: ICD-10-CM

## 2019-09-05 DIAGNOSIS — F90.9 ATTENTION DEFICIT HYPERACTIVITY DISORDER (ADHD), UNSPECIFIED ADHD TYPE: Primary | ICD-10-CM

## 2019-09-05 DIAGNOSIS — I10 ELEVATED BLOOD PRESSURE READING IN OFFICE WITH DIAGNOSIS OF HYPERTENSION: ICD-10-CM

## 2019-09-05 RX ORDER — DEXTROAMPHETAMINE SACCHARATE, AMPHETAMINE ASPARTATE MONOHYDRATE, DEXTROAMPHETAMINE SULFATE AND AMPHETAMINE SULFATE 5; 5; 5; 5 MG/1; MG/1; MG/1; MG/1
20 CAPSULE, EXTENDED RELEASE ORAL DAILY
Qty: 30 CAP | Refills: 0 | Status: SHIPPED | OUTPATIENT
Start: 2019-11-04 | End: 2019-12-09 | Stop reason: SDUPTHER

## 2019-09-05 RX ORDER — HYDROCHLOROTHIAZIDE 25 MG/1
25 TABLET ORAL DAILY
Qty: 90 TAB | Refills: 3 | Status: CANCELLED | OUTPATIENT
Start: 2019-09-05

## 2019-09-05 RX ORDER — DEXTROAMPHETAMINE SACCHARATE, AMPHETAMINE ASPARTATE MONOHYDRATE, DEXTROAMPHETAMINE SULFATE AND AMPHETAMINE SULFATE 5; 5; 5; 5 MG/1; MG/1; MG/1; MG/1
20 CAPSULE, EXTENDED RELEASE ORAL DAILY
Qty: 30 CAP | Refills: 0 | Status: SHIPPED | OUTPATIENT
Start: 2019-10-05 | End: 2019-12-09 | Stop reason: SDUPTHER

## 2019-09-05 RX ORDER — DEXTROAMPHETAMINE SACCHARATE, AMPHETAMINE ASPARTATE MONOHYDRATE, DEXTROAMPHETAMINE SULFATE AND AMPHETAMINE SULFATE 5; 5; 5; 5 MG/1; MG/1; MG/1; MG/1
20 CAPSULE, EXTENDED RELEASE ORAL DAILY
Qty: 30 CAP | Refills: 0 | Status: SHIPPED | OUTPATIENT
Start: 2019-09-05 | End: 2019-12-09 | Stop reason: SDUPTHER

## 2019-09-05 RX ORDER — TELMISARTAN 80 MG/1
80 TABLET ORAL DAILY
Qty: 90 TAB | Refills: 3 | Status: CANCELLED | OUTPATIENT
Start: 2019-09-05

## 2019-09-05 RX ORDER — AMLODIPINE BESYLATE 5 MG/1
5 TABLET ORAL DAILY
Qty: 90 TAB | Refills: 3 | Status: CANCELLED | OUTPATIENT
Start: 2019-09-05

## 2019-09-05 NOTE — PROGRESS NOTES
Here for controlled med refill and BP check.  reviewed. Not intentional but off med since June. Having pbs getting to office with work schedule. Ran out of BP meds 2-3 weeks ago. Patient denies chest pain, dyspnea, unexpected weight change, unexpected pain, mood or memory changes. Visit Vitals  BP (!) 153/96 (BP 1 Location: Left arm, BP Patient Position: Sitting)   Pulse 74   Temp 98.4 °F (36.9 °C) (Oral)   Resp 18   Ht 6' 2.5\" (1.892 m)   Wt 287 lb (130.2 kg)   SpO2 97%   BMI 36.36 kg/m²     Patient alert and cooperative. Reviewed above. Assessment:  1. ADD. 2. HTN with elevated readings, off meds. Plan:  1. Refilled current BP meds. 2. Return in two weeks for recheck back on meds. 3. Three months postdated scripts controlled med for ADD. 4. Follow otherwise here prn.

## 2019-09-19 ENCOUNTER — OFFICE VISIT (OUTPATIENT)
Dept: FAMILY MEDICINE CLINIC | Age: 44
End: 2019-09-19

## 2019-09-19 VITALS
RESPIRATION RATE: 18 BRPM | DIASTOLIC BLOOD PRESSURE: 84 MMHG | SYSTOLIC BLOOD PRESSURE: 121 MMHG | OXYGEN SATURATION: 97 % | WEIGHT: 281.7 LBS | BODY MASS INDEX: 35.03 KG/M2 | TEMPERATURE: 98.8 F | HEIGHT: 75 IN | HEART RATE: 80 BPM

## 2019-09-19 DIAGNOSIS — I10 ESSENTIAL HYPERTENSION: Primary | ICD-10-CM

## 2019-09-19 NOTE — PROGRESS NOTES
Jose Angel Suarez  Identified pt with two pt identifiers(name and ). Chief Complaint   Patient presents with    Blood Pressure Check       1. Have you been to the ER, urgent care clinic since your last visit? Hospitalized since your last visit? No    2. Have you seen or consulted any other health care providers outside of the [address] [place] [address] since your last visit? Include any pap smears or colon screening. No      Would you like to sign up for MyChart today, if you have not already done so? Patient has a mychart  If not, would you like information on MyChart, and how to sign up at a later time? No      Medication reconciliation up to date and corrected with patient at this time. Today's provider has been notified of reason for visit, vitals and flowsheets obtained on patients. Reviewed record in preparation for visit, huddled with provider and have obtained necessary documentation.       Health Maintenance Due   Topic    Influenza Age 5 to Adult        Wt Readings from Last 3 Encounters:   19 281 lb 11.2 oz (127.8 kg)   19 287 lb (130.2 kg)   19 278 lb 14.4 oz (126.5 kg)     Temp Readings from Last 3 Encounters:   19 98.8 °F (37.1 °C) (Oral)   19 98.4 °F (36.9 °C) (Oral)   19 98.5 °F (36.9 °C) (Oral)     BP Readings from Last 3 Encounters:   19 121/84   19 (!) 153/96   19 136/80     Pulse Readings from Last 3 Encounters:   19 80   19 74   19 79     Vitals:    19 1551   BP: 121/84   Pulse: 80   Resp: 18   Temp: 98.8 °F (37.1 °C)   TempSrc: Oral   SpO2: 97%   Weight: 281 lb 11.2 oz (127.8 kg)   Height: 6' 2.5\" (1.892 m)   PainSc:   0 - No pain         Learning Assessment:  :     Learning Assessment 2019   PRIMARY LEARNER Patient Patient Patient   BARRIERS PRIMARY LEARNER - NONE NONE   CO-LEARNER CAREGIVER - No No   PRIMARY LANGUAGE ENGLISH ENGLISH ENGLISH   LEARNER PREFERENCE PRIMARY DEMONSTRATION DEMONSTRATION OTHER (COMMENT)     - LISTENING -   ANSWERED BY patient Patien self   RELATIONSHIP SELF SELF -       Depression Screening:  :     3 most recent PHQ Screens 5/22/2019   Little interest or pleasure in doing things Not at all   Feeling down, depressed, irritable, or hopeless Not at all   Total Score PHQ 2 0       Fall Risk Assessment:  :     Fall Risk Assessment, last 12 mths 5/8/2019   Able to walk? Yes   Fall in past 12 months? No       Abuse Screening:  :     Abuse Screening Questionnaire 5/22/2019 5/8/2019 1/31/2019 11/29/2018 6/1/2018   Do you ever feel afraid of your partner? N N N N N   Are you in a relationship with someone who physically or mentally threatens you? N N N N N   Is it safe for you to go home? Y Y Y Y Y       ADL Screening:  :     No flowsheet data found.

## 2019-09-19 NOTE — PROGRESS NOTES
Here for f/u BP back on meds. Patient denies chest pain, dyspnea, unexpected weight change, unexpected pain, mood or memory changes. Visit Vitals  /84 (BP 1 Location: Right arm, BP Patient Position: Sitting)   Pulse 80   Temp 98.8 °F (37.1 °C) (Oral)   Resp 18   Ht 6' 2.5\" (1.892 m)   Wt 281 lb 11.2 oz (127.8 kg)   SpO2 97%   BMI 35.68 kg/m²     Patient alert and cooperative. Assessment:  1. HTN, well controlled on three med combo. Plan:  1. Return for annual when due. 2. Follow otherwise here prn.

## 2019-12-05 DIAGNOSIS — F90.9 ATTENTION DEFICIT HYPERACTIVITY DISORDER (ADHD), UNSPECIFIED ADHD TYPE: ICD-10-CM

## 2019-12-05 RX ORDER — DEXTROAMPHETAMINE SACCHARATE, AMPHETAMINE ASPARTATE MONOHYDRATE, DEXTROAMPHETAMINE SULFATE AND AMPHETAMINE SULFATE 5; 5; 5; 5 MG/1; MG/1; MG/1; MG/1
20 CAPSULE, EXTENDED RELEASE ORAL DAILY
Qty: 30 CAP | Refills: 0 | OUTPATIENT
Start: 2019-12-05 | End: 2020-01-04

## 2019-12-05 NOTE — TELEPHONE ENCOUNTER
Pt requesting refill for:    Requested Prescriptions     Pending Prescriptions Disp Refills    amphetamine-dextroamphetamine XR (ADDERALL XR) 20 mg XR capsule 30 Cap 0     Sig: Take 1 Cap by mouth daily for 30 days. Max Daily Amount: 20 mg. Last refilled 11/4/19. Last office visit 9/19/19. Next office visit not scheduled yet.

## 2019-12-05 NOTE — TELEPHONE ENCOUNTER
2 patient identifiers confirmed. Pt notified that he is in need of a 3 month follow up prior to refill of Adderall XR. Pt verbalized understanding, appt scheduled 12/9/19 with Dr. Jose G Rivera.

## 2019-12-09 ENCOUNTER — OFFICE VISIT (OUTPATIENT)
Dept: FAMILY MEDICINE CLINIC | Age: 44
End: 2019-12-09

## 2019-12-09 VITALS
TEMPERATURE: 96.4 F | SYSTOLIC BLOOD PRESSURE: 171 MMHG | DIASTOLIC BLOOD PRESSURE: 109 MMHG | HEIGHT: 75 IN | BODY MASS INDEX: 36.88 KG/M2 | HEART RATE: 78 BPM | WEIGHT: 296.6 LBS | OXYGEN SATURATION: 96 %

## 2019-12-09 DIAGNOSIS — I10 ELEVATED BLOOD PRESSURE READING IN OFFICE WITH DIAGNOSIS OF HYPERTENSION: ICD-10-CM

## 2019-12-09 DIAGNOSIS — F90.9 ATTENTION DEFICIT HYPERACTIVITY DISORDER (ADHD), UNSPECIFIED ADHD TYPE: ICD-10-CM

## 2019-12-09 DIAGNOSIS — I10 ESSENTIAL HYPERTENSION: Primary | ICD-10-CM

## 2019-12-09 RX ORDER — DEXTROAMPHETAMINE SACCHARATE, AMPHETAMINE ASPARTATE MONOHYDRATE, DEXTROAMPHETAMINE SULFATE AND AMPHETAMINE SULFATE 5; 5; 5; 5 MG/1; MG/1; MG/1; MG/1
20 CAPSULE, EXTENDED RELEASE ORAL DAILY
Qty: 30 CAP | Refills: 0 | Status: SHIPPED | OUTPATIENT
Start: 2020-01-09 | End: 2020-06-18 | Stop reason: SDUPTHER

## 2019-12-09 RX ORDER — DEXTROAMPHETAMINE SACCHARATE, AMPHETAMINE ASPARTATE MONOHYDRATE, DEXTROAMPHETAMINE SULFATE AND AMPHETAMINE SULFATE 5; 5; 5; 5 MG/1; MG/1; MG/1; MG/1
20 CAPSULE, EXTENDED RELEASE ORAL DAILY
Qty: 30 CAP | Refills: 0 | Status: SHIPPED | OUTPATIENT
Start: 2019-12-09 | End: 2020-06-18 | Stop reason: SDUPTHER

## 2019-12-09 RX ORDER — DEXTROAMPHETAMINE SACCHARATE, AMPHETAMINE ASPARTATE MONOHYDRATE, DEXTROAMPHETAMINE SULFATE AND AMPHETAMINE SULFATE 5; 5; 5; 5 MG/1; MG/1; MG/1; MG/1
20 CAPSULE, EXTENDED RELEASE ORAL DAILY
Qty: 30 CAP | Refills: 0 | Status: SHIPPED | OUTPATIENT
Start: 2020-02-09 | End: 2020-06-18 | Stop reason: SDUPTHER

## 2019-12-09 NOTE — PROGRESS NOTES
[facility] took all 3 BP meds this AM.  Last checked BP 3 days ago. [facility] BP was O.K.  Here for controlled med refill for ADD.  unavailable. Patient denies chest pain, dyspnea, unexpected weight change, unexpected pain, mood or memory changes. Visit Vitals  BP (!) 171/109 (BP 1 Location: Right arm, BP Patient Position: Sitting)   Pulse 78   Temp 96.4 °F (35.8 °C) (Oral)   Ht 6' 2.5\" (1.892 m)   Wt 296 lb 9.6 oz (134.5 kg)   SpO2 96%   BMI 37.57 kg/m²     Patient alert and cooperative. Reviewed above. Assessment:  1. ADD, on chronic controlled stimulant med. Plan:  1. Three months postdated scripts printed. 2. Follow up in three months. 3. Continue to check outpatient BP readings and return if persists elevated. 4. Follow otherwise here prn.

## 2019-12-09 NOTE — PROGRESS NOTES
Elysia Quinn is a 38 y.o. male. Chief Complaint   Patient presents with    Medication Refill     Adderall XR 20mg     Visit Vitals  BP (!) 171/109 (BP 1 Location: Right arm, BP Patient Position: Sitting)   Pulse 78   Temp 96.4 °F (35.8 °C) (Oral)   Ht 6' 2.5\" (1.892 m)   Wt 296 lb 9.6 oz (134.5 kg)   SpO2 96%   BMI 37.57 kg/m²       1. Have you been to the ER, urgent care clinic since your last visit? Hospitalized since your last visit? No    2. Have you seen or consulted any other health care providers outside of the [address] [place] [address] since your last visit? Include any pap smears or colon screening.  No

## 2020-03-09 ENCOUNTER — OFFICE VISIT (OUTPATIENT)
Dept: FAMILY MEDICINE CLINIC | Age: 45
End: 2020-03-09

## 2020-03-09 VITALS
RESPIRATION RATE: 20 BRPM | HEIGHT: 75 IN | TEMPERATURE: 98.2 F | DIASTOLIC BLOOD PRESSURE: 101 MMHG | WEIGHT: 291 LBS | SYSTOLIC BLOOD PRESSURE: 160 MMHG | OXYGEN SATURATION: 98 % | HEART RATE: 72 BPM | BODY MASS INDEX: 36.18 KG/M2

## 2020-03-09 DIAGNOSIS — R73.09 INCREASED GLUCOSE LEVEL: ICD-10-CM

## 2020-03-09 DIAGNOSIS — I10 ESSENTIAL HYPERTENSION: Primary | ICD-10-CM

## 2020-03-09 DIAGNOSIS — I10 ELEVATED BLOOD PRESSURE READING IN OFFICE WITH DIAGNOSIS OF HYPERTENSION: ICD-10-CM

## 2020-03-09 DIAGNOSIS — R73.03 PREDIABETES: ICD-10-CM

## 2020-03-09 DIAGNOSIS — E66.01 SEVERE OBESITY (HCC): ICD-10-CM

## 2020-03-09 LAB — HBA1C MFR BLD HPLC: 5.9 %

## 2020-03-09 NOTE — PROGRESS NOTES
Takes all BP meds in AM.  Lots of stress. 5 people down at work. Mother getting surgery on 19th. Patient denies chest pain, dyspnea, unexpected weight change, unexpected pain, mood or memory changes. Visit Vitals  BP (!) 160/101 (BP 1 Location: Right arm)   Pulse 72   Temp 98.2 °F (36.8 °C) (Oral)   Resp 20   Ht 6' 2.5\" (1.892 m)   Wt 291 lb (132 kg)   SpO2 98%   BMI 36.86 kg/m²     Patient alert and cooperative. Reviewed above. Assessment:  1. Prediabetes, unchanged. 2. HTN with high readings in office, no outpatient readings. Plan:  1. Try taking split dosing of meds, diuretic in morning and 1-2 of the other meds in the evening. 2. Return in a month. 3. Get outpatient readings. 4. Follow otherwise here prn.

## 2020-03-09 NOTE — PROGRESS NOTES
Sarah Rivero is a 38 y.o. male  HIPAA verified by two patient identifiers. Health Maintenance Due   Topic    Influenza Age 5 to Adult      Chief Complaint   Patient presents with    Hypertension     3 month follow up     Visit Vitals  BP (!) 160/101 (BP 1 Location: Right arm)   Pulse 72   Temp 98.2 °F (36.8 °C) (Oral)   Resp 20   Ht 6' 2.5\" (1.892 m)   Wt 291 lb (132 kg)   SpO2 98%   BMI 36.86 kg/m²       Pain Scale: 0 - No pain/10  Pain Location:     1. Have you been to the ER, urgent care clinic since your last visit? Hospitalized since your last visit? No    2. Have you seen or consulted any other health care providers outside of the [address] [place] [address] since your last visit? Include any pap smears or colon screening.  No

## 2020-03-10 ENCOUNTER — HOSPITAL ENCOUNTER (EMERGENCY)
Age: 45
Discharge: HOME | End: 2020-03-10
Attending: EMERGENCY MEDICINE
Payer: COMMERCIAL

## 2020-03-10 ENCOUNTER — APPOINTMENT (OUTPATIENT)
Dept: GENERAL RADIOLOGY | Age: 45
End: 2020-03-10
Attending: EMERGENCY MEDICINE
Payer: COMMERCIAL

## 2020-03-10 VITALS
WEIGHT: 286.82 LBS | HEART RATE: 63 BPM | RESPIRATION RATE: 16 BRPM | OXYGEN SATURATION: 93 % | SYSTOLIC BLOOD PRESSURE: 161 MMHG | TEMPERATURE: 98.3 F | HEIGHT: 75 IN | DIASTOLIC BLOOD PRESSURE: 102 MMHG | BODY MASS INDEX: 35.66 KG/M2

## 2020-03-10 DIAGNOSIS — M25.572 ACUTE LEFT ANKLE PAIN: Primary | ICD-10-CM

## 2020-03-10 PROCEDURE — 99282 EMERGENCY DEPT VISIT SF MDM: CPT

## 2020-03-10 PROCEDURE — 73610 X-RAY EXAM OF ANKLE: CPT

## 2020-03-10 RX ORDER — COLCHICINE 0.6 MG/1
0.6 TABLET ORAL DAILY
Qty: 7 TAB | Refills: 0 | Status: SHIPPED | OUTPATIENT
Start: 2020-03-10 | End: 2020-07-04 | Stop reason: CLARIF

## 2020-03-10 RX ORDER — NAPROXEN 500 MG/1
500 TABLET ORAL 2 TIMES DAILY WITH MEALS
Qty: 20 TAB | Refills: 0 | Status: SHIPPED | OUTPATIENT
Start: 2020-03-10 | End: 2020-03-20

## 2020-03-10 NOTE — LETTER
[address] 
[facility] EMERGENCY DEPT 
[address] [place] [address] [place] Nguyen [id number] 
[phone] Work/School Note Date: 3/10/2020 To Whom It May concern: 
 
Marcie Amaya was seen and treated today in the emergency room by the following provider(s): 
Attending Provider: Spencer Valencia MD. Marcie Amaya did be excused from work on March 10 and March 11, 2020. Sincerely, Law Salas MD

## 2020-03-10 NOTE — ED NOTES
Bedside shift change report given to Oneal Rivera (oncoming nurse) by Denise (offgoing nurse). Report included the following information SBAR and ED Summary.

## 2020-03-10 NOTE — DISCHARGE INSTRUCTIONS
Patient Education   Patient Education        Ankle Sprain: Care Instructions  Your Care Instructions    An ankle sprain can happen when you twist your ankle. The ligaments that support the ankle can get stretched and torn. Often the ankle is swollen and painful. Ankle sprains may take from several weeks to several months to heal. Usually, the more pain and swelling you have, the more severe your ankle sprain is and the longer it will take to heal. You can heal faster and regain strength in your ankle with good home treatment. It is very important to give your ankle time to heal completely, so that you do not easily hurt your ankle again. Follow-up care is a key part of your treatment and safety. Be sure to make and go to all appointments, and call your doctor if you are having problems. It's also a good idea to know your test results and keep a list of the medicines you take. How can you care for yourself at home? · Prop up your foot on pillows as much as possible for the next 3 days. Try to keep your ankle above the level of your heart. This will help reduce the swelling. · Follow your doctor's directions for wearing a splint or elastic bandage. Wrapping the ankle may help reduce or prevent swelling. · Your doctor may give you a splint, a brace, an air stirrup, or another form of ankle support to protect your ankle until it is healed. Wear it as directed while your ankle is healing. Do not remove it unless your doctor tells you to. After your ankle has healed, ask your doctor whether you should wear the brace when you exercise. · Put ice or cold packs on your injured ankle for 10 to 20 minutes at a time. Try to do this every 1 to 2 hours for the next 3 days (when you are awake) or until the swelling goes down. Put a thin cloth between the ice and your skin. · You may need to use crutches until you can walk without pain.  If you do use crutches, try to bear some weight on your injured ankle if you can do so without pain. This helps the ankle heal.  · Take pain medicines exactly as directed. ? If the doctor gave you a prescription medicine for pain, take it as prescribed. ? If you are not taking a prescription pain medicine, ask your doctor if you can take an over-the-counter medicine. · If you have been given ankle exercises to do at home, do them exactly as instructed. These can promote healing and help prevent lasting weakness. When should you call for help? Call your doctor now or seek immediate medical care if:    · Your pain is getting worse.     · Your swelling is getting worse.     · Your splint feels too tight or you are unable to loosen it.    Watch closely for changes in your health, and be sure to contact your doctor if:    · You are not getting better after 1 week. Where can you learn more? Go to http://yany-seble.info/. Enter S637 in the search box to learn more about \"Ankle Sprain: Care Instructions. \"  Current as of: June 26, 2019  Content Version: 12.2  © 4828-9408 [employer]. Care instructions adapted under license by [employer]who (which disclaims liability or warranty for this information). If you have questions about a medical condition or this instruction, always ask your healthcare professional. [address] any warranty or liability for your use of this information. Gout: Care Instructions  Your Care Instructions    Gout is a form of arthritis caused by a buildup of uric acid crystals in a joint. It causes sudden attacks of pain, swelling, redness, and stiffness, usually in one joint, especially the big toe. Gout usually comes on without a cause. But it can be brought on by drinking alcohol (especially beer) or eating seafood and red meat. Taking certain medicines, such as diuretics or aspirin, also can bring on an attack of gout.   Taking your medicines as prescribed and following up with your doctor regularly can help you avoid gout attacks in the future. Follow-up care is a key part of your treatment and safety. Be sure to make and go to all appointments, and call your doctor if you are having problems. It's also a good idea to know your test results and keep a list of the medicines you take. How can you care for yourself at home? · If the joint is swollen, put ice or a cold pack on the area for 10 to 20 minutes at a time. Put a thin cloth between the ice and your skin. · Prop up the sore limb on a pillow when you ice it or anytime you sit or lie down during the next 3 days. Try to keep it above the level of your heart. This will help reduce swelling. · Rest sore joints. Avoid activities that put weight or strain on the joints for a few days. Take short rest breaks from your regular activities during the day. · Take your medicines exactly as prescribed. Call your doctor if you think you are having a problem with your medicine. · Take pain medicines exactly as directed. ? If the doctor gave you a prescription medicine for pain, take it as prescribed. ? If you are not taking a prescription pain medicine, ask your doctor if you can take an over-the-counter medicine. · Eat less seafood and red meat. · Check with your doctor before drinking alcohol. · Losing weight, if you are overweight, may help reduce attacks of gout. But do not go on a [employer]. \" Losing a lot of weight in a short amount of time can cause a gout attack. When should you call for help? Call your doctor now or seek immediate medical care if:    · You have a fever.     · The joint is so painful you cannot use it.     · You have sudden, unexplained swelling, redness, warmth, or severe pain in one or more joints.    Watch closely for changes in your health, and be sure to contact your doctor if:    · You have joint pain.     · Your symptoms get worse or are not improving after 2 or 3 days. Where can you learn more?   Go to http://yany-seble.info/. Enter C240 in the search box to learn more about \"Gout: Care Instructions. \"  Current as of: April 1, 2019  Content Version: 12.2  © 9630-3490 [employer]. Care instructions adapted under license by [employer] (which disclaims liability or warranty for this information). If you have questions about a medical condition or this instruction, always ask your healthcare professional. [place] [address] any warranty or liability for your use of this information.

## 2020-03-10 NOTE — ED PROVIDER NOTES
EMERGENCY DEPARTMENT HISTORY AND PHYSICAL EXAM      Date: 3/10/2020  Patient Name: Ruthy Lane    History of Presenting Illness     Chief Complaint   Patient presents with    Ankle Pain       History Provided By: Patient    HPI: Ruthy Lane, 38 y.o. male with PMHx significant for gout and hypertension presents to the emergency room with left ankle pain that started yesterday. Patient denies any known injury. He has not taken any medications for his pain. Patient tried to go to work this morning and stepped down out of his truck and felt severe pain in his left ankle and decided to come to the emergency room for evaluation because he did not think he could make it through the workday. He denies any fevers, chills, nausea, vomiting. He denies any other joint pain at this time. He denies any significant swelling of his ankle. PCP: Sarah Heredia MD    No current facility-administered medications on file prior to encounter. Current Outpatient Medications on File Prior to Encounter   Medication Sig Dispense Refill    amphetamine-dextroamphetamine XR (ADDERALL XR) 20 mg XR capsule Take 1 Cap by mouth daily for 30 days. Max Daily Amount: 20 mg. 30 Cap 0    hydroCHLOROthiazide (HYDRODIURIL) 25 mg tablet Take 1 Tab by mouth daily. 90 Tab 3    telmisartan (MICARDIS) 80 mg tablet Take 1 Tab by mouth daily. 90 Tab 3    amLODIPine (NORVASC) 5 mg tablet Take 1 Tab by mouth daily.  80 Tab 3       Past History     Past Medical History:  Past Medical History:   Diagnosis Date    Advance directive discussed with patient 10/22/2015    IBS (irritable bowel syndrome)     Knee pain, acute 10/02/14     prepatellar bursitis [place] 10/09/14    Ruptured tendon 2/2014    left distal biceps tendon (WC injury)       Past Surgical History:  Past Surgical History:   Procedure Laterality Date    HX ORTHOPAEDIC Left 1979    foot injury    [facility] EXTRAC ERUPTED TOOTH/EXPOSED ROOT  08/2017    Having teeth pulled and will have more dental work. Family History:  Family History   Problem Relation Age of Onset    Cancer Maternal Grandmother        Social History:  Social History     Tobacco Use    Smoking status: Never Smoker    Smokeless tobacco: Never Used   Substance Use Topics    Alcohol use: Not Currently     Comment: ; weekends    Drug use: No       Allergies:  No Known Allergies      Review of Systems   Review of Systems   Constitutional: Negative for chills and fever. HENT: Negative for congestion and sore throat. Eyes: Negative. Respiratory: Negative for shortness of breath. Cardiovascular: Negative for chest pain, palpitations and leg swelling. Gastrointestinal: Negative for abdominal pain. Genitourinary: Negative for dysuria and hematuria. Musculoskeletal: Positive for arthralgias and gait problem. Negative for myalgias and neck pain. Skin: Negative for color change and rash. Neurological: Negative for syncope and headaches. All other systems reviewed and are negative. Physical Exam    General appearance - well nourished, well appearing, and in no distress    Neck - supple, no significant adenopathy; non-tender to palpation  Chest - clear to auscultation, no wheezes, rales or rhonchi; non-tender to palpation  Heart - normal rate and regular rhythm, S1 and S2 normal, no murmurs noted  Abdomen - soft, nontender, nondistended, no masses or organomegaly  Musculoskeletal -left ankle tender to palpation, no deformity or swelling; normal ROM  Extremities - peripheral pulses normal, no pedal edema  Skin - normal coloration and turgor, no rashes  Neurological - alert, oriented x3, normal speech, no focal findings or movement disorder noted    Diagnostic Study Results     Labs -   No results found for this or any previous visit (from the past 12 hour(s)). Radiologic Studies -   XR ANKLE LT MIN 3 V   Final Result   IMPRESSION: No acute abnormality.         CT Results  (Last 48 hours) None        CXR Results  (Last 48 hours)    None            Medical Decision Making   I am the first provider for this patient. I reviewed the vital signs, available nursing notes, past medical history, past surgical history, family history and social history. Vital Signs-Reviewed the patient's vital signs. Patient Vitals for the past 12 hrs:   Temp Pulse Resp BP SpO2   03/10/20 0535 98.3 °F (36.8 °C) 63 16 (!) 161/102 93 %           Records Reviewed: Nursing Notes and Old Medical Records    Provider Notes (Medical Decision Making):   Differential diagnosis: Sprain, strain, gout, arthritis, fracture    We will treat patient with anti-inflammatory medications and colchicine. X-ray does not show any acute abnormality will encourage close follow-up with PCP. Return to ED for worsening symptoms or fevers. ED Course:   Initial assessment performed. The patients presenting problems have been discussed, and they are in agreement with the care plan formulated and outlined with them. I have encouraged them to ask questions as they arise throughout their visit. Disposition:  Discharge home    PLAN:  1. Current Discharge Medication List      START taking these medications    Details   naproxen (NAPROSYN) 500 mg tablet Take 1 Tab by mouth two (2) times daily (with meals) for 10 days. Qty: 20 Tab, Refills: 0      colchicine 0.6 mg tablet Take 1 Tab by mouth daily. Qty: 7 Tab, Refills: 0           2. Follow-up Information     Follow up With Specialties Details Why Contact Info    [facility] EMERGENCY DEPT Emergency Medicine  If symptoms worsen [address] [place] [address]  [phone]    Alejandro Heredia MD Family Practice In 2 days  [address]  [phone]          Return to ED if worse     Diagnosis     Clinical Impression:   1.  Acute left ankle pain

## 2020-03-11 ENCOUNTER — OFFICE VISIT (OUTPATIENT)
Dept: FAMILY MEDICINE CLINIC | Age: 45
End: 2020-03-11

## 2020-03-11 VITALS
OXYGEN SATURATION: 97 % | RESPIRATION RATE: 19 BRPM | BODY MASS INDEX: 35.56 KG/M2 | TEMPERATURE: 96.6 F | HEART RATE: 83 BPM | WEIGHT: 286 LBS | HEIGHT: 75 IN | SYSTOLIC BLOOD PRESSURE: 147 MMHG | DIASTOLIC BLOOD PRESSURE: 90 MMHG

## 2020-03-11 DIAGNOSIS — M10.9 ACUTE GOUT OF LEFT ANKLE, UNSPECIFIED CAUSE: Primary | ICD-10-CM

## 2020-03-11 DIAGNOSIS — I10 ELEVATED BLOOD PRESSURE READING IN OFFICE WITH DIAGNOSIS OF HYPERTENSION: ICD-10-CM

## 2020-03-11 DIAGNOSIS — I10 ESSENTIAL HYPERTENSION: ICD-10-CM

## 2020-03-11 NOTE — LETTER
NOTIFICATION RETURN TO WORK / SCHOOL 
 
3/11/2020 11:52 AM 
 
Mr. Jabier Nails [address] [facility] [place] [facility] To Whom It May Concern: 
 
Jabier Nails is currently under the care of Albert Cisneros. He will return to work/school on: 3/16/20 If there are questions or concerns please have the patient contact our office. Sincerely, Merlyn Murray MD

## 2020-03-11 NOTE — PROGRESS NOTES
Last gout attack > 1 yr. ago. No known trigger. Using ice and heat. Rec stick with ice for the swelling. Keep well hydrated. Needs work note. Visit Vitals  /90   Pulse 83   Temp 96.6 °F (35.9 °C) (Oral)   Resp 19   Ht 6' 3\" (1.905 m)   Wt 286 lb (129.7 kg)   SpO2 97%   BMI 35.75 kg/m²     Patient alert and cooperative. Left foot with ace wrap. Assessment:  1. Acute gout, left ankle. Plan:  1. Continue meds given by ER. 2. Follow up Monday if still symptomatic for extension of work note. 3. Recheck here otherwise prn.

## 2020-03-11 NOTE — PROGRESS NOTES
Identified pt with two pt identifiers(name and ). Reviewed record in preparation for visit and have obtained necessary documentation. Chief Complaint   Patient presents with    Gout     L foot        There are no preventive care reminders to display for this patient. Coordination of Care Questionnaire:  :   1) Have you been to an emergency room, urgent care, or hospitalized since your last visit? If yes, where when, and reason for visit? yes   - 3/10/20: [facility] ED for gout    2. Have seen or consulted any other health care provider since your last visit? If yes, where when, and reason for visit? NO      Patient is accompanied by self I have received verbal consent from James Kohli to discuss any/all medical information while they are present in the room.

## 2020-06-18 ENCOUNTER — VIRTUAL VISIT (OUTPATIENT)
Dept: FAMILY MEDICINE CLINIC | Age: 45
End: 2020-06-18

## 2020-06-18 DIAGNOSIS — F90.9 ATTENTION DEFICIT HYPERACTIVITY DISORDER (ADHD), UNSPECIFIED ADHD TYPE: ICD-10-CM

## 2020-06-18 RX ORDER — DEXTROAMPHETAMINE SACCHARATE, AMPHETAMINE ASPARTATE MONOHYDRATE, DEXTROAMPHETAMINE SULFATE AND AMPHETAMINE SULFATE 5; 5; 5; 5 MG/1; MG/1; MG/1; MG/1
20 CAPSULE, EXTENDED RELEASE ORAL DAILY
Qty: 30 CAP | Refills: 0 | Status: SHIPPED | OUTPATIENT
Start: 2020-06-18 | End: 2020-07-04 | Stop reason: CLARIF

## 2020-06-18 RX ORDER — DEXTROAMPHETAMINE SACCHARATE, AMPHETAMINE ASPARTATE MONOHYDRATE, DEXTROAMPHETAMINE SULFATE AND AMPHETAMINE SULFATE 5; 5; 5; 5 MG/1; MG/1; MG/1; MG/1
20 CAPSULE, EXTENDED RELEASE ORAL DAILY
Qty: 30 CAP | Refills: 0 | Status: SHIPPED | OUTPATIENT
Start: 2020-07-18 | End: 2020-08-17

## 2020-06-18 RX ORDER — DEXTROAMPHETAMINE SACCHARATE, AMPHETAMINE ASPARTATE MONOHYDRATE, DEXTROAMPHETAMINE SULFATE AND AMPHETAMINE SULFATE 5; 5; 5; 5 MG/1; MG/1; MG/1; MG/1
CAPSULE, EXTENDED RELEASE ORAL
COMMUNITY
Start: 2020-03-27 | End: 2020-06-18 | Stop reason: SDUPTHER

## 2020-06-18 RX ORDER — DEXTROAMPHETAMINE SACCHARATE, AMPHETAMINE ASPARTATE MONOHYDRATE, DEXTROAMPHETAMINE SULFATE AND AMPHETAMINE SULFATE 5; 5; 5; 5 MG/1; MG/1; MG/1; MG/1
20 CAPSULE, EXTENDED RELEASE ORAL DAILY
Qty: 30 CAP | Refills: 0 | Status: SHIPPED | OUTPATIENT
Start: 2020-08-18 | End: 2020-07-04 | Stop reason: CLARIF

## 2020-06-18 NOTE — LETTER
NOTIFICATION RETURN TO WORK  
 
7/9/2020 9:02 AM 
 
Mr. Lv Tyler [address] [facility] [address] To Whom It May Concern: 
 
Lv Tyler is currently under the care of Albert Cisneros. He will return to work on: 7/15/20 If there are questions or concerns please have the patient contact our office. Sincerely, Cornelia Delaney MD

## 2020-06-18 NOTE — PROGRESS NOTES
**THIS IS A VIRTUAL VISIT VIA A VIDEO SYNCHRONOUS DISCUSSION OVER DOXY. ME PATIENT AGREED TO HAVE THEIR CARE DELIVERED OVER A [facility] Place[facility] VIDEO VISIT IN PLACE OF THEIR REGULARLY SCHEDULED OFFICE VISIT**       India Escamilla is a 38 y.o. male who was seen by synchronous (real-time) audio-video technology on 6/18/2020. Consent: India Escamilla, who was seen by synchronous (real-time) audio-video technology, and/or his healthcare decision maker, is aware that this patient-initiated, Telehealth encounter on 6/18/2020 is a billable service, with coverage as determined by his insurance carrier. He is aware that he may receive a bill and has provided verbal consent to proceed: Yes. Assessment & Plan:   Diagnoses and all orders for this visit:    1. Attention deficit hyperactivity disorder (ADHD), unspecified ADHD type  -     amphetamine-dextroamphetamine XR (ADDERALL XR) 20 mg XR capsule; Take 1 Cap by mouth daily for 30 days. Max Daily Amount: 20 mg.  -     amphetamine-dextroamphetamine XR (ADDERALL XR) 20 mg XR capsule; Take 1 Cap by mouth daily for 30 days. Max Daily Amount: 20 mg.  -     amphetamine-dextroamphetamine XR (ADDERALL XR) 20 mg XR capsule; Take 1 Cap by mouth daily for 30 days. Max Daily Amount: 20 mg. The complexity of medical decision making for this visit is moderate         I spent at least 5 minutes on this visit with this established patient. Subjective:   India Escamilla is a 38 y.o. male who was seen for Medication Refill    Needs Adderall refilled. Off meds for awhile.  reviewed. Advised annual labs due when office opens back up. Prior to Admission medications    Medication Sig Start Date End Date Taking? Authorizing Provider   amphetamine-dextroamphetamine XR (ADDERALL XR) 20 mg XR capsule TAKE 1 CAPSULE BY MOUTH ONCE DAILY FOR 30 DAYS .  DO NOT EXCEED 1 CAPSULE PER DAY FILL 2 9 2020 3/27/20  Yes Provider, Historical   hydroCHLOROthiazide (HYDRODIURIL) 25 mg tablet Take 1 Tab by mouth daily. 6/12/19  Yes Lynda Heredia MD   telmisartan (MICARDIS) 80 mg tablet Take 1 Tab by mouth daily. 6/12/19  Yes Lynda Heredia MD   amLODIPine (NORVASC) 5 mg tablet Take 1 Tab by mouth daily. 6/12/19  Yes Lynda Heredia MD   colchicine 0.6 mg tablet Take 1 Tab by mouth daily. 3/10/20   Chetna Goldsmith MD     No Known Allergies    Patient Active Problem List   Diagnosis Code    Essential hypertension I10    Low vitamin D level R79.89    Attention deficit hyperactivity disorder (ADHD) F90.9    Hyperuricemia E79.0    Elevated blood pressure reading in office with diagnosis of hypertension I10    Severe obesity (HCC) E66.01    Prediabetes R73.03     Current Outpatient Medications   Medication Sig Dispense Refill    amphetamine-dextroamphetamine XR (ADDERALL XR) 20 mg XR capsule TAKE 1 CAPSULE BY MOUTH ONCE DAILY FOR 30 DAYS . DO NOT EXCEED 1 CAPSULE PER DAY FILL 2 9 2020      hydroCHLOROthiazide (HYDRODIURIL) 25 mg tablet Take 1 Tab by mouth daily. 90 Tab 3    telmisartan (MICARDIS) 80 mg tablet Take 1 Tab by mouth daily. 90 Tab 3    amLODIPine (NORVASC) 5 mg tablet Take 1 Tab by mouth daily. 90 Tab 3    colchicine 0.6 mg tablet Take 1 Tab by mouth daily. 7 Tab 0     No Known Allergies  Past Medical History:   Diagnosis Date    Advance directive discussed with patient 10/22/2015    IBS (irritable bowel syndrome)     Knee pain, acute 10/02/14     prepatellar bursitis [place] 10/09/14    Ruptured tendon 2/2014    left distal biceps tendon (WC injury)     Past Surgical History:   Procedure Laterality Date    HX ORTHOPAEDIC Left 1979    foot injury    [place] EXTRAC ERUPTED TOOTH/EXPOSED ROOT  08/2017    Having teeth pulled and will have more dental work.      Family History   Problem Relation Age of Onset    Cancer Maternal Grandmother      Social History     Tobacco Use    Smoking status: Never Smoker    Smokeless tobacco: Never Used   Substance Use Topics    Alcohol use: Not Currently     Comment: ; weekends       ROS    Objective:   Vital Signs: (As obtained by patient/caregiver at home)  There were no vitals taken for this visit. [INSTRUCTIONS:  \"[x]\" Indicates a positive item  \"[]\" Indicates a negative item  -- DELETE ALL ITEMS NOT EXAMINED]    Constitutional: [x] Appears well-developed and well-nourished [x] No apparent distress      [] Abnormal -     Mental status: [x] Alert and awake  [x] Oriented to person/place/time [x] Able to follow commands    [] Abnormal -     Eyes:   EOM    [x]  Normal    [] Abnormal -   Sclera  [x]  Normal    [] Abnormal -          Discharge [x]  None visible   [] Abnormal -     Psychiatric:       [x] Normal Affect [] Abnormal -        [x] No Hallucinations    Other pertinent observable physical exam findings:-        We discussed the expected course, resolution and complications of the diagnosis(es) in detail. Medication risks, benefits, costs, interactions, and alternatives were discussed as indicated. I advised him to contact the office if his condition worsens, changes or fails to improve as anticipated. He expressed understanding with the diagnosis(es) and plan. Ashley Carolina is a 38 y.o. male who was evaluated by a video visit encounter for concerns as above. Patient identification was verified prior to start of the visit. A caregiver was present when appropriate. Due to this being a TeleHealth encounter ([facility] emergency), evaluation of the following organ systems was limited: Vitals/Constitutional/EENT/Resp/CV/GI//MS/Neuro/Skin/Heme-Lymph-Imm. Pursuant to the emergency declaration under the [facility], 1135 waiver authority and the [employer] and [employer] Act, this Virtual  Visit was conducted, with patient's (and/or legal guardian's) consent, to reduce the patient's risk of exposure to COVID-19 and provide necessary medical care. Services were provided through a video synchronous discussion virtually to substitute for in-person clinic visit. Patient and provider were located at their individual homes.       Kisha Anna MD

## 2020-06-18 NOTE — PROGRESS NOTES
Yodit Moseley is a 38 y.o. male  HIPAA verified by two patient identifiers. There are no preventive care reminders to display for this patient.   Chief Complaint   Patient presents with    Medication Refill     Chief Complaint   Patient presents with    Medication Refill     Patient-Reported Vitals 6/18/2020   Patient-Reported Weight 286lb   Patient-Reported Height 6'3   Patient-Reported Pulse 110   Patient-Reported Temperature 97.6   Patient-Reported SpO2 99       Pain Scale: /10  Pain Location:             VV # [phone]

## 2020-07-04 ENCOUNTER — HOSPITAL ENCOUNTER (EMERGENCY)
Age: 45
Discharge: HOME | End: 2020-07-04
Attending: EMERGENCY MEDICINE | Admitting: EMERGENCY MEDICINE
Payer: COMMERCIAL

## 2020-07-04 VITALS
RESPIRATION RATE: 18 BRPM | HEART RATE: 95 BPM | SYSTOLIC BLOOD PRESSURE: 180 MMHG | DIASTOLIC BLOOD PRESSURE: 114 MMHG | HEIGHT: 75 IN | OXYGEN SATURATION: 98 % | WEIGHT: 288.8 LBS | BODY MASS INDEX: 35.91 KG/M2 | TEMPERATURE: 98.2 F

## 2020-07-04 DIAGNOSIS — R03.0 ELEVATED BLOOD PRESSURE READING: ICD-10-CM

## 2020-07-04 DIAGNOSIS — Z87.39 HISTORY OF GOUT: ICD-10-CM

## 2020-07-04 DIAGNOSIS — M79.671 RIGHT FOOT PAIN: Primary | ICD-10-CM

## 2020-07-04 PROCEDURE — 99282 EMERGENCY DEPT VISIT SF MDM: CPT

## 2020-07-04 RX ORDER — COLCHICINE 0.6 MG/1
0.6 TABLET ORAL DAILY
Qty: 20 TAB | Refills: 0 | Status: SHIPPED | OUTPATIENT
Start: 2020-07-04 | End: 2020-07-06 | Stop reason: SDUPTHER

## 2020-07-04 RX ORDER — HYDROCODONE BITARTRATE AND ACETAMINOPHEN 5; 325 MG/1; MG/1
1 TABLET ORAL
Qty: 8 TAB | Refills: 0 | Status: SHIPPED | OUTPATIENT
Start: 2020-07-04 | End: 2020-07-07

## 2020-07-04 RX ORDER — PREDNISONE 20 MG/1
20 TABLET ORAL DAILY
Qty: 5 TAB | Refills: 0 | Status: SHIPPED | OUTPATIENT
Start: 2020-07-04 | End: 2020-07-09

## 2020-07-04 RX ORDER — NAPROXEN 500 MG/1
500 TABLET ORAL
Qty: 20 TAB | Refills: 0 | Status: SHIPPED | OUTPATIENT
Start: 2020-07-04 | End: 2020-07-14

## 2020-07-04 NOTE — LETTER
[address] 
[facility] EMERGENCY DEPT 
[address] [place] [address] Zuleika Clement [address] 
[phone] Work/School Note Date: 7/4/2020 To Whom It May concern: 
 
 
Edgar Malave was seen and treated today in the emergency room by the following provider(s): 
Attending Provider: George Abdullahi MD 
Physician Assistant: Lu Colon. Edgar Malave may return to work in 2 to 3 days as symptoms improve. Sincerely, Lu Tavera

## 2020-07-04 NOTE — DISCHARGE INSTRUCTIONS
Take medications as prescribed. Follow up with primary care for recheck. Return to the Emergency Dept for any concerns.

## 2020-07-05 NOTE — ED PROVIDER NOTES
EMERGENCY DEPARTMENT HISTORY AND PHYSICAL EXAM      Date: 7/4/2020  Patient Name: Kerney Goodpasture    History of Presenting Illness     Chief Complaint   Patient presents with    Foot Pain     Ambulatory into the ED with c/o pain to Rt foot x several days. Pt states he is having a gout flare. History Provided By: Patient    HPI: Kerney Goodpasture, 38 y.o. male presents ambulatory to the Emergency Dept with c/o R foot pain, redness and swelling for several days c/w gout flares he has experienced in the past.  Pt denied known injury/strain. No insect stings/bites. He denied fever/chills. No drainage from the foot. No numbness/tinglng. Increased pain with movement/weight bearing. He states he has taken Colchicine in the past but does not currently have any of this medication at home. Pt is o/w healthy without cough, congestion, ST, shortness of breath, chest pain, N/V/D. Chief Complaint: R foot pain  Duration: 2-3 Days  Timing:  Acute  Location: prox R foot  Quality: Aching  Severity: Moderate  Modifying Factors: pt with h/o gout, sx reminiscent of gout  Associated Symptoms: denies any other associated signs or symptoms        There are no other complaints, changes, or physical findings at this time. PCP: Angelita Mann MD    Current Outpatient Medications   Medication Sig Dispense Refill    colchicine 0.6 mg tablet Take 1 Tab by mouth daily. 20 Tab 0    HYDROcodone-acetaminophen (NORCO) 5-325 mg per tablet Take 1 Tab by mouth every eight (8) hours as needed for Pain for up to 3 days. Max Daily Amount: 3 Tabs. 8 Tab 0    naproxen (NAPROSYN) 500 mg tablet Take 1 Tab by mouth every twelve (12) hours as needed for Pain for up to 10 days. 20 Tab 0    predniSONE (DELTASONE) 20 mg tablet Take 20 mg by mouth daily for 5 days. With Breakfast 5 Tab 0    amLODIPine (NORVASC) 5 mg tablet Take 1 Tab by mouth daily.  90 Tab 3    [START ON 7/18/2020] amphetamine-dextroamphetamine XR (ADDERALL XR) 20 mg XR capsule Take 1 Cap by mouth daily for 30 days. Max Daily Amount: 20 mg. 30 Cap 0    hydroCHLOROthiazide (HYDRODIURIL) 25 mg tablet Take 1 Tab by mouth daily. 90 Tab 3    telmisartan (MICARDIS) 80 mg tablet Take 1 Tab by mouth daily. [address] Tab 3       Past History     Past Medical History:  Past Medical History:   Diagnosis Date    Advance directive discussed with patient 10/22/2015    IBS (irritable bowel syndrome)     Knee pain, acute 10/02/14     prepatellar bursitis Bubba Hymen 10/09/14    Ruptured tendon 2/2014    left distal biceps tendon (WC injury)       Past Surgical History:  Past Surgical History:   Procedure Laterality Date    HX ORTHOPAEDIC Left 1979    foot injury    [place] EXTRAC ERUPTED TOOTH/EXPOSED ROOT  08/2017    Having teeth pulled and will have more dental work. Family History:  Family History   Problem Relation Age of Onset    Cancer Maternal Grandmother        Social History:  Social History     Tobacco Use    Smoking status: Never Smoker    Smokeless tobacco: Never Used   Substance Use Topics    Alcohol use: Not Currently     Comment: ; weekends    Drug use: No       Allergies:  No Known Allergies      Review of Systems   Review of Systems   Constitutional: Negative for chills and fever. HENT: Negative for congestion, rhinorrhea and sore throat. Respiratory: Negative for cough and shortness of breath. Cardiovascular: Negative for chest pain and palpitations. Gastrointestinal: Negative for diarrhea, nausea and vomiting. Genitourinary: Negative for dysuria and hematuria. Musculoskeletal: Positive for arthralgias. Negative for neck pain and neck stiffness. Skin: Positive for color change. Negative for rash and wound. Allergic/Immunologic: Negative for food allergies and immunocompromised state. Neurological: Negative for weakness and numbness. Hematological: Negative for adenopathy. Does not bruise/bleed easily.    Psychiatric/Behavioral: Negative for agitation and confusion. All other systems reviewed and are negative. Physical Exam   Physical Exam  Vitals signs and nursing note reviewed. Constitutional:       General: He is not in acute distress. Appearance: Normal appearance. He is well-developed and normal weight. He is not diaphoretic. HENT:      Head: Normocephalic and atraumatic. Nose: Nose normal.      Mouth/Throat:      Pharynx: No oropharyngeal exudate. Eyes:      General: No scleral icterus. Right eye: No discharge. Left eye: No discharge. Conjunctiva/sclera: Conjunctivae normal.   Neck:      Musculoskeletal: Normal range of motion and neck supple. Thyroid: No thyromegaly. Vascular: No JVD. Trachea: No tracheal deviation. Cardiovascular:      Rate and Rhythm: Normal rate and regular rhythm. Pulses: Normal pulses. Heart sounds: Normal heart sounds. Pulmonary:      Effort: Pulmonary effort is normal. No respiratory distress. Breath sounds: Normal breath sounds. No wheezing. Musculoskeletal:         General: Swelling and tenderness present. Comments: Decreased A/P ROM to R foot due to tenderness with palpation/movement, no deformity, no crepitus, +erythema/heat. 2+ distal pulses, NVI, sensation grossly intact to light touch. Skin:     General: Skin is warm and dry. Capillary Refill: Capillary refill takes less than 2 seconds. Findings: Erythema present. Neurological:      Mental Status: He is alert and oriented to person, place, and time. Sensory: No sensory deficit. Motor: No abnormal muscle tone. Coordination: Coordination normal.   Psychiatric:         Mood and Affect: Mood normal.         Behavior: Behavior normal.         Judgment: Judgment normal.         Diagnostic Study Results     Labs -   No results found for this or any previous visit (from the past 12 hour(s)).     Radiologic Studies -   No orders to display         Medical Decision Making   I am the first provider for this patient. I reviewed the vital signs, available nursing notes, past medical history, past surgical history, family history and social history. Vital Signs-Reviewed the patient's vital signs. Patient Vitals for the past 12 hrs:   Temp Pulse Resp BP SpO2   07/04/20 1213 98.2 °F (36.8 °C) 95 18 (!) 180/114 98 %         Records Reviewed: Nursing Notes, Old Medical Records, Previous Radiology Studies and Previous Laboratory Studies    Provider Notes (Medical Decision Making):   Gout, cellulitis, DJD, RA    ED Course:   Initial assessment performed. The patients presenting problems have been discussed, and they are in agreement with the care plan formulated and outlined with them. I have encouraged them to ask questions as they arise throughout their visit. HTN COUNSELING  Reviewed the risks of uncontrolled HTN to include stroke, heart attack, renal failure, aneurysm and death. They will take their medications daily and follow up with their PCP for recheck. DISCHARGE NOTE:  The care plan has been outline with the patient and/or family, who verbally conveyed understanding and agreement. Available results have been reviewed. Patient and/or family understand the follow up plan as outlined and discharge instructions. Should their condition deterioration at any time after discharge the patient agrees to return, follow up sooner than outlined or seek medical assistance at the closest Emergency Room as soon as possible. Questions have been answered. Discharge instructions and educational information regarding the patient's diagnosis as well a list of reasons why the patient would want to seek immediate medical attention, should their condition change, were reviewed directly with the patient/family          PLAN:  1.    Discharge Medication List as of 7/4/2020 12:59 PM      START taking these medications    Details   colchicine 0.6 mg tablet Take 1 Tab by mouth daily., Normal, Disp-20 Tab, R-0      HYDROcodone-acetaminophen (NORCO) 5-325 mg per tablet Take 1 Tab by mouth every eight (8) hours as needed for Pain for up to 3 days. Max Daily Amount: 3 Tabs., Normal, Disp-8 Tab, R-0      naproxen (NAPROSYN) 500 mg tablet Take 1 Tab by mouth every twelve (12) hours as needed for Pain for up to 10 days. , Normal, Disp-20 Tab, R-0      predniSONE (DELTASONE) 20 mg tablet Take 20 mg by mouth daily for 5 days. With Breakfast, Normal, Disp-5 Tab, R-0         CONTINUE these medications which have NOT CHANGED    Details   amLODIPine (NORVASC) 5 mg tablet Take 1 Tab by mouth daily. , Normal, Disp-90 Tab, R-3      amphetamine-dextroamphetamine XR (ADDERALL XR) 20 mg XR capsule Take 1 Cap by mouth daily for 30 days. Max Daily Amount: 20 mg., Normal, Disp-30 Cap, R-0      hydroCHLOROthiazide (HYDRODIURIL) 25 mg tablet Take 1 Tab by mouth daily. , Normal, Disp-90 Tab, R-3      telmisartan (MICARDIS) 80 mg tablet Take 1 Tab by mouth daily. , Normal, Disp-90 Tab, R-3           2. Follow-up Information     Follow up With Specialties Details Why Contact Info    Read, Valdez Myers, [address]  [phone]      [facility] EMERGENCY DEPT Emergency Medicine  If symptoms worsen [address] [place] [address] Drive  [address] [facility]  [phone]        Return to ED if worse     Diagnosis     Clinical Impression:   1. Right foot pain    2. History of gout    3.  Elevated blood pressure reading

## 2020-07-06 DIAGNOSIS — M10.9 ACUTE GOUT OF FOOT, UNSPECIFIED CAUSE, UNSPECIFIED LATERALITY: Primary | ICD-10-CM

## 2020-07-06 RX ORDER — COLCHICINE 0.6 MG/1
0.6 TABLET ORAL 2 TIMES DAILY
Qty: 30 TAB | Refills: 0 | Status: SHIPPED | OUTPATIENT
Start: 2020-07-06 | End: 2021-09-26

## 2020-08-11 ENCOUNTER — VIRTUAL VISIT (OUTPATIENT)
Dept: FAMILY MEDICINE CLINIC | Age: 45
End: 2020-08-11
Payer: COMMERCIAL

## 2020-08-11 DIAGNOSIS — L55.1 SUNBURN, SECOND DEGREE: Primary | ICD-10-CM

## 2020-08-11 PROCEDURE — 99212 OFFICE O/P EST SF 10 MIN: CPT | Performed by: FAMILY MEDICINE

## 2020-08-11 NOTE — LETTER
NOTIFICATION RETURN TO WORK / SCHOOL 
 
8/11/2020 10:48 AM 
 
Mr. Jimmie David [address] To Whom It May Concern: 
 
Jimmie David is currently under the care of Albert Cisneros. He will return to work/school on: 8/12/20. If there are questions or concerns please have the patient contact our office. Sincerely, Kristin Bowen MD

## 2020-08-11 NOTE — PROGRESS NOTES
Elysia Quinn is a 38 y.o. male  HIPAA verified by two patient identifiers. Health Maintenance Due   Topic    Influenza Age 5 to Adult      Chief Complaint   Patient presents with    Sunburn     Patient-Reported Vitals 8/11/2020   Patient-Reported Weight 280lb   Patient-Reported Height 6'3   Patient-Reported Pulse 72   Patient-Reported Temperature 72   Patient-Reported SpO2 98       Pain Scale: 8/10  Pain Location: legs/knees            1. Have you been to the ER, urgent care clinic since your last visit? Hospitalized since your last visit? No    2. Have you seen or consulted any other health care providers outside of the [address] [place] [address] since your last visit? Include any pap smears or colon screening.  No         # [phone]

## 2020-08-11 NOTE — PROGRESS NOTES
**THIS IS A VIRTUAL VISIT VIA A VIDEO SYNCHRONOUS DISCUSSION OVER DOXY. ME PATIENT AGREED TO HAVE THEIR CARE DELIVERED OVER A avox VIDEO VISIT IN PLACE OF THEIR REGULARLY SCHEDULED OFFICE VISIT**       Elysia Quinn is a 38 y.o. male who was seen by synchronous (real-time) audio-video technology on 8/11/2020 for Sunburn        Assessment & Plan:   Diagnoses and all orders for this visit:    1. Sunburn, second degree      The complexity of medical decision making for this visit is moderate         I spent at least 5 minutes on this visit with this established patient. Subjective:     Canoeing on Sunday whole day. Knees sunburned and painful. Also back of shoulders. Sent home from work. Not blistering or peeling yet. Discussed OTC tx options. Prior to Admission medications    Medication Sig Start Date End Date Taking? Authorizing Provider   colchicine 0.6 mg tablet Take 1 Tab by mouth two (2) times a day. 7/6/20   Juana Heredia MD   amphetamine-dextroamphetamine XR (ADDERALL XR) 20 mg XR capsule Take 1 Cap by mouth daily for 30 days. Max Daily Amount: 20 mg. 7/18/20 8/17/20  Juana Heredia MD   hydroCHLOROthiazide (HYDRODIURIL) 25 mg tablet Take 1 Tab by mouth daily. 6/12/19   Juana Heredia MD   telmisartan (MICARDIS) 80 mg tablet Take 1 Tab by mouth daily. 6/12/19   Juana Heredia MD   amLODIPine (NORVASC) 5 mg tablet Take 1 Tab by mouth daily. 6/12/19   Juana Heredia MD     Patient Active Problem List   Diagnosis Code    Essential hypertension I10    Low vitamin D level R79.89    Attention deficit hyperactivity disorder (ADHD) F90.9    Hyperuricemia E79.0    Elevated blood pressure reading in office with diagnosis of hypertension I10    Severe obesity (HCC) E66.01    Prediabetes R73.03     Current Outpatient Medications   Medication Sig Dispense Refill    colchicine 0.6 mg tablet Take 1 Tab by mouth two (2) times a day.  30 Tab 0    amphetamine-dextroamphetamine XR (ADDERALL XR) 20 mg XR capsule Take 1 Cap by mouth daily for 30 days. Max Daily Amount: 20 mg. 30 Cap 0    hydroCHLOROthiazide (HYDRODIURIL) 25 mg tablet Take 1 Tab by mouth daily. 90 Tab 3    telmisartan (MICARDIS) 80 mg tablet Take 1 Tab by mouth daily. 90 Tab 3    amLODIPine (NORVASC) 5 mg tablet Take 1 Tab by mouth daily. 90 Tab 3     No Known Allergies  Past Medical History:   Diagnosis Date    Advance directive discussed with patient 10/22/2015    IBS (irritable bowel syndrome)     Knee pain, acute 10/02/14     prepatellar bursitis Judith Hard 10/09/14    Ruptured tendon 2/2014    left distal biceps tendon (WC injury)     Past Surgical History:   Procedure Laterality Date    HX ORTHOPAEDIC Left 1979    foot injury    [place] EXTRAC ERUPTED TOOTH/EXPOSED ROOT  08/2017    Having teeth pulled and will have more dental work.      Family History   Problem Relation Age of Onset    Cancer Maternal Grandmother      Social History     Tobacco Use    Smoking status: Never Smoker    Smokeless tobacco: Never Used   Substance Use Topics    Alcohol use: Not Currently     Comment: ; weekends       ROS    Objective:     Patient-Reported Vitals 8/11/2020   Patient-Reported Weight 280lb   Patient-Reported Height 6'3   Patient-Reported Pulse 72   Patient-Reported Temperature 72   Patient-Reported SpO2 98        [INSTRUCTIONS:  \"[x]\" Indicates a positive item  \"[]\" Indicates a negative item  -- DELETE ALL ITEMS NOT EXAMINED]    Constitutional: [x] Appears well-developed and well-nourished [x] No apparent distress      [] Abnormal -     Mental status: [x] Alert and awake  [x] Oriented to person/place/time [x] Able to follow commands    [] Abnormal -     Eyes:   EOM    [x]  Normal    [] Abnormal -   Sclera  [x]  Normal    [] Abnormal -          Discharge [x]  None visible   [] Abnormal -     Skin:        [x] No significant exanthematous lesions or discoloration noted on facial skin         [] Abnormal -            Psychiatric:       [x] Normal Affect [] Abnormal -        [x] No Hallucinations    Other pertinent observable physical exam findings:-        We discussed the expected course, resolution and complications of the diagnosis(es) in detail. Medication risks, benefits, costs, interactions, and alternatives were discussed as indicated. I advised him to contact the office if his condition worsens, changes or fails to improve as anticipated. He expressed understanding with the diagnosis(es) and plan. Elysia Quinn, who was evaluated through a patient-initiated, synchronous (real-time) audio-video encounter, and/or his healthcare decision maker, is aware that it is a billable service, with coverage as determined by his insurance carrier. He provided verbal consent to proceed: Yes, and patient identification was verified. It was conducted pursuant to the emergency declaration under the [address] [place] [address], [address] [place] [address] authority and the [employer] General Act. A caregiver was present when appropriate. Ability to conduct physical exam was limited. I was at home. The patient was at home.       Barbara Goldstein MD

## 2020-08-20 ENCOUNTER — PATIENT MESSAGE (OUTPATIENT)
Dept: FAMILY MEDICINE CLINIC | Age: 45
End: 2020-08-20

## 2020-08-21 ENCOUNTER — TELEPHONE (OUTPATIENT)
Dept: FAMILY MEDICINE CLINIC | Age: 45
End: 2020-08-21

## 2020-08-21 NOTE — TELEPHONE ENCOUNTER
The patient said that he was missing a note for work that was suppose to be placed in his my chart. The patient said that a nurse called 2 minutes ago but he could not confirm the name of the nurse that said that his note was there. The patient has 2 recent notes in his chart.  Please call [phone]

## 2021-09-26 ENCOUNTER — APPOINTMENT (OUTPATIENT)
Dept: CT IMAGING | Age: 46
End: 2021-09-26
Attending: EMERGENCY MEDICINE
Payer: COMMERCIAL

## 2021-09-26 ENCOUNTER — HOSPITAL ENCOUNTER (EMERGENCY)
Age: 46
Discharge: HOME | End: 2021-09-26
Attending: EMERGENCY MEDICINE
Payer: COMMERCIAL

## 2021-09-26 ENCOUNTER — APPOINTMENT (OUTPATIENT)
Dept: GENERAL RADIOLOGY | Age: 46
End: 2021-09-26
Attending: EMERGENCY MEDICINE
Payer: COMMERCIAL

## 2021-09-26 VITALS
OXYGEN SATURATION: 100 % | WEIGHT: 287.7 LBS | TEMPERATURE: 98.5 F | HEIGHT: 75 IN | HEART RATE: 78 BPM | RESPIRATION RATE: 14 BRPM | SYSTOLIC BLOOD PRESSURE: 151 MMHG | BODY MASS INDEX: 35.77 KG/M2 | DIASTOLIC BLOOD PRESSURE: 88 MMHG

## 2021-09-26 DIAGNOSIS — I10 HYPERTENSION, UNSPECIFIED TYPE: Primary | ICD-10-CM

## 2021-09-26 DIAGNOSIS — J01.00 ACUTE NON-RECURRENT MAXILLARY SINUSITIS: ICD-10-CM

## 2021-09-26 LAB
ALBUMIN SERPL-MCNC: 3.7 G/DL (ref 3.5–5)
ALBUMIN/GLOB SERPL: 1 {RATIO} (ref 1.1–2.2)
ALP SERPL-CCNC: 90 U/L (ref 45–117)
ALT SERPL-CCNC: 66 U/L (ref 12–78)
ANION GAP SERPL CALC-SCNC: 6 MMOL/L (ref 5–15)
AST SERPL-CCNC: 38 U/L (ref 15–37)
BASOPHILS # BLD: 0 K/UL (ref 0–0.1)
BASOPHILS NFR BLD: 0 % (ref 0–1)
BILIRUB SERPL-MCNC: 0.9 MG/DL (ref 0.2–1)
BUN SERPL-MCNC: 11 MG/DL (ref 6–20)
BUN/CREAT SERPL: 12 (ref 12–20)
CALCIUM SERPL-MCNC: 8.8 MG/DL (ref 8.5–10.1)
CHLORIDE SERPL-SCNC: 104 MMOL/L (ref 97–108)
CO2 SERPL-SCNC: 27 MMOL/L (ref 21–32)
CREAT SERPL-MCNC: 0.95 MG/DL (ref 0.7–1.3)
DIFFERENTIAL METHOD BLD: NORMAL
EOSINOPHIL # BLD: 0.2 K/UL (ref 0–0.4)
EOSINOPHIL NFR BLD: 2 % (ref 0–7)
ERYTHROCYTE [DISTWIDTH] IN BLOOD BY AUTOMATED COUNT: 13.2 % (ref 11.5–14.5)
GLOBULIN SER CALC-MCNC: 3.8 G/DL (ref 2–4)
GLUCOSE SERPL-MCNC: 103 MG/DL (ref 65–100)
HCT VFR BLD AUTO: 41.4 % (ref 36.6–50.3)
HGB BLD-MCNC: 13.7 G/DL (ref 12.1–17)
IMM GRANULOCYTES # BLD AUTO: 0 K/UL (ref 0–0.04)
IMM GRANULOCYTES NFR BLD AUTO: 0 % (ref 0–0.5)
LYMPHOCYTES # BLD: 1.6 K/UL (ref 0.8–3.5)
LYMPHOCYTES NFR BLD: 24 % (ref 12–49)
MCH RBC QN AUTO: 28.6 PG (ref 26–34)
MCHC RBC AUTO-ENTMCNC: 33.1 G/DL (ref 30–36.5)
MCV RBC AUTO: 86.4 FL (ref 80–99)
MONOCYTES # BLD: 0.5 K/UL (ref 0–1)
MONOCYTES NFR BLD: 8 % (ref 5–13)
NEUTS SEG # BLD: 4.5 K/UL (ref 1.8–8)
NEUTS SEG NFR BLD: 66 % (ref 32–75)
NRBC # BLD: 0 K/UL (ref 0–0.01)
NRBC BLD-RTO: 0 PER 100 WBC
PLATELET # BLD AUTO: 263 K/UL (ref 150–400)
PMV BLD AUTO: 9.9 FL (ref 8.9–12.9)
POTASSIUM SERPL-SCNC: 4 MMOL/L (ref 3.5–5.1)
PROT SERPL-MCNC: 7.5 G/DL (ref 6.4–8.2)
RBC # BLD AUTO: 4.79 M/UL (ref 4.1–5.7)
SODIUM SERPL-SCNC: 137 MMOL/L (ref 136–145)
TROPONIN I SERPL-MCNC: <0.05 NG/ML
WBC # BLD AUTO: 6.8 K/UL (ref 4.1–11.1)

## 2021-09-26 PROCEDURE — 71045 X-RAY EXAM CHEST 1 VIEW: CPT

## 2021-09-26 PROCEDURE — 85025 COMPLETE CBC W/AUTO DIFF WBC: CPT

## 2021-09-26 PROCEDURE — 80053 COMPREHEN METABOLIC PANEL: CPT

## 2021-09-26 PROCEDURE — 93005 ELECTROCARDIOGRAM TRACING: CPT

## 2021-09-26 PROCEDURE — 36415 COLL VENOUS BLD VENIPUNCTURE: CPT

## 2021-09-26 PROCEDURE — 70450 CT HEAD/BRAIN W/O DYE: CPT

## 2021-09-26 PROCEDURE — 99284 EMERGENCY DEPT VISIT MOD MDM: CPT

## 2021-09-26 PROCEDURE — 84484 ASSAY OF TROPONIN QUANT: CPT

## 2021-09-26 RX ORDER — AZITHROMYCIN 250 MG/1
TABLET, FILM COATED ORAL
Qty: 6 TABLET | Refills: 0 | Status: SHIPPED | OUTPATIENT
Start: 2021-09-26 | End: 2022-08-21

## 2021-09-26 NOTE — Clinical Note
[address]  [facility] EMERGENCY DEPT  [address] [facility]  [address] [place] [address] [phone]    Work/School Note    Date: 9/26/2021    To Whom It May concern:      Alaina Butt was seen and treated today in the emergency room by the following provider(s):  Attending Provider: Luz Collier Marking is excused from work/school on 09/26/21. He is clear to return to work/school on 09/27/21.         Sincerely,          Arliss Phoenix, DO

## 2021-09-26 NOTE — ED TRIAGE NOTES
3324: Dr. Felisha Regalado at bedside. 0930: Assumed care of pt from triage. Pt is A&O x 4. Pt reports CC of hypertension and headaches. Pt states he has been having high blood pressure for \"a long time\" and the headaches off and on for a few weeks. Pt had his BP checked by [facility] [employer] and it was 216/117 this morning around 0800. Not currently on bp meds due to his PCP retiring. Pt started Lisinopril HCT 20-12.5mg on 9/17/21 by [facility] until he could get into see his new PCP. Denied CP/N/V, some SOB with exertion. Pt resting on stretcher in POC with call bell in reach. Pt placed on monitor x 3. VSS at this time. Pt discharged by Dr. Felisha Regalado. Pt provided with discharge instructions Rx and instructions on follow up care. Pt out of ED ambulatory accompanied by self.

## 2021-09-27 ENCOUNTER — PATIENT OUTREACH (OUTPATIENT)
Dept: CASE MANAGEMENT | Age: 46
End: 2021-09-27

## 2021-09-27 LAB
ATRIAL RATE: 79 BPM
CALCULATED P AXIS, ECG09: 40 DEGREES
CALCULATED R AXIS, ECG10: 58 DEGREES
CALCULATED T AXIS, ECG11: 38 DEGREES
DIAGNOSIS, 93000: NORMAL
P-R INTERVAL, ECG05: 140 MS
Q-T INTERVAL, ECG07: 384 MS
QRS DURATION, ECG06: 106 MS
QTC CALCULATION (BEZET), ECG08: 440 MS
VENTRICULAR RATE, ECG03: 79 BPM

## 2021-09-27 NOTE — PROGRESS NOTES
Ambulatory Care Management Note    Date/Time:  9/27/2021 3:36 PM    This patient was received as a referral from Daily assignment. Ambulatory Care Manager outreached to patient today to offer care management services. Introduction to self and role of care manager provided. Patient accepted care management services at this time. Follow up call scheduled at this time. Patient has Ambulatory Care Manager's contact number for for any questions or concerns.

## 2021-09-27 NOTE — ED PROVIDER NOTES
EMERGENCY DEPARTMENT HISTORY AND PHYSICAL EXAM      Date: 9/26/2021  Patient Name: Alaina Butt    History of Presenting Illness     Chief Complaint   Patient presents with    Headache     Ambulatory into the ED with c/o HA and intermittent \"stabbing\" CP x several weeks. His SBP was 180 - 200 this morning.  Chest Pain    Hypertension       History Provided By: Patient    HPI: Alaina Butt, 54 y.o. male presents to the ED with cc of HTN and headache. Pt has babak and watch that keeps record of BP's. BP's have been elevated he is current on BP medicine that he has been taking it as prescribed. He states today he was at work and complaining of mild dull headache, frontal with no alleviating or exacerbating factors. He does not typically have headaches. He check his BP and it was high and his employer instructed him to come to the ED for evaluation. He denies any CP or SOA. There has been no recent fever/chills, cough or cold symptoms. He denies any abd pain n/v/d. He denies any recent trauma. There are no other complaints, changes, or physical findings at this time. PCP: Unknown, Provider, MD    No current facility-administered medications on file prior to encounter. Current Outpatient Medications on File Prior to Encounter   Medication Sig Dispense Refill    [DISCONTINUED] colchicine 0.6 mg tablet Take 1 Tab by mouth two (2) times a day. 30 Tab 0    [DISCONTINUED] hydroCHLOROthiazide (HYDRODIURIL) 25 mg tablet Take 1 Tab by mouth daily. 90 Tab 3    [DISCONTINUED] telmisartan (MICARDIS) 80 mg tablet Take 1 Tab by mouth daily. 90 Tab 3    [DISCONTINUED] amLODIPine (NORVASC) 5 mg tablet Take 1 Tab by mouth daily.  80 Tab 3       Past History     Past Medical History:  Past Medical History:   Diagnosis Date    Advance directive discussed with patient 10/22/2015    IBS (irritable bowel syndrome)     Knee pain, acute 10/02/14     prepatellar bursitis Baldo Ledesma 10/09/14    Ruptured tendon 2/2014    left distal biceps tendon (WC injury)       Past Surgical History:  Past Surgical History:   Procedure Laterality Date    HX ORTHOPAEDIC Left 1979    foot injury    [place] EXTRAC ERUPTED TOOTH/EXPOSED ROOT  08/2017    Having teeth pulled and will have more dental work. Family History:  Family History   Problem Relation Age of Onset    Cancer Maternal Grandmother        Social History:  Social History     Tobacco Use    Smoking status: Never Smoker    Smokeless tobacco: Never Used   Substance Use Topics    Alcohol use: Not Currently     Comment: ; weekends    Drug use: No       Allergies:  No Known Allergies      Review of Systems   Review of Systems   Constitutional: Negative. Negative for appetite change, chills, fatigue and fever. HENT: Negative. Negative for congestion, rhinorrhea, sinus pressure and sore throat. Eyes: Negative. Respiratory: Negative. Negative for cough, choking, chest tightness, shortness of breath and wheezing. Cardiovascular: Negative for chest pain, palpitations and leg swelling. Elevated BP   Gastrointestinal: Negative for abdominal pain, constipation, diarrhea, nausea and vomiting. Endocrine: Negative. Genitourinary: Negative. Negative for difficulty urinating, dysuria, flank pain and urgency. Musculoskeletal: Negative. Skin: Negative. Neurological: Positive for headaches. Negative for dizziness, speech difficulty, weakness, light-headedness and numbness. Psychiatric/Behavioral: Negative. All other systems reviewed and are negative. Physical Exam   Physical Exam  Vitals and nursing note reviewed. Constitutional:       General: He is not in acute distress. Appearance: He is well-developed. He is not diaphoretic. HENT:      Head: Normocephalic and atraumatic. Mouth/Throat:      Pharynx: No oropharyngeal exudate.    Eyes:      Conjunctiva/sclera: Conjunctivae normal.      Pupils: Pupils are equal, round, and reactive to light. Neck:      Vascular: No JVD. Trachea: No tracheal deviation. Cardiovascular:      Rate and Rhythm: Normal rate and regular rhythm. Heart sounds: Normal heart sounds. No murmur heard. Pulmonary:      Effort: Pulmonary effort is normal. No respiratory distress. Breath sounds: Normal breath sounds. No stridor. No wheezing or rales. Abdominal:      General: There is no distension. Palpations: Abdomen is soft. Comments: No distension     Musculoskeletal:         General: No tenderness. Normal range of motion. Cervical back: Normal range of motion and neck supple. Right lower leg: No edema. Left lower leg: No edema. Skin:     General: Skin is warm and dry. Capillary Refill: Capillary refill takes less than 2 seconds. Neurological:      Mental Status: He is alert and oriented to person, place, and time. Cranial Nerves: No cranial nerve deficit.       Comments: No gross motor or sensory deficits    Psychiatric:         Mood and Affect: Mood normal.         Behavior: Behavior normal.         Diagnostic Study Results     Labs -     Recent Results (from the past 12 hour(s))   EKG, 12 LEAD, INITIAL    Collection Time: 09/26/21  9:20 AM   Result Value Ref Range    Ventricular Rate 79 BPM    Atrial Rate 79 BPM    P-R Interval 140 ms    QRS Duration 106 ms    Q-T Interval 384 ms    QTC Calculation (Bezet) 440 ms    Calculated P Axis 40 degrees    Calculated R Axis 58 degrees    Calculated T Axis 38 degrees    Diagnosis       Sinus rhythm with occasional premature ventricular complexes  No previous ECGs available     CBC WITH AUTOMATED DIFF    Collection Time: 09/26/21  9:57 AM   Result Value Ref Range    WBC 6.8 4.1 - 11.1 K/uL    RBC 4.79 4.10 - 5.70 M/uL    HGB 13.7 12.1 - 17.0 g/dL    HCT 41.4 36.6 - 50.3 %    MCV 86.4 80.0 - 99.0 FL    MCH 28.6 26.0 - 34.0 PG    MCHC 33.1 30.0 - 36.5 g/dL    RDW 13.2 11.5 - 14.5 %    PLATELET 715 300 - 990 K/uL    MPV 9.9 8.9 - 12.9 FL    NRBC 0.0 0  WBC    ABSOLUTE NRBC 0.00 0.00 - 0.01 K/uL    NEUTROPHILS 66 32 - 75 %    LYMPHOCYTES 24 12 - 49 %    MONOCYTES 8 5 - 13 %    EOSINOPHILS 2 0 - 7 %    BASOPHILS 0 0 - 1 %    IMMATURE GRANULOCYTES 0 0.0 - 0.5 %    ABS. NEUTROPHILS 4.5 1.8 - 8.0 K/UL    ABS. LYMPHOCYTES 1.6 0.8 - 3.5 K/UL    ABS. MONOCYTES 0.5 0.0 - 1.0 K/UL    ABS. EOSINOPHILS 0.2 0.0 - 0.4 K/UL    ABS. BASOPHILS 0.0 0.0 - 0.1 K/UL    ABS. IMM. GRANS. 0.0 0.00 - 0.04 K/UL    DF AUTOMATED     METABOLIC PANEL, COMPREHENSIVE    Collection Time: 09/26/21  9:57 AM   Result Value Ref Range    Sodium 137 136 - 145 mmol/L    Potassium 4.0 3.5 - 5.1 mmol/L    Chloride 104 97 - 108 mmol/L    CO2 27 21 - 32 mmol/L    Anion gap 6 5 - 15 mmol/L    Glucose 103 (H) 65 - 100 mg/dL    BUN 11 6 - 20 MG/DL    Creatinine 0.95 0.70 - 1.30 MG/DL    BUN/Creatinine ratio 12 12 - 20      GFR est AA >60 >60 ml/min/1.73m2    GFR est non-AA >60 >60 ml/min/1.73m2    Calcium 8.8 8.5 - 10.1 MG/DL    Bilirubin, total 0.9 0.2 - 1.0 MG/DL    ALT (SGPT) 66 12 - 78 U/L    AST (SGOT) 38 (H) 15 - 37 U/L    Alk. phosphatase 90 45 - 117 U/L    Protein, total 7.5 6.4 - 8.2 g/dL    Albumin 3.7 3.5 - 5.0 g/dL    Globulin 3.8 2.0 - 4.0 g/dL    A-G Ratio 1.0 (L) 1.1 - 2.2     TROPONIN I    Collection Time: 09/26/21  9:57 AM   Result Value Ref Range    Troponin-I, Qt. <0.05 <0.05 ng/mL       Radiologic Studies -   CT HEAD WO CONT   Final Result      No acute intracranial abnormality on this noncontrast head CT. Left maxillary sinusitis. XR CHEST PORT   Final Result      No acute process on portable chest. No change. CT Results  (Last 48 hours)               09/26/21 1021  CT HEAD WO CONT Final result    Impression:      No acute intracranial abnormality on this noncontrast head CT. Left maxillary sinusitis. Narrative:  EXAM: CT HEAD WO CONT       INDICATION: Headache, hypertension.        COMPARISON: None       TECHNIQUE: Noncontrast head CT. Coronal and sagittal reformats. CT dose   reduction was achieved through the use of a standardized protocol tailored for   this examination and automatic exposure control for dose modulation. FINDINGS: The ventricles and sulci are age-appropriate without hydrocephalus. There is no mass effect or midline shift. There is no intracranial hemorrhage or   extra-axial fluid collection. There is no abnormal area of decreased density to   suggest infarct. The calvarium is intact. Opacified left maxillary sinus is partially imaged. CXR Results  (Last 48 hours)               09/26/21 0933  XR CHEST PORT Final result    Impression:      No acute process on portable chest. No change. Narrative:  EXAM: XR CHEST PORT       INDICATION: Chest pain, headache, and hypertension. COMPARISON: Chest views on 1/29/2016. TECHNIQUE: Upright portable chest AP view       FINDINGS: Cardiac monitoring wires overlie the thorax. The cardiomediastinal and   hilar contours are within normal limits. The pulmonary vasculature is within   normal limits. The lungs and pleural spaces are clear. The visualized bones and upper abdomen   are age-appropriate. Medical Decision Making   I am the first provider for this patient. I reviewed the vital signs, available nursing notes, past medical history, past surgical history, family history and social history. Vital Signs-Reviewed the patient's vital signs.   Patient Vitals for the past 12 hrs:   Temp Pulse Resp BP SpO2   09/26/21 1215 -- 78 14 (!) 151/88 --   09/26/21 0950 -- -- -- -- 100 %   09/26/21 0915 98.5 °F (36.9 °C) 72 12 (!) 169/99 97 %       EKG interpretation: (Preliminary)  NSR, rate 79, normal axis/pr/qrs, no acute ST changes, Rip Mussel, DO      Records Reviewed: Nursing Notes, Old Medical Records, Previous Radiology Studies, Previous Laboratory Studies and No recent ED visits, previous ortho visits right knee pain with PT    Provider Notes (Medical Decision Making):   DDx- Essential HTN, CKD, ACS, ICH, sinusitis, tension headache    ED Course:   Initial assessment performed. The patients presenting problems have been discussed, and they are in agreement with the care plan formulated and outlined with them. I have encouraged them to ask questions as they arise throughout their visit. Pt BP has not been significantly elevated throughout ED visit. Will not make any changes in HTN meds. He has an appt with a new PCP in 2 weeks. Discussed keeping diary of BP's, discussed making sure he has been sitting down at least 15 minutes before checking his BP. CT head- sinusitis, this is the likely source of head will place pt on course of Ab. Disposition:  DC home, follow-up with PCP. DISCHARGE PLAN:  1. Discharge Medication List as of 9/26/2021 12:26 PM        2. Follow-up Information     Follow up With Specialties Details Why Contact Info    Unknown, Provider, MD    Patient not available to ask          3. Return to ED if worse     Diagnosis     Clinical Impression:   1. Hypertension, unspecified type    2. Acute non-recurrent maxillary sinusitis        Attestations:    Robin Street, DO    Please note that this dictation was completed with [employer], the computer voice recognition software. Quite often unanticipated grammatical, syntax, homophones, and other interpretive errors are inadvertently transcribed by the computer software. Please disregard these errors. Please excuse any errors that have escaped final proofreading. Thank you.

## 2021-10-11 ENCOUNTER — PATIENT OUTREACH (OUTPATIENT)
Dept: CASE MANAGEMENT | Age: 46
End: 2021-10-11

## 2021-10-11 NOTE — PROGRESS NOTES
Ambulatory Care Management Note    Date/Time:  10/11/2021 12:20 PM    This Ambulatory Care Manager (ACM) reviewed and updated the following screenings during this call; self management assessment    Patient's challenges to self management identified:   ineffective coping and lack of knowledge about disease      Medication Management:  poor adherence and poor understanding    Advance Care Planning:   Does patient have an Advance Directive:  currently not on file; education provided     [employer], Referrals, and Durable Medical Equipment: n/a      Health Maintenance Due   Topic Date Due    Hepatitis C Screening  Never done    COVID-19 Vaccine (1) Never done    Colorectal Cancer Screening Combo  Never done    A1C test (Diabetic or Prediabetic)  03/09/2021    Flu Vaccine (1) 09/01/2021     Health Maintenance reviewed - Flu and COVID vaccine. Patient was asked to consider health care goals that they would like to focus on with this ACM. ACM will follow up with patient to discuss goals and establish care plan in the next 7-14 days. PCP/Specialist follow up: No future appointments.

## 2021-11-09 ENCOUNTER — PATIENT OUTREACH (OUTPATIENT)
Dept: CASE MANAGEMENT | Age: 46
End: 2021-11-09

## 2022-03-03 ENCOUNTER — HOSPITAL ENCOUNTER (EMERGENCY)
Age: 47
Discharge: HOME | End: 2022-03-03
Attending: EMERGENCY MEDICINE
Payer: COMMERCIAL

## 2022-03-03 VITALS
RESPIRATION RATE: 18 BRPM | DIASTOLIC BLOOD PRESSURE: 117 MMHG | HEART RATE: 82 BPM | TEMPERATURE: 97.9 F | HEIGHT: 74 IN | SYSTOLIC BLOOD PRESSURE: 183 MMHG | BODY MASS INDEX: 35.93 KG/M2 | WEIGHT: 279.98 LBS | OXYGEN SATURATION: 100 %

## 2022-03-03 DIAGNOSIS — M79.672 ACUTE FOOT PAIN, LEFT: Primary | ICD-10-CM

## 2022-03-03 DIAGNOSIS — M10.9 ACUTE GOUT INVOLVING TOE OF LEFT FOOT, UNSPECIFIED CAUSE: ICD-10-CM

## 2022-03-03 PROCEDURE — 74011250636 HC RX REV CODE- 250/636: Performed by: EMERGENCY MEDICINE

## 2022-03-03 PROCEDURE — 96372 THER/PROPH/DIAG INJ SC/IM: CPT

## 2022-03-03 PROCEDURE — 74011250637 HC RX REV CODE- 250/637: Performed by: EMERGENCY MEDICINE

## 2022-03-03 PROCEDURE — 74011636637 HC RX REV CODE- 636/637: Performed by: EMERGENCY MEDICINE

## 2022-03-03 PROCEDURE — 99284 EMERGENCY DEPT VISIT MOD MDM: CPT

## 2022-03-03 RX ORDER — PREDNISONE 10 MG/1
TABLET ORAL
Qty: 48 TABLET | Refills: 0 | Status: SHIPPED | OUTPATIENT
Start: 2022-03-03 | End: 2022-08-21

## 2022-03-03 RX ORDER — NAPROXEN 500 MG/1
500 TABLET ORAL 2 TIMES DAILY WITH MEALS
Qty: 20 TABLET | Refills: 0 | Status: SHIPPED | OUTPATIENT
Start: 2022-03-03 | End: 2022-03-13

## 2022-03-03 RX ORDER — KETOROLAC TROMETHAMINE 30 MG/ML
60 INJECTION, SOLUTION INTRAMUSCULAR; INTRAVENOUS
Status: COMPLETED | OUTPATIENT
Start: 2022-03-03 | End: 2022-03-03

## 2022-03-03 RX ORDER — PREDNISONE 20 MG/1
60 TABLET ORAL
Status: COMPLETED | OUTPATIENT
Start: 2022-03-03 | End: 2022-03-03

## 2022-03-03 RX ORDER — COLCHICINE 0.6 MG/1
0.6 TABLET ORAL 2 TIMES DAILY
Qty: 14 TABLET | Refills: 0 | Status: SHIPPED | OUTPATIENT
Start: 2022-03-03 | End: 2022-03-10

## 2022-03-03 RX ORDER — TRAZODONE HYDROCHLORIDE 50 MG/1
50 TABLET ORAL
COMMUNITY

## 2022-03-03 RX ORDER — CHLORTHALIDONE 25 MG/1
50 TABLET ORAL DAILY
COMMUNITY

## 2022-03-03 RX ORDER — LISINOPRIL AND HYDROCHLOROTHIAZIDE 12.5; 2 MG/1; MG/1
1 TABLET ORAL DAILY
COMMUNITY
End: 2022-08-21

## 2022-03-03 RX ORDER — DEXTROAMPHETAMINE SACCHARATE, AMPHETAMINE ASPARTATE MONOHYDRATE, DEXTROAMPHETAMINE SULFATE AND AMPHETAMINE SULFATE 5; 5; 5; 5 MG/1; MG/1; MG/1; MG/1
20 CAPSULE, EXTENDED RELEASE ORAL DAILY
COMMUNITY
Start: 2021-11-26

## 2022-03-03 RX ORDER — COLCHICINE 0.6 MG/1
1.2 TABLET ORAL
Status: COMPLETED | OUTPATIENT
Start: 2022-03-03 | End: 2022-03-03

## 2022-03-03 RX ADMIN — COLCHICINE 1.2 MG: 0.6 TABLET, FILM COATED ORAL at 05:44

## 2022-03-03 RX ADMIN — PREDNISONE 60 MG: 20 TABLET ORAL at 05:44

## 2022-03-03 RX ADMIN — KETOROLAC TROMETHAMINE 60 MG: 30 INJECTION, SOLUTION INTRAMUSCULAR; INTRAVENOUS at 05:44

## 2022-03-03 NOTE — DISCHARGE INSTRUCTIONS
It was a pleasure taking care of you in our Emergency Department today. We know [facility] you are entrusting us with your health, comfort, and safety. Our physicians and nurses honor that trust, and truly appreciate the opportunity to care for you and your loved ones. We also value your feedback. If you receive a survey about your Emergency Department experience today, please fill it out. We care about our patients' feedback, and we listen to what you have to say. Thank you!       Dr. Hi Bob MD.

## 2022-03-03 NOTE — ED PROVIDER NOTES
EMERGENCY DEPARTMENT HISTORY AND PHYSICAL EXAM     ------------------------------------------------------------------------------------------------------  Please note that this dictation was completed with [employer], the [employer] voice recognition software. Quite often unanticipated grammatical, syntax, homophones, and other interpretive errors are inadvertently transcribed by the computer software. Please disregard these errors. Please excuse any errors that have escaped final proofreading.  -----------------------------------------------------------------------------------------------------------------    Date: 3/3/2022  Patient Name: Alexandrea Martel    History of Presenting Illness     Chief Complaint   Patient presents with    Foot Pain     ED visit d/t (L) foot pain - onset of sxs, this AM - hx of gout to (R) foot yet no active medications at this time - Denies fevers / coughing / cp / surgeries to the affected site / chills;;       History Provided By: Patient    HPI: Alexandrea Martel is a 51 y.o. male, with significant pmhx of gout to L foot in remote past, ADHD, HTN, who presents via private vehicle to the ED with c/o 1 day of L great toe pain and redness. Notes similar sx in past with gout flare many years ago. Did not take ANY medication at home to help with his pain. Is able to bear weight on L foot, no decreased sensation or loss of motor function. Pt also specifically denies any recent fevers, chills, CP, SOB, nausea, vomiting, diarrhea, abd pain, changes in BM, urinary sxs, or headache. PCP: Unknown, Provider, DPM    Social Hx: denies tobacco, denies EtOH, denies recreational/ Illicit Drugs     There are no other complaints, changes, or physical findings at this time.      No Known Allergies      Current Facility-Administered Medications   Medication Dose Route Frequency Provider Last Rate Last Admin    ketorolac (TORADOL) injection 60 mg  60 mg IntraMUSCular NOW Lula Rodgers MD       [facility] predniSONE (DELTASONE) tablet 60 mg  60 mg Oral NOW Tai Lord MD        colchicine tablet 1.2 mg  1.2 mg Oral NOW Tai Lord MD         Current Outpatient Medications   Medication Sig Dispense Refill    lisinopril-hydroCHLOROthiazide (PRINZIDE, ZESTORETIC) 20-12.5 mg per tablet Take 1 Tablet by mouth daily.  chlorthalidone (HYGROTON) 25 mg tablet Take 25 mg by mouth daily.  traZODone (DESYREL) 50 mg tablet Take 50 mg by mouth nightly.  predniSONE (STERAPRED DS) 10 mg dose pack Take as directed on packaging 48 Tablet 0    colchicine 0.6 mg tablet Take 1 Tablet by mouth two (2) times a day for 7 days. 14 Tablet 0    naproxen (Naprosyn) 500 mg tablet Take 1 Tablet by mouth two (2) times daily (with meals) for 10 days. 20 Tablet 0    amlodipine besylate (AMLODIPINE PO) Take  by mouth.  amphetamine-dextroamphetamine XR (ADDERALL XR) 20 mg XR capsule Take 20 mg by mouth daily.  HYDROCHLOROTHIAZIDE PO Take  by mouth.  azithromycin (ZITHROMAX) 250 mg tablet Take 2 tabs on day 1, then 1 tab a day for 4 days 6 Tablet 0       Past History     Past Medical History:  Past Medical History:   Diagnosis Date    Advance directive discussed with patient 10/22/2015    IBS (irritable bowel syndrome)     Knee pain, acute 10/02/14     prepatellar bursitis Satira Sharyn 10/09/14    Ruptured tendon 2/2014    left distal biceps tendon (WC injury)       Past Surgical History:  Past Surgical History:   Procedure Laterality Date    HX ORTHOPAEDIC Left 1979    foot injury    [place] EXTRAC ERUPTED TOOTH/EXPOSED ROOT  08/2017    Having teeth pulled and will have more dental work. Family History:  Family History   Problem Relation Age of Onset    Cancer Maternal Grandmother        Social History:  Social History     Tobacco Use    Smoking status: Never Smoker    Smokeless tobacco: Never Used   Substance Use Topics    Alcohol use: Not Currently     Comment: ; weekends    Drug use:  No Allergies:  No Known Allergies      Review of Systems   Review of Systems   Constitutional: Negative for chills and fever. HENT: Negative. Eyes: Negative. Respiratory: Negative for cough, chest tightness and shortness of breath. Cardiovascular: Negative for chest pain and leg swelling. Gastrointestinal: Negative for abdominal pain, diarrhea, nausea and vomiting. Endocrine: Negative. Genitourinary: Negative for difficulty urinating and dysuria. Musculoskeletal: Positive for arthralgias (L foot). Negative for myalgias. Skin: Negative. Neurological: Negative. Psychiatric/Behavioral: Negative. All other systems reviewed and are negative. Physical Exam   Physical Exam  Vitals and nursing note reviewed. Constitutional:       General: He is not in acute distress. Appearance: He is well-developed. He is not diaphoretic. HENT:      Head: Normocephalic and atraumatic. Nose: Nose normal.      Mouth/Throat:      Pharynx: No oropharyngeal exudate. Eyes:      Conjunctiva/sclera: Conjunctivae normal.      Pupils: Pupils are equal, round, and reactive to light. Neck:      Vascular: No JVD. Cardiovascular:      Rate and Rhythm: Normal rate and regular rhythm. Heart sounds: Normal heart sounds. No murmur heard. No friction rub. Pulmonary:      Effort: Pulmonary effort is normal. No respiratory distress. Breath sounds: Normal breath sounds. No stridor. No wheezing or rales. Abdominal:      General: Bowel sounds are normal. There is no distension. Palpations: Abdomen is soft. Tenderness: There is no abdominal tenderness. There is no rebound. Musculoskeletal:         General: Normal range of motion. Cervical back: Normal range of motion and neck supple. Left foot: Tenderness and bony tenderness present. Legs:    Skin:     General: Skin is warm and dry. Findings: No rash.    Neurological:      Mental Status: He is alert and oriented to person, place, and time. Cranial Nerves: No cranial nerve deficit. Psychiatric:         Speech: Speech normal.         Behavior: Behavior normal.         Thought Content: Thought content normal.         Judgment: Judgment normal.           Diagnostic Study Results     Labs -   No results found for this or any previous visit (from the past 12 hour(s)). Radiologic Studies -   No orders to display     CT Results  (Last 48 hours)    None        CXR Results  (Last 48 hours)    None            Medical Decision Making   I am the first provider for this patient. I reviewed the vital signs, available nursing notes, past medical history, past surgical history, family history and social history. Vital Signs-Reviewed the patient's vital signs. Patient Vitals for the past 12 hrs:   Temp Pulse Resp BP SpO2   03/03/22 0453 97.9 °F (36.6 °C) 82 18 (!) 183/117 100 %       Pulse Oximetry Analysis - 100% on RA Normal    Records Reviewed/Interpretted: Nursing Notes from triage and Old Medical Records, noting previous ED visit for gout in 2020    Provider Notes (Medical Decision Making):     DDX:  Gout flare, arthritis    Plan:  analgseic    Impression:  Gout flare    ED Course:   Initial assessment performed. The patients presenting problems have been discussed, and they are in agreement with the care plan formulated and outlined with them. I have encouraged them to ask questions as they arise throughout their visit. I reviewed our electronic medical record system for any past medical records that were available that may contribute to the patients current condition, the nursing notes and and vital signs from today's visit  Nursing notes will be reviewed as they become available in realtime while the pt has been in the ED. Eduard Cifuentes MD        5:34 AM   Progress note:  Pt noted to be feeling better, ready for discharge. Pt will follow up with podiatry as instructed.  All questions have been answered, pt voiced understanding and agreement with plan.  , pt advised that diarrhea and rash are possible side effects of the medications. Specific return precautions provided in addition to instructions for pt to return to the ED immediately should sx worsen at any time. Selene Hunt MD             Critical Care Time:     none      Diagnosis     Clinical Impression:   1. Acute foot pain, left    2. Acute gout involving toe of left foot, unspecified cause        PLAN:  1. Current Discharge Medication List      START taking these medications    Details   predniSONE (STERAPRED DS) 10 mg dose pack Take as directed on packaging  Qty: 48 Tablet, Refills: 0  Start date: 3/3/2022      colchicine 0.6 mg tablet Take 1 Tablet by mouth two (2) times a day for 7 days. Qty: 14 Tablet, Refills: 0  Start date: 3/3/2022, End date: 3/10/2022      naproxen (Naprosyn) 500 mg tablet Take 1 Tablet by mouth two (2) times daily (with meals) for 10 days. Qty: 20 Tablet, Refills: 0  Start date: 3/3/2022, End date: 3/13/2022           2. Follow-up Information     Follow up With Specialties Details Why Contact Info    [facility] EMERGENCY DEPT Emergency Medicine  As needed [address] [place] [address]  [phone]    [facility] Surgery Schedule an appointment as soon as possible for a visit in 2 days  [address] [place] [address]  [phone]          Return to ED if worse     Disposition:    5:35 AM    The patient's results have been reviewed with family and/or caregiver. They verbally convey their understanding and agreement of the patient's signs, symptoms, diagnosis, treatment and prognosis and additionally agree to follow up as recommended in the discharge instructions or to return to the Emergency Room should the patient's condition change prior to their follow-up appointment.  The family and/or caregiver verbally agrees with the care-plan and all of their questions have been answered. The discharge instructions have also been provided to the them with educational information regarding the patient's diagnosis as well a list of reasons why the patient would want to return to the ER prior to their follow-up appointment should their condition change.   Kim Martin MD

## 2022-03-18 PROBLEM — I10 ELEVATED BLOOD PRESSURE READING IN OFFICE WITH DIAGNOSIS OF HYPERTENSION: Status: ACTIVE | Noted: 2017-12-20

## 2022-03-18 PROBLEM — E79.0 HYPERURICEMIA: Status: ACTIVE | Noted: 2017-12-20

## 2022-03-18 PROBLEM — R79.89 LOW VITAMIN D LEVEL: Status: ACTIVE | Noted: 2017-09-05

## 2022-03-19 PROBLEM — F90.9 ATTENTION DEFICIT HYPERACTIVITY DISORDER (ADHD): Status: ACTIVE | Noted: 2017-12-20

## 2022-03-19 PROBLEM — E66.01 SEVERE OBESITY (HCC): Status: ACTIVE | Noted: 2019-06-05

## 2022-03-19 PROBLEM — R73.03 PREDIABETES: Status: ACTIVE | Noted: 2020-03-09

## 2022-03-23 ENCOUNTER — APPOINTMENT (OUTPATIENT)
Dept: GENERAL RADIOLOGY | Age: 47
End: 2022-03-23
Attending: EMERGENCY MEDICINE
Payer: COMMERCIAL

## 2022-03-23 ENCOUNTER — HOSPITAL ENCOUNTER (EMERGENCY)
Age: 47
Discharge: HOME | End: 2022-03-23
Attending: EMERGENCY MEDICINE
Payer: COMMERCIAL

## 2022-03-23 VITALS
SYSTOLIC BLOOD PRESSURE: 184 MMHG | RESPIRATION RATE: 20 BRPM | HEIGHT: 75 IN | DIASTOLIC BLOOD PRESSURE: 100 MMHG | HEART RATE: 74 BPM | OXYGEN SATURATION: 96 % | TEMPERATURE: 98.1 F | BODY MASS INDEX: 33.82 KG/M2 | WEIGHT: 272 LBS

## 2022-03-23 DIAGNOSIS — R00.0 TACHYCARDIA: Primary | ICD-10-CM

## 2022-03-23 DIAGNOSIS — I10 HYPERTENSION, UNSPECIFIED TYPE: ICD-10-CM

## 2022-03-23 DIAGNOSIS — I49.3 PVC (PREMATURE VENTRICULAR CONTRACTION): ICD-10-CM

## 2022-03-23 DIAGNOSIS — R42 DIZZINESS: ICD-10-CM

## 2022-03-23 LAB
ALBUMIN SERPL-MCNC: 3.9 G/DL (ref 3.5–5)
ALBUMIN/GLOB SERPL: 1.1 {RATIO} (ref 1.1–2.2)
ALP SERPL-CCNC: 96 U/L (ref 45–117)
ALT SERPL-CCNC: 51 U/L (ref 12–78)
ANION GAP SERPL CALC-SCNC: 2 MMOL/L (ref 5–15)
AST SERPL-CCNC: 28 U/L (ref 15–37)
ATRIAL RATE: 78 BPM
BASOPHILS # BLD: 0 K/UL (ref 0–0.1)
BASOPHILS NFR BLD: 1 % (ref 0–1)
BILIRUB SERPL-MCNC: 0.4 MG/DL (ref 0.2–1)
BNP SERPL-MCNC: 67 PG/ML
BUN SERPL-MCNC: 17 MG/DL (ref 6–20)
BUN/CREAT SERPL: 15 (ref 12–20)
CALCIUM SERPL-MCNC: 9.1 MG/DL (ref 8.5–10.1)
CALCULATED P AXIS, ECG09: 69 DEGREES
CALCULATED R AXIS, ECG10: 25 DEGREES
CALCULATED T AXIS, ECG11: 29 DEGREES
CHLORIDE SERPL-SCNC: 105 MMOL/L (ref 97–108)
CO2 SERPL-SCNC: 31 MMOL/L (ref 21–32)
CREAT SERPL-MCNC: 1.12 MG/DL (ref 0.7–1.3)
DIAGNOSIS, 93000: NORMAL
DIFFERENTIAL METHOD BLD: NORMAL
EOSINOPHIL # BLD: 0.1 K/UL (ref 0–0.4)
EOSINOPHIL NFR BLD: 2 % (ref 0–7)
ERYTHROCYTE [DISTWIDTH] IN BLOOD BY AUTOMATED COUNT: 13.2 % (ref 11.5–14.5)
GLOBULIN SER CALC-MCNC: 3.6 G/DL (ref 2–4)
GLUCOSE SERPL-MCNC: 110 MG/DL (ref 65–100)
HCT VFR BLD AUTO: 41.3 % (ref 36.6–50.3)
HGB BLD-MCNC: 14.1 G/DL (ref 12.1–17)
IMM GRANULOCYTES # BLD AUTO: 0 K/UL (ref 0–0.04)
IMM GRANULOCYTES NFR BLD AUTO: 0 % (ref 0–0.5)
LYMPHOCYTES # BLD: 2.1 K/UL (ref 0.8–3.5)
LYMPHOCYTES NFR BLD: 26 % (ref 12–49)
MCH RBC QN AUTO: 27.9 PG (ref 26–34)
MCHC RBC AUTO-ENTMCNC: 34.1 G/DL (ref 30–36.5)
MCV RBC AUTO: 81.8 FL (ref 80–99)
MONOCYTES # BLD: 0.6 K/UL (ref 0–1)
MONOCYTES NFR BLD: 8 % (ref 5–13)
NEUTS SEG # BLD: 5.2 K/UL (ref 1.8–8)
NEUTS SEG NFR BLD: 63 % (ref 32–75)
NRBC # BLD: 0 K/UL (ref 0–0.01)
NRBC BLD-RTO: 0 PER 100 WBC
P-R INTERVAL, ECG05: 136 MS
PLATELET # BLD AUTO: 275 K/UL (ref 150–400)
PMV BLD AUTO: 9.7 FL (ref 8.9–12.9)
POTASSIUM SERPL-SCNC: 4.2 MMOL/L (ref 3.5–5.1)
PROT SERPL-MCNC: 7.5 G/DL (ref 6.4–8.2)
Q-T INTERVAL, ECG07: 380 MS
QRS DURATION, ECG06: 102 MS
QTC CALCULATION (BEZET), ECG08: 433 MS
RBC # BLD AUTO: 5.05 M/UL (ref 4.1–5.7)
SODIUM SERPL-SCNC: 138 MMOL/L (ref 136–145)
TROPONIN-HIGH SENSITIVITY: 7 NG/L (ref 0–76)
TROPONIN-HIGH SENSITIVITY: 9 NG/L (ref 0–76)
VENTRICULAR RATE, ECG03: 78 BPM
WBC # BLD AUTO: 8.2 K/UL (ref 4.1–11.1)

## 2022-03-23 PROCEDURE — 83880 ASSAY OF NATRIURETIC PEPTIDE: CPT

## 2022-03-23 PROCEDURE — 36415 COLL VENOUS BLD VENIPUNCTURE: CPT

## 2022-03-23 PROCEDURE — 84484 ASSAY OF TROPONIN QUANT: CPT

## 2022-03-23 PROCEDURE — 93005 ELECTROCARDIOGRAM TRACING: CPT

## 2022-03-23 PROCEDURE — 71046 X-RAY EXAM CHEST 2 VIEWS: CPT

## 2022-03-23 PROCEDURE — 85025 COMPLETE CBC W/AUTO DIFF WBC: CPT

## 2022-03-23 PROCEDURE — 99285 EMERGENCY DEPT VISIT HI MDM: CPT

## 2022-03-23 PROCEDURE — 80053 COMPREHEN METABOLIC PANEL: CPT

## 2022-03-23 NOTE — DISCHARGE INSTRUCTIONS
Keep a log of your blood pressures at home to discuss with your cardiologist on your follow-up visit. Continue to take as prescribed.

## 2022-03-23 NOTE — ED PROVIDER NOTES
EMERGENCY DEPARTMENT HISTORY AND PHYSICAL EXAM      Date: 3/23/2022  Patient Name: Jesus Guerrier  Patient Age and Sex: 51 y.o. male     History of Presenting Illness     Chief Complaint   Patient presents with    Irregular Heart Beat     pt sent over for evaluation by [facility] where pt went to be evaluated for BP and rapid hr. At the station, they found HTN and HR in the 120-130 range. Pt was on a heart monitor per Dr Nas Nolen until this past monday       History Provided By: Patient    HPI: Jesus Guerrier is a 51year old history HTN presenting with headache, dizziness, and fatigue. He reports while sitting at work, he developed headache, dizziness, and fatigue. He checked his HR and found it to be elevated, he was sent to the fire department, where his HR was found to be 130 and he was advised to be evaluated. Headache is persistent but mild. Dizziness is also persistent but very mild, able to ambulate without difficulty. Chest pain, no palpitations. Patient has been evaluated by Dr Nas Nolen of cardiology 2/13, antihypertensives adjusted. Echo scheduled 3/31    There are no other complaints, changes, or physical findings at this time. PCP: Unknown, Provider, DPM    No current facility-administered medications on file prior to encounter. Current Outpatient Medications on File Prior to Encounter   Medication Sig Dispense Refill    lisinopril-hydroCHLOROthiazide (PRINZIDE, ZESTORETIC) 20-12.5 mg per tablet Take 1 Tablet by mouth daily.  chlorthalidone (HYGROTON) 25 mg tablet Take 25 mg by mouth daily.  traZODone (DESYREL) 50 mg tablet Take 50 mg by mouth nightly.  amphetamine-dextroamphetamine XR (ADDERALL XR) 20 mg XR capsule Take 20 mg by mouth daily.  predniSONE (STERAPRED DS) 10 mg dose pack Take as directed on packaging 48 Tablet 0    amlodipine besylate (AMLODIPINE PO) Take  by mouth.  HYDROCHLOROTHIAZIDE PO Take  by mouth.       azithromycin (ZITHROMAX) 250 mg tablet Take 2 tabs on day 1, then 1 tab a day for 4 days 6 Tablet 0       Past History   Past Medical History:  Past Medical History:   Diagnosis Date    Advance directive discussed with patient 10/22/2015    IBS (irritable bowel syndrome)     Knee pain, acute 10/02/14     prepatellar bursitis Rudy Zayas 10/09/14    Ruptured tendon 2/2014    left distal biceps tendon ([facility] injury)     Past Surgical History:  Past Surgical History:   Procedure Laterality Date    HX ORTHOPAEDIC Left 1979    foot injury    [place] EXTRAC ERUPTED TOOTH/EXPOSED ROOT  08/2017    Having teeth pulled and will have more dental work. Family History:  Family History   Problem Relation Age of Onset    Cancer Maternal Grandmother      Social History:  Social History     Tobacco Use    Smoking status: Never Smoker    Smokeless tobacco: Never Used   Substance Use Topics    Alcohol use: Not Currently     Comment: ; weekends    Drug use: No       Allergies:  No Known Allergies      Review of Systems   Review of Systems   Constitutional: Positive for fatigue. Negative for chills and fever. HENT: Negative for congestion and rhinorrhea. Respiratory: Negative for shortness of breath. Cardiovascular: Positive for palpitations. Negative for chest pain. Gastrointestinal: Negative for abdominal pain, diarrhea, nausea and vomiting. Genitourinary: Negative for dysuria and frequency. Musculoskeletal: Negative for myalgias. Skin: Negative for rash. Neurological: Positive for dizziness and headaches. Negative for weakness and numbness. All other systems reviewed and are negative. Physical Exam   Physical Exam  Vitals and nursing note reviewed. HENT:      Mouth/Throat:      Mouth: Mucous membranes are moist.   Eyes:      Conjunctiva/sclera: Conjunctivae normal.   Cardiovascular:      Rate and Rhythm: Normal rate and regular rhythm. Pulses: Normal pulses.    Pulmonary:      Effort: Pulmonary effort is normal.   Abdominal:      General: Abdomen is flat. Musculoskeletal:         General: No deformity. Right lower leg: No edema. Left lower leg: No edema. Skin:     General: Skin is warm and dry. Neurological:      Mental Status: He is alert and oriented to person, place, and time. Mental status is at baseline. Psychiatric:         Behavior: Behavior normal.         Thought Content: Thought content normal.         Diagnostic Study Results   Labs  Recent Results (from the past 12 hour(s))   CBC WITH AUTOMATED DIFF    Collection Time: 03/23/22 12:07 PM   Result Value Ref Range    WBC 8.2 4.1 - 11.1 K/uL    RBC 5.05 4. 10 - 5.70 M/uL    HGB 14.1 12.1 - 17.0 g/dL    HCT 41.3 36.6 - 50.3 %    MCV 81.8 80.0 - 99.0 FL    MCH 27.9 26.0 - 34.0 PG    MCHC 34.1 30.0 - 36.5 g/dL    RDW 13.2 11.5 - 14.5 %    PLATELET 615 294 - 123 K/uL    MPV 9.7 8.9 - 12.9 FL    NRBC 0.0 0  WBC    ABSOLUTE NRBC 0.00 0.00 - 0.01 K/uL    NEUTROPHILS 63 32 - 75 %    LYMPHOCYTES 26 12 - 49 %    MONOCYTES 8 5 - 13 %    EOSINOPHILS 2 0 - 7 %    BASOPHILS 1 0 - 1 %    IMMATURE GRANULOCYTES 0 0.0 - 0.5 %    ABS. NEUTROPHILS 5.2 1.8 - 8.0 K/UL    ABS. LYMPHOCYTES 2.1 0.8 - 3.5 K/UL    ABS. MONOCYTES 0.6 0.0 - 1.0 K/UL    ABS. EOSINOPHILS 0.1 0.0 - 0.4 K/UL    ABS. BASOPHILS 0.0 0.0 - 0.1 K/UL    ABS. IMM.  GRANS. 0.0 0.00 - 0.04 K/UL    DF AUTOMATED     METABOLIC PANEL, COMPREHENSIVE    Collection Time: 03/23/22 12:07 PM   Result Value Ref Range    Sodium 138 136 - 145 mmol/L    Potassium 4.2 3.5 - 5.1 mmol/L    Chloride 105 97 - 108 mmol/L    CO2 31 21 - 32 mmol/L    Anion gap 2 (L) 5 - 15 mmol/L    Glucose 110 (H) 65 - 100 mg/dL    BUN 17 6 - 20 MG/DL    Creatinine 1.12 0.70 - 1.30 MG/DL    BUN/Creatinine ratio 15 12 - 20      GFR est AA >60 >60 ml/min/1.73m2    GFR est non-AA >60 >60 ml/min/1.73m2    Calcium 9.1 8.5 - 10.1 MG/DL    Bilirubin, total 0.4 0.2 - 1.0 MG/DL    ALT (SGPT) 51 12 - 78 U/L    AST (SGOT) 28 15 - 37 U/L Alk. phosphatase 96 45 - 117 U/L    Protein, total 7.5 6.4 - 8.2 g/dL    Albumin 3.9 3.5 - 5.0 g/dL    Globulin 3.6 2.0 - 4.0 g/dL    A-G Ratio 1.1 1.1 - 2.2     NT-PRO BNP    Collection Time: 03/23/22 12:07 PM   Result Value Ref Range    NT pro-BNP 67 <125 PG/ML   TROPONIN-HIGH SENSITIVITY    Collection Time: 03/23/22 12:07 PM   Result Value Ref Range    Troponin-High Sensitivity 7 0 - 76 ng/L   EKG, 12 LEAD, INITIAL    Collection Time: 03/23/22 12:12 PM   Result Value Ref Range    Ventricular Rate 78 BPM    Atrial Rate 78 BPM    P-R Interval 136 ms    QRS Duration 102 ms    Q-T Interval 380 ms    QTC Calculation (Bezet) 433 ms    Calculated P Axis 69 degrees    Calculated R Axis 25 degrees    Calculated T Axis 29 degrees    Diagnosis       Normal sinus rhythm  When compared with ECG of 26-SEP-2021 09:20,  premature ventricular complexes are no longer present  Confirmed by Sergio Jean ([id number]) on 3/23/2022 4:48:47 PM     TROPONIN-HIGH SENSITIVITY    Collection Time: 03/23/22  2:20 PM   Result Value Ref Range    Troponin-High Sensitivity 9 0 - 76 ng/L     Radiologic Studies  XR CHEST PA LAT   Final Result   No acute process. CT Results  (Last 48 hours)    None        CXR Results  (Last 48 hours)               03/23/22 1239  XR CHEST PA LAT Final result    Impression:  No acute process. Narrative:  Exam:  2 view chest       Indication: Chest pain. COMPARISON: 9/26/2021       PA and lateral views demonstrate normal heart size. The patient is on a cardiac   monitor. There is no acute process in the lung fields. The osseous structures   are unremarkable. Medical Decision Making   I am the first provider for this patient. I reviewed the vital signs, available nursing notes, past medical history, past surgical history, family history and social history. Vital Signs-Reviewed the patient's vital signs.   Patient Vitals for the past 12 hrs:   Temp Pulse Resp BP SpO2 03/23/22 1530 -- 74 20 (!) 184/100 96 %   03/23/22 1330 -- 75 22 (!) 172/99 96 %   03/23/22 1300 -- 74 22 (!) 155/116 96 %   03/23/22 1158 98.1 °F (36.7 °C) 84 16 (!) 184/109 95 %       Records Reviewed: Nursing Notes and Old Medical Records    Provider Notes (Medical Decision Making):   Differential includes hypertensive emergency with endorgan damage, dysrhythmia, electrolyte derangement, ischemia    We will obtain EKG troponin CBC CMP chest x-ray to evaluate for the above. ED Course:   Initial assessment performed. The patients presenting problems have been discussed, and they are in agreement with the care plan formulated and outlined with them. I have encouraged them to ask questions as they arise throughout their visit. ED Course as of 03/23/22 1747   Wed Mar 23, 2022   1259 EKG normal sinus rhythm normal axis normal intervals no STEMI no peak T waves [WB]   1259 CBC normal counts normal DF [WB]   1259 Chest x-ray shows no acute process [WB]   1453 Pending a repeat troponin [WB]   1556 Troponin is stable at 9 from 7 [WB]      ED Course User Index  [WB] Cisco Ross MD     Disposition:  Discharge Note:  The patient has been re-evaluated and is ready for discharge. Reviewed available results with patient. Counseled patient on diagnosis and care plan. Patient has expressed understanding, and all questions have been answered. Patient agrees with plan and agrees to follow up as recommended, or to return to the ED if their symptoms worsen. Discharge instructions have been provided and explained to the patient, along with reasons to return to the ED. PLAN:  Discharge Medication List as of 3/23/2022  4:22 PM        2. Follow-up Information     Follow up With Specialties Details Why Contact Mallory Frost,  Cardiology, Interventional Cardiology  Follow up as scheduled.   Discuss outpatient antihypertensives [address]  [address]  [facility]  [phone] 3.  Return to ED if worse     Diagnosis     Clinical Impression:   1. Tachycardia    2. Hypertension, unspecified type    3. Dizziness    4. PVC (premature ventricular contraction)      Attestations:    Mehreen Rojas M.D. Please note that this dictation was completed with [employer]ystems, the computer voice recognition software. Quite often unanticipated grammatical, syntax, homophones, and other interpretive errors are inadvertently transcribed by the computer software. Please disregard these errors. Please excuse any errors that have escaped final proofreading. Thank you.

## 2022-03-23 NOTE — ED NOTES
I have reviewed written and verbal discharge instructions with the patient. The patient verbalized understanding. I.V. removed, pt alert and ambulatory at discharge.

## 2022-06-23 ENCOUNTER — APPOINTMENT (OUTPATIENT)
Dept: GENERAL RADIOLOGY | Age: 47
End: 2022-06-23
Attending: STUDENT IN AN ORGANIZED HEALTH CARE EDUCATION/TRAINING PROGRAM
Payer: COMMERCIAL

## 2022-06-23 ENCOUNTER — HOSPITAL ENCOUNTER (EMERGENCY)
Age: 47
Discharge: HOME | End: 2022-06-23
Attending: EMERGENCY MEDICINE
Payer: COMMERCIAL

## 2022-06-23 VITALS
WEIGHT: 278.66 LBS | SYSTOLIC BLOOD PRESSURE: 167 MMHG | TEMPERATURE: 98.3 F | DIASTOLIC BLOOD PRESSURE: 95 MMHG | OXYGEN SATURATION: 99 % | HEART RATE: 67 BPM | RESPIRATION RATE: 16 BRPM | BODY MASS INDEX: 34.65 KG/M2 | HEIGHT: 75 IN

## 2022-06-23 DIAGNOSIS — M70.41 PREPATELLAR BURSITIS OF RIGHT KNEE: Primary | ICD-10-CM

## 2022-06-23 PROCEDURE — 73562 X-RAY EXAM OF KNEE 3: CPT

## 2022-06-23 PROCEDURE — 99283 EMERGENCY DEPT VISIT LOW MDM: CPT

## 2022-06-23 RX ORDER — DICLOFENAC SODIUM 10 MG/G
GEL TOPICAL 4 TIMES DAILY
Qty: 50 G | Refills: 0 | Status: SHIPPED | OUTPATIENT
Start: 2022-06-23 | End: 2022-08-21

## 2022-06-23 RX ORDER — METHYLPREDNISOLONE 4 MG/1
TABLET ORAL
Qty: 1 DOSE PACK | Refills: 0 | Status: SHIPPED | OUTPATIENT
Start: 2022-06-23 | End: 2022-08-21

## 2022-06-23 RX ORDER — ACETAMINOPHEN 500 MG
1000 TABLET ORAL
Qty: 40 TABLET | Refills: 0 | Status: SHIPPED | OUTPATIENT
Start: 2022-06-23 | End: 2022-08-21

## 2022-06-23 NOTE — ED PROVIDER NOTES
EMERGENCY DEPARTMENT HISTORY AND PHYSICAL EXAM      Date: 6/23/2022  Patient Name: Charissa Minaya    History of Presenting Illness     Chief Complaint   Patient presents with    Knee Pain     right knee pain onset two days ago. \"it feels like hit in baseball bat with a knee\" hurts to put pressure on it       History Provided By: Patient    HPI: Charissa Minaya, 51 y.o. male with PMHx significant for hypertension, presents to the ED with cc of right knee pain for 2 to 3 days. The patient reports that it \"feels like I got hit with a baseball bat in the knee\". The pain is worse with movement of the joint and with palpation. He has not tried any over-the-counter analgesics. 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 He denies fever. There are no other complaints, changes, or physical findings at this time. PCP: Unknown, Provider, PA    No current facility-administered medications on file prior to encounter. Current Outpatient Medications on File Prior to Encounter   Medication Sig Dispense Refill    lisinopril-hydroCHLOROthiazide (PRINZIDE, ZESTORETIC) 20-12.5 mg per tablet Take 1 Tablet by mouth daily.  chlorthalidone (HYGROTON) 25 mg tablet Take 25 mg by mouth daily.  traZODone (DESYREL) 50 mg tablet Take 50 mg by mouth nightly.  amphetamine-dextroamphetamine XR (ADDERALL XR) 20 mg XR capsule Take 20 mg by mouth daily.  predniSONE (STERAPRED DS) 10 mg dose pack Take as directed on packaging 48 Tablet 0    amlodipine besylate (AMLODIPINE PO) Take  by mouth.  HYDROCHLOROTHIAZIDE PO Take  by mouth.       azithromycin (ZITHROMAX) 250 mg tablet Take 2 tabs on day 1, then 1 tab a day for 4 days 6 Tablet 0       Past History     Past Medical History:  Past Medical History:   Diagnosis Date    Advance directive discussed with patient 10/22/2015    IBS (irritable bowel syndrome)     Knee pain, acute 10/02/14 prepatellar bursitis Jasson Iverson 10/09/14    Ruptured tendon 2/2014    left distal biceps tendon ([facility] injury)       Past Surgical History:  Past Surgical History:   Procedure Laterality Date    HX ORTHOPAEDIC Left 1979    foot injury    [place] EXTRAC ERUPTED TOOTH/EXPOSED ROOT  08/2017    Having teeth pulled and will have more dental work. Family History:  Family History   Problem Relation Age of Onset    Cancer Maternal Grandmother        Social History:  Social History     Tobacco Use    Smoking status: Never Smoker    Smokeless tobacco: Never Used   Substance Use Topics    Alcohol use: Not Currently     Comment: ; weekends    Drug use: No       Allergies:  No Known Allergies      Review of Systems   Review of Systems   Constitutional: Negative for chills and fever. HENT: Negative for ear pain and sore throat. Eyes: Negative for redness and visual disturbance. Respiratory: Negative for cough and shortness of breath. Cardiovascular: Negative for chest pain and palpitations. Gastrointestinal: Negative for abdominal pain, nausea and vomiting. Genitourinary: Negative for dysuria and hematuria. Musculoskeletal: Negative for back pain and gait problem. Positive for right knee pain   Skin: Negative for rash and wound. Neurological: Negative for dizziness and headaches. Psychiatric/Behavioral: Negative for behavioral problems and confusion. All other systems reviewed and are negative. Physical Exam   Physical Exam  Constitutional:       Appearance: He is not toxic-appearing. HENT:      Head: Normocephalic and atraumatic. Mouth/Throat:      Mouth: Mucous membranes are moist.   Eyes:      Extraocular Movements: Extraocular movements intact. Pupils: Pupils are equal, round, and reactive to light. Cardiovascular:      Rate and Rhythm: Normal rate and regular rhythm. Pulmonary:      Effort: Pulmonary effort is normal. No respiratory distress.    Musculoskeletal: General: No deformity. Normal range of motion. Cervical back: Normal range of motion and neck supple. Comments: Soft tissue swelling to the right anterior knee diffusely. No overlying erythema or warmth. Full range of motion. Patient ambulates with antalgic gait. Skin:     General: Skin is warm and dry. Neurological:      General: No focal deficit present. Mental Status: He is alert and oriented to person, place, and time. Psychiatric:         Behavior: Behavior normal.           Diagnostic Study Results     Labs -   No results found for this or any previous visit (from the past 12 hour(s)). Radiologic Studies -   XR KNEE RT 3 V   Final Result   Prepatellar soft tissue swelling. No acute fracture. CT Results  (Last 48 hours)    None        CXR Results  (Last 48 hours)    None            Medical Decision Making   I am the first provider for this patient. I reviewed the vital signs, available nursing notes, past medical history, past surgical history, family history and social history. Vital Signs-Reviewed the patient's vital signs. Patient Vitals for the past 12 hrs:   Temp Pulse Resp BP SpO2   06/23/22 1000 98.3 °F (36.8 °C) 67 16 (!) 167/95 99 %         Records Reviewed: Nursing Notes and Old Medical Records      Provider Notes (Medical Decision Making):   DDx: Osteoarthritis, bursitis, internal derangement    Patient presents with right knee pain and swelling. X-ray shows prepatellar soft tissue swelling, consistent with bursitis. This is consistent with his history of putting pressure on his knees frequently for work. There is no erythema or warmth to the joint to suggest infection. Plan to treat with steroids and analgesia. Advised RICE, follow-up with Ortho in 1 week if not improving. ED Course:   Initial assessment performed.  The patients presenting problems have been discussed, and they are in agreement with the care plan formulated and outlined with them.  I have encouraged them to ask questions as they arise throughout their visit. Disposition:  12:04 PM  The patient has been re-evaluated and is ready for discharge. Reviewed available results with patient. Counseled patient on diagnosis and care plan. Patient has expressed understanding, and all questions have been answered. Patient agrees with plan and agrees to follow up as recommended, or to return to the ED if their symptoms worsen. Discharge instructions have been provided and explained to the patient, along with reasons to return to the ED. PLAN:  1. Discharge Medication List as of 6/23/2022 12:04 PM      START taking these medications    Details   methylPREDNISolone (Medrol, Edward,) 4 mg tablet Follow taper, Normal, Disp-1 Dose Pack, R-0      acetaminophen (Tylenol Extra Strength) 500 mg tablet Take 2 Tablets by mouth every six (6) hours as needed for Pain., Normal, Disp-40 Tablet, R-0      diclofenac (Voltaren) 1 % gel Apply  to affected area four (4) times daily. , Normal, Disp-50 g, R-0         CONTINUE these medications which have NOT CHANGED    Details   lisinopril-hydroCHLOROthiazide (PRINZIDE, ZESTORETIC) 20-12.5 mg per tablet Take 1 Tablet by mouth daily. , Historical Med      chlorthalidone (HYGROTON) 25 mg tablet Take 25 mg by mouth daily. , Historical Med      traZODone (DESYREL) 50 mg tablet Take 50 mg by mouth nightly., Historical Med      amphetamine-dextroamphetamine XR (ADDERALL XR) 20 mg XR capsule Take 20 mg by mouth daily. , Historical Med      predniSONE (STERAPRED DS) 10 mg dose pack Take as directed on packaging, Normal, Disp-48 Tablet, R-0      amlodipine besylate (AMLODIPINE PO) Take  by mouth., Historical Med      HYDROCHLOROTHIAZIDE PO Take  by mouth., Historical Med      azithromycin (ZITHROMAX) 250 mg tablet Take 2 tabs on day 1, then 1 tab a day for 4 days, Print, Disp-6 Tablet, R-0           2.    Follow-up Information     Follow up With Specialties Details Why Contact Info    Roberto Clinton, DO Orthopedic Surgery Schedule an appointment as soon as possible for a visit in 1 week if not improving [address]  [address]  [facility]  [phone]          Return to ED if worse     Diagnosis     Clinical Impression:   1. Prepatellar bursitis of right knee            Jeanne Celis.  RUSH Patton

## 2022-06-23 NOTE — Clinical Note
Adrian Cunha was seen and treated in our emergency department on 6/23/2022. Please excuse him from work tomorrow, June 24. He may return to work on Monday, June 27.           Deandra Mi, [address]

## 2022-08-19 ENCOUNTER — HOSPITAL ENCOUNTER (EMERGENCY)
Age: 47
Discharge: HOME | End: 2022-08-20
Attending: EMERGENCY MEDICINE
Payer: COMMERCIAL

## 2022-08-19 VITALS
WEIGHT: 285.94 LBS | SYSTOLIC BLOOD PRESSURE: 151 MMHG | HEART RATE: 73 BPM | RESPIRATION RATE: 20 BRPM | OXYGEN SATURATION: 98 % | HEIGHT: 75 IN | BODY MASS INDEX: 35.55 KG/M2 | TEMPERATURE: 97.9 F | DIASTOLIC BLOOD PRESSURE: 94 MMHG

## 2022-08-19 DIAGNOSIS — I16.0 HYPERTENSIVE URGENCY: Primary | ICD-10-CM

## 2022-08-19 LAB
ALBUMIN SERPL-MCNC: 3.9 G/DL (ref 3.5–5)
ALBUMIN/GLOB SERPL: 1.1 {RATIO} (ref 1.1–2.2)
ALP SERPL-CCNC: 88 U/L (ref 45–117)
ALT SERPL-CCNC: 81 U/L (ref 12–78)
ANION GAP SERPL CALC-SCNC: 7 MMOL/L (ref 5–15)
AST SERPL-CCNC: 51 U/L (ref 15–37)
BASOPHILS # BLD: 0 K/UL (ref 0–0.1)
BASOPHILS NFR BLD: 1 % (ref 0–1)
BILIRUB SERPL-MCNC: 0.5 MG/DL (ref 0.2–1)
BUN SERPL-MCNC: 16 MG/DL (ref 6–20)
BUN/CREAT SERPL: 14 (ref 12–20)
CALCIUM SERPL-MCNC: 9 MG/DL (ref 8.5–10.1)
CHLORIDE SERPL-SCNC: 103 MMOL/L (ref 97–108)
CO2 SERPL-SCNC: 27 MMOL/L (ref 21–32)
CREAT SERPL-MCNC: 1.12 MG/DL (ref 0.7–1.3)
DIFFERENTIAL METHOD BLD: NORMAL
EOSINOPHIL # BLD: 0.2 K/UL (ref 0–0.4)
EOSINOPHIL NFR BLD: 3 % (ref 0–7)
ERYTHROCYTE [DISTWIDTH] IN BLOOD BY AUTOMATED COUNT: 13.9 % (ref 11.5–14.5)
GLOBULIN SER CALC-MCNC: 3.5 G/DL (ref 2–4)
GLUCOSE SERPL-MCNC: 144 MG/DL (ref 65–100)
HCT VFR BLD AUTO: 41.5 % (ref 36.6–50.3)
HGB BLD-MCNC: 14.1 G/DL (ref 12.1–17)
IMM GRANULOCYTES # BLD AUTO: 0 K/UL (ref 0–0.04)
IMM GRANULOCYTES NFR BLD AUTO: 0 % (ref 0–0.5)
LYMPHOCYTES # BLD: 2.3 K/UL (ref 0.8–3.5)
LYMPHOCYTES NFR BLD: 28 % (ref 12–49)
MCH RBC QN AUTO: 28.8 PG (ref 26–34)
MCHC RBC AUTO-ENTMCNC: 34 G/DL (ref 30–36.5)
MCV RBC AUTO: 84.7 FL (ref 80–99)
MONOCYTES # BLD: 0.5 K/UL (ref 0–1)
MONOCYTES NFR BLD: 7 % (ref 5–13)
NEUTS SEG # BLD: 5.1 K/UL (ref 1.8–8)
NEUTS SEG NFR BLD: 61 % (ref 32–75)
NRBC # BLD: 0 K/UL (ref 0–0.01)
NRBC BLD-RTO: 0 PER 100 WBC
PLATELET # BLD AUTO: 250 K/UL (ref 150–400)
PMV BLD AUTO: 8.9 FL (ref 8.9–12.9)
POTASSIUM SERPL-SCNC: 3.6 MMOL/L (ref 3.5–5.1)
PROT SERPL-MCNC: 7.4 G/DL (ref 6.4–8.2)
RBC # BLD AUTO: 4.9 M/UL (ref 4.1–5.7)
SODIUM SERPL-SCNC: 137 MMOL/L (ref 136–145)
TROPONIN-HIGH SENSITIVITY: 8 NG/L (ref 0–76)
WBC # BLD AUTO: 8.3 K/UL (ref 4.1–11.1)

## 2022-08-19 PROCEDURE — 74011000250 HC RX REV CODE- 250: Performed by: EMERGENCY MEDICINE

## 2022-08-19 PROCEDURE — 99284 EMERGENCY DEPT VISIT MOD MDM: CPT

## 2022-08-19 PROCEDURE — 93005 ELECTROCARDIOGRAM TRACING: CPT

## 2022-08-19 PROCEDURE — 96374 THER/PROPH/DIAG INJ IV PUSH: CPT

## 2022-08-19 PROCEDURE — 85025 COMPLETE CBC W/AUTO DIFF WBC: CPT

## 2022-08-19 PROCEDURE — 74011250637 HC RX REV CODE- 250/637: Performed by: EMERGENCY MEDICINE

## 2022-08-19 PROCEDURE — 84484 ASSAY OF TROPONIN QUANT: CPT

## 2022-08-19 PROCEDURE — 36415 COLL VENOUS BLD VENIPUNCTURE: CPT

## 2022-08-19 PROCEDURE — 80053 COMPREHEN METABOLIC PANEL: CPT

## 2022-08-19 RX ORDER — LABETALOL HYDROCHLORIDE 5 MG/ML
20 INJECTION, SOLUTION INTRAVENOUS ONCE
Status: COMPLETED | OUTPATIENT
Start: 2022-08-19 | End: 2022-08-19

## 2022-08-19 RX ORDER — CLONIDINE HYDROCHLORIDE 0.1 MG/1
0.1 TABLET ORAL
Status: COMPLETED | OUTPATIENT
Start: 2022-08-19 | End: 2022-08-19

## 2022-08-19 RX ADMIN — CLONIDINE HYDROCHLORIDE 0.1 MG: 0.1 TABLET ORAL at 22:29

## 2022-08-19 RX ADMIN — LABETALOL HYDROCHLORIDE 20 MG: 5 INJECTION, SOLUTION INTRAVENOUS at 22:29

## 2022-08-19 NOTE — ED TRIAGE NOTES
.  Chief Complaint   Patient presents with    Hypertension     Pt presents with c/o HTN, pt on BP monitor, pt states headache and dizziness earlier, but states that he has no symptoms now. Pt A&Ox4. Pt BP in triage is 189/104. Pt states he last took BP medication 4 hrs prior to arrival, BP at home 200s SBP.  Took Carvedilol and Lisinopril

## 2022-08-19 NOTE — Clinical Note
[address]  [facility] EMERGENCY DEPT  [address]  [place] [address] [phone]    Work/School Note    Date: 8/19/2022    To Whom It May concern:    Joana Verma was seen and treated today in the emergency room by the following provider(s):  Attending Provider: Burgess Jill MD.      Joana Verma is excused from work/school on 08/19/22 and 08/20/22. He is medically clear to return to work/school on 8/21/2022.        Sincerely,          Prema Cool MD

## 2022-08-20 LAB
ATRIAL RATE: 86 BPM
CALCULATED P AXIS, ECG09: 40 DEGREES
CALCULATED R AXIS, ECG10: 55 DEGREES
CALCULATED T AXIS, ECG11: 34 DEGREES
DIAGNOSIS, 93000: NORMAL
P-R INTERVAL, ECG05: 148 MS
Q-T INTERVAL, ECG07: 390 MS
QRS DURATION, ECG06: 108 MS
QTC CALCULATION (BEZET), ECG08: 466 MS
VENTRICULAR RATE, ECG03: 86 BPM

## 2022-08-20 NOTE — DISCHARGE INSTRUCTIONS
Your examination and laboratories today are reassuring. If your blood pressures continue to remain high despite taking all the blood pressure medication that you are currently taking, talk to Dr. Marcia Currie about whether adding clonidine would be advisable for you.

## 2022-08-20 NOTE — ED PROVIDER NOTES
EMERGENCY DEPARTMENT HISTORY AND PHYSICAL EXAM           Date: 8/19/2022  Patient Name: Elaine Perry  Patient Age and Sex: 51 y.o. male  MRN:  [id number]  CSN:  [id number]    History of Presenting Illness     Chief Complaint   Patient presents with    Hypertension     Pt presents with c/o HTN, pt on BP monitor, pt states headache and dizziness earlier, but states that he has no symptoms now. Pt A&Ox4. Pt BP in triage is 189/104. Pt states he last took BP medication 4 hrs prior to arrival, BP at home 200s SBP. Took Carvedilol and Lisinopril       History Provided By: Patient    Ability to gather history was limited by:     HPI: Elaine Perry, 51 y.o. male with history of obesity, difficult to control hypertension, complains of high blood pressure today 200/100 range, accompanied by mild headache and lightheadedness. No significant chest pain or chest pressure. Currently here in the emergency department he is asymptomatic. He is taking all of his blood pressure medications as prescribed, including amlodipine, carvedilol, chlorthalidone, and lisinopril. Location:    Quality:      Severity:    Duration:   Timing:      Context:    Modifying factors:   Associated symptoms:     Past History     The patient's medical, surgical, and social history on file were reviewed by me today. The family history was reviewed by me today and was non-contributory, unless otherwise specified below:    Past Medical History:  Past Medical History:   Diagnosis Date    Advance directive discussed with patient 10/22/2015    IBS (irritable bowel syndrome)     Knee pain, acute 10/02/14     prepatellar bursitis [place] 10/09/14    Ruptured tendon 2/2014    left distal biceps tendon (WC injury)       Past Surgical History:  Past Surgical History:   Procedure Laterality Date    HX ORTHOPAEDIC Left 1979    foot injury    OR EXTRAC ERUPTED TOOTH/EXPOSED ROOT  08/2017    Having teeth pulled and will have more dental work. Family History:  Family History   Problem Relation Age of Onset    Cancer Maternal Grandmother        Social History:  Social History     Tobacco Use    Smoking status: Never    Smokeless tobacco: Never   Substance Use Topics    Alcohol use: Not Currently     Comment: ; weekends    Drug use: No       Current Medications:  No current facility-administered medications on file prior to encounter. Current Outpatient Medications on File Prior to Encounter   Medication Sig Dispense Refill    methylPREDNISolone (Medrol, Edward,) 4 mg tablet Follow taper 1 Dose Pack 0    acetaminophen (Tylenol Extra Strength) 500 mg tablet Take 2 Tablets by mouth every six (6) hours as needed for Pain. 40 Tablet 0    diclofenac (Voltaren) 1 % gel Apply  to affected area four (4) times daily. 50 g 0    lisinopril-hydroCHLOROthiazide (PRINZIDE, ZESTORETIC) 20-12.5 mg per tablet Take 1 Tablet by mouth daily. chlorthalidone (HYGROTON) 25 mg tablet Take 25 mg by mouth daily. traZODone (DESYREL) 50 mg tablet Take 50 mg by mouth nightly. amphetamine-dextroamphetamine XR (ADDERALL XR) 20 mg XR capsule Take 20 mg by mouth daily. predniSONE (STERAPRED DS) 10 mg dose pack Take as directed on packaging 48 Tablet 0    amlodipine besylate (AMLODIPINE PO) Take  by mouth. HYDROCHLOROTHIAZIDE PO Take  by mouth. azithromycin (ZITHROMAX) 250 mg tablet Take 2 tabs on day 1, then 1 tab a day for 4 days 6 Tablet 0       Allergies:  No Known Allergies  Review of Systems   A complete ROS was reviewed by me today and was negative, unless otherwise specified below:    Review of Systems   Constitutional:  Negative for fatigue and fever. Respiratory:  Positive for chest tightness. Negative for shortness of breath. Cardiovascular:  Negative for chest pain. Neurological:  Positive for light-headedness. Negative for headaches. All other systems reviewed and are negative.     Physical Exam   Vital Signs  Patient Vitals for the past 8 hrs:   Temp Pulse Resp BP SpO2   08/19/22 2304 -- 73 -- (!) 151/94 --   08/19/22 2133 -- -- -- (!) 169/114 --   08/19/22 1925 97.9 °F (36.6 °C) 88 20 (!) 189/104 98 %          Physical Exam  Vitals and nursing note reviewed. Constitutional:       General: He is not in acute distress. Appearance: Normal appearance. He is well-developed. He is obese. He is not ill-appearing. HENT:      Head: Normocephalic and atraumatic. Eyes:      General:         Right eye: No discharge. Left eye: No discharge. Conjunctiva/sclera: Conjunctivae normal.   Cardiovascular:      Rate and Rhythm: Normal rate and regular rhythm. Heart sounds: Normal heart sounds. No murmur heard. Pulmonary:      Effort: Pulmonary effort is normal. No respiratory distress. Breath sounds: Normal breath sounds. No wheezing. Abdominal:      General: There is no distension. Palpations: Abdomen is soft. Tenderness: There is no abdominal tenderness. Musculoskeletal:         General: No deformity. Normal range of motion. Cervical back: Normal range of motion and neck supple. Skin:     General: Skin is warm and dry. Findings: No rash. Neurological:      General: No focal deficit present. Mental Status: He is alert and oriented to person, place, and time. Psychiatric:         Mood and Affect: Mood normal.         Behavior: Behavior normal.         Thought Content: Thought content normal.       Diagnostic Study Results   Labs  Recent Results (from the past 24 hour(s))   CBC WITH AUTOMATED DIFF    Collection Time: 08/19/22  7:36 PM   Result Value Ref Range    WBC 8.3 4.1 - 11.1 K/uL    RBC 4.90 4. 10 - 5.70 M/uL    HGB 14.1 12.1 - 17.0 g/dL    HCT 41.5 36.6 - 50.3 %    MCV 84.7 80.0 - 99.0 FL    MCH 28.8 26.0 - 34.0 PG    MCHC 34.0 30.0 - 36.5 g/dL    RDW 13.9 11.5 - 14.5 %    PLATELET 795 729 - 366 K/uL    MPV 8.9 8.9 - 12.9 FL    NRBC 0.0 0  WBC    ABSOLUTE NRBC 0.00 0.00 - 0.01 K/uL NEUTROPHILS 61 32 - 75 %    LYMPHOCYTES 28 12 - 49 %    MONOCYTES 7 5 - 13 %    EOSINOPHILS 3 0 - 7 %    BASOPHILS 1 0 - 1 %    IMMATURE GRANULOCYTES 0 0.0 - 0.5 %    ABS. NEUTROPHILS 5.1 1.8 - 8.0 K/UL    ABS. LYMPHOCYTES 2.3 0.8 - 3.5 K/UL    ABS. MONOCYTES 0.5 0.0 - 1.0 K/UL    ABS. EOSINOPHILS 0.2 0.0 - 0.4 K/UL    ABS. BASOPHILS 0.0 0.0 - 0.1 K/UL    ABS. IMM. GRANS. 0.0 0.00 - 0.04 K/UL    DF AUTOMATED     METABOLIC PANEL, COMPREHENSIVE    Collection Time: 08/19/22  7:36 PM   Result Value Ref Range    Sodium 137 136 - 145 mmol/L    Potassium 3.6 3.5 - 5.1 mmol/L    Chloride 103 97 - 108 mmol/L    CO2 27 21 - 32 mmol/L    Anion gap 7 5 - 15 mmol/L    Glucose 144 (H) 65 - 100 mg/dL    BUN 16 6 - 20 MG/DL    Creatinine 1.12 0.70 - 1.30 MG/DL    BUN/Creatinine ratio 14 12 - 20      GFR est AA >60 >60 ml/min/1.73m2    GFR est non-AA >60 >60 ml/min/1.73m2    Calcium 9.0 8.5 - 10.1 MG/DL    Bilirubin, total 0.5 0.2 - 1.0 MG/DL    ALT (SGPT) 81 (H) 12 - 78 U/L    AST (SGOT) 51 (H) 15 - 37 U/L    Alk. phosphatase 88 45 - 117 U/L    Protein, total 7.4 6.4 - 8.2 g/dL    Albumin 3.9 3.5 - 5.0 g/dL    Globulin 3.5 2.0 - 4.0 g/dL    A-G Ratio 1.1 1.1 - 2.2     TROPONIN-HIGH SENSITIVITY    Collection Time: 08/19/22  7:36 PM   Result Value Ref Range    Troponin-High Sensitivity 8 0 - 76 ng/L       Radiologic Studies  No orders to display     CT Results  (Last 48 hours)      None          CXR Results  (Last 48 hours)      None            Billable Procedures   EKG reviewed by ED Physician in the absence of a cardiologist: Yes  EKG below was interpreted by Salazar Tai MD    Procedures    Medical Decision Making     I reviewed the patient's most recent Emergency Dept notes and diagnostic tests in formulating my MDM on today's visit.     Provider Notes (Medical Decision Making):   39-year-old male with longstanding poorly controlled hypertension despite multiple medications, complaining of higher than usual blood pressure today 200/100 range accompanied by mild symptoms of lightheadedness and chest tightness, currently resolved. At the time of my H&P he has no symptoms and his blood pressure is 170/110 range. All of his laboratories are reassuring. Troponin of 8. No significant concern for endorgan dysfunction, ACS, MI, SHELLEY etc.    Patient was administered labetalol and clonidine, blood pressure improved. Currently 151/94. Asymptomatic. He will follow-up in the outpatient setting with his cardiologist, recommend that he discuss adding clonidine to his regimen. Soniya Tan MD  10:31 PM  8/19/2022       Social History     Tobacco Use    Smoking status: Never    Smokeless tobacco: Never   Substance Use Topics    Alcohol use: Not Currently     Comment: ; weekends    Drug use: No       Medications Administered during ED course:  Medications   labetaloL (NORMODYNE;TRANDATE) injection 20 mg (20 mg IntraVENous Given 8/19/22 2229)   cloNIDine HCL (CATAPRES) tablet 0.1 mg (0.1 mg Oral Given 8/19/22 2229)          Prescriptions from today's ED visit:  Current Discharge Medication List         Diagnosis and Disposition     Disposition:  Discharged    Clinical Impression:   1. Hypertensive urgency        Attestation:  I personally performed the services described in this documentation on this date 8/19/2022 for patient Cristine Foster. Soniya Tan MD        I was the first provider for this patient on this visit. To the best of my ability I reviewed relevant prior medical records, electrocardiograms, laboratories, and radiologic studies. The patient's presenting problems were discussed, and the patient was in agreement with the care plan formulated and outlined with them. Soniya Tan MD    Please note that this dictation was completed with Dragon voice recognition software.  Quite often unanticipated grammatical, syntax, homophones, and other interpretive errors are inadvertently transcribed by the computer software. Please disregard these errors and excuse any errors that have escaped final proofreading.

## 2022-08-21 ENCOUNTER — HOSPITAL ENCOUNTER (EMERGENCY)
Age: 47
Discharge: HOME | End: 2022-08-21
Attending: EMERGENCY MEDICINE
Payer: COMMERCIAL

## 2022-08-21 VITALS
BODY MASS INDEX: 35.36 KG/M2 | DIASTOLIC BLOOD PRESSURE: 100 MMHG | WEIGHT: 284.39 LBS | HEART RATE: 77 BPM | RESPIRATION RATE: 16 BRPM | TEMPERATURE: 98.8 F | HEIGHT: 75 IN | SYSTOLIC BLOOD PRESSURE: 149 MMHG | OXYGEN SATURATION: 97 %

## 2022-08-21 DIAGNOSIS — I10 HYPERTENSION, UNSPECIFIED TYPE: Primary | ICD-10-CM

## 2022-08-21 DIAGNOSIS — R51.9 ACUTE NONINTRACTABLE HEADACHE, UNSPECIFIED HEADACHE TYPE: ICD-10-CM

## 2022-08-21 PROCEDURE — 99283 EMERGENCY DEPT VISIT LOW MDM: CPT

## 2022-08-21 RX ORDER — ACETAMINOPHEN 325 MG/1
1000 TABLET ORAL
Qty: 20 TABLET | Refills: 0 | Status: SHIPPED | OUTPATIENT
Start: 2022-08-21

## 2022-08-21 RX ORDER — IBUPROFEN 600 MG/1
600 TABLET ORAL
Qty: 20 TABLET | Refills: 0 | Status: SHIPPED | OUTPATIENT
Start: 2022-08-21

## 2022-08-21 RX ORDER — CARVEDILOL 25 MG/1
25 TABLET ORAL 2 TIMES DAILY WITH MEALS
COMMUNITY

## 2022-08-21 RX ORDER — LISINOPRIL 40 MG/1
40 TABLET ORAL 2 TIMES DAILY
COMMUNITY

## 2022-08-21 NOTE — ED TRIAGE NOTES
Pt ambulatory to ED with c/o elevated BP over the last 2 days with headache and fatigue. \"My readings at home have been high. So today I had the fire and rescue dept take it. They said it was high and told me to come here. I was seen at another hospital on Friday. They gave me something IV and a pill then sent me home\".

## 2022-08-21 NOTE — ED PROVIDER NOTES
44-year-old male with past medical history of IBS, knee pain, hypertension and ruptured bicep tendon presents ambulatory with complaints of headache and fatigue and elevated BP. Patient states that he has been checking his BP at home over the last few days since being seen at [address] [facility] on 8/19 for hypertensive emergency, today he had a headache, went to work and stopped by the [employer] to get his BP checked 177/100 and they advised him to either come to the ER by driving himself or they would bring him. Patient voices that he only came to the ER today because the fire station told him too. Patient denies trying anything OTC to help with his headache pain. Patient denies head trauma, cough, congestion, feeling dizzy or light headed, vision change, fever, chills, CP, SOB, abdominal pain to include n/v/d or difficulty urinating. Dr Kiley Perez- Cardiology has been managing BP medications- patient states that he has had several changes to his medications over the last 2 weeks, patient has worn a home telemetry monitor within the last few weeks, had a kidney ultrasound, ECHO. ... PCP has been monitoring medication changes. Occasional alcohol use every 2 weeks, denies tobacco use or illicit drug use. Past Medical History:   Diagnosis Date    Advance directive discussed with patient 10/22/2015    IBS (irritable bowel syndrome)     Knee pain, acute 10/02/14     prepatellar bursitis [place] 10/09/14    Ruptured tendon 2/2014    left distal biceps tendon (WC injury)       Past Surgical History:   Procedure Laterality Date    HX ORTHOPAEDIC Left 1979    foot injury    [facility] EXTRAC ERUPTED TOOTH/EXPOSED ROOT  08/2017    Having teeth pulled and will have more dental work.          Family History:   Problem Relation Age of Onset    Cancer Maternal Grandmother        Social History     Socioeconomic History    Marital status:      Spouse name: Not on file    Number of children: Not on file    Years of education: Not on file    Highest education level: Not on file   Occupational History    Not on file   Tobacco Use    Smoking status: Never    Smokeless tobacco: Never   Substance and Sexual Activity    Alcohol use: Not Currently     Comment: ; weekends    Drug use: No    Sexual activity: Never   Other Topics Concern    Not on file   Social History Narrative    Not on file     Social Determinants of Health     Financial Resource Strain: Not on file   Food Insecurity: Not on file   Transportation Needs: Not on file   Physical Activity: Not on file   Stress: Not on file   Social Connections: Not on file   Intimate Partner Violence: Not on file   Housing Stability: Not on file         ALLERGIES: Patient has no known allergies. Review of Systems   Constitutional:  Positive for fatigue. Negative for chills and fever. Respiratory:  Negative for cough and shortness of breath. Cardiovascular:  Negative for chest pain. Gastrointestinal:  Negative for abdominal pain. Genitourinary: Negative. Musculoskeletal: Negative. Neurological:  Positive for headaches. Negative for dizziness, weakness and light-headedness. Psychiatric/Behavioral:  Negative for confusion. Vitals:    08/21/22 0933 08/21/22 1028   BP: (!) 155/96 (!) 149/100   Pulse: 80 77   Resp: 17 16   Temp: 98.8 °F (37.1 °C)    SpO2: 98% 97%   Weight: 129 kg (284 lb 6.3 oz)    Height: 6' 3\" (1.905 m)             Physical Exam  Vitals and nursing note reviewed. Constitutional:       Appearance: Normal appearance. HENT:      Head: Normocephalic. Nose:      Comments: Mask in place. Eyes:      Pupils: Pupils are equal, round, and reactive to light. Cardiovascular:      Rate and Rhythm: Normal rate. Pulses: Normal pulses. Pulmonary:      Effort: Pulmonary effort is normal. No respiratory distress. Abdominal:      General: There is no distension. Palpations: Abdomen is soft. Tenderness: There is no abdominal tenderness. There is no right CVA tenderness or left CVA tenderness. Musculoskeletal:         General: Normal range of motion. Cervical back: Normal range of motion. Skin:     General: Skin is warm and dry. Capillary Refill: Capillary refill takes less than 2 seconds. Neurological:      Mental Status: He is alert and oriented to person, place, and time. GCS: GCS eye subscore is 4. GCS verbal subscore is 5. GCS motor subscore is 6. Cranial Nerves: Cranial nerves are intact. No facial asymmetry. Sensory: Sensation is intact. Motor: Motor function is intact. No weakness. Coordination: Coordination is intact. Gait: Gait is intact. Psychiatric:         Mood and Affect: Mood normal.        MDM  Number of Diagnoses or Management Options  Acute nonintractable headache, unspecified headache type: minor  Hypertension, unspecified type: established and improving  Diagnosis management comments: Differential DX: HTN vs. Headache vs brain mass       Amount and/or Complexity of Data Reviewed  Discuss the patient with other providers: yes (Case discussed with Dr. David Sheets.)    Risk of Complications, Morbidity, and/or Mortality  Presenting problems: low  Diagnostic procedures: low  Management options: low    Patient Progress  Patient progress: stable         Procedures    VITAL SIGNS:  Patient Vitals for the past 4 hrs:   Temp Pulse Resp BP SpO2   08/21/22 1028 -- 77 16 (!) 149/100 97 %   08/21/22 0933 98.8 °F (37.1 °C) 80 17 (!) 155/96 98 %         LABS:  No results found for this or any previous visit (from the past 6 hour(s)). IMAGING:  No orders to display         Medications During Visit:  Medications - No data to display      DECISION MAKING:  Cristine Foster is a 51 y.o. male who comes in as above. Well-appearing 58-year-old male presents ambulatory with complaints of headache and fatigue and elevated BP.  Patient states that he has been checking his BP at home over the last few days since being seen at [facility] on 8/19 for hypertensive emergency- treated with labetalol and clonidine, today he had a headache, went to work and stopped by the [employer] to get his BP checked 177/100 and they advised him to either come to the ER by driving himself or they would bring him. Patient voices that he only came to the ER today because the fire station told him too. Patient denies trying anything OTC to help with his headache pain. Patient denies head trauma, cough, congestion, feeling dizzy or light headed, vision change, fever, chills, CP, SOB, abdominal pain to include n/v/d or difficulty urinating. Dr Kayden Ayala- Cardiology has been managing BP medications- patient states that he has had several changes to his medications over the last 2 weeks, patient has worn a home telemetry monitor within the last few weeks, had a kidney ultrasound, ECHO. ... PCP has been monitoring medication changes. Patient and I had a long discussion regarding current events. Patient was offered medication for his headache, rating pain 4/10, spanning across the front of his head. Patient states that he has noticed that the headaches have been more frequent since the blood pressure medications have been changed over the last few weeks. Patient denies trauma, feeling dizzy or lightheaded or vision change, no CP or SOB, no N/V or abdominal pain. Patient states that he was afraid to take Tylenol or ibuprofen because he was unsure. Patient education provided regarding over-the-counter medications that he may take, return to the ER symptoms provided. Patient refusing need for medication at this time, states that he can take Tylenol when he gets home. Patient states that he has been trying to adjust what he is eating and cutting back on salt, does not drink caffeine other than once a week, no alcohol or illicit drug use.   Patient reinforces that the only reason why he came to the ER was because EMS advised him to, states that he has been following up with his PCP and his cardiologist has been managing his blood pressure medications. Discussed CT head and repeating labs, patient states that he does not want a CT head and does not feel like he needs labs at this time. We reviewed all labs and EKG from [facility] on 8/19, patient provided with a work note. States that he will call his PCP on Monday to schedule a follow up appt and continue with Dr Scott Seay as already scheduled. PAtient provided strict return precautions, verbalizes understanding and agrees with plan. Patient ambulatory out of ER independently with a steady gait, sent home with RX for Tylenol and Ibuprofen as well as dietary guidance for changes. IMPRESSION:  1. Hypertension, unspecified type    2. Acute nonintractable headache, unspecified headache type        DISPOSITION:  Discharged      Current Discharge Medication List        START taking these medications    Details   acetaminophen (TYLENOL) 325 mg tablet Take 3 Tablets by mouth every six (6) hours as needed for Pain. Qty: 20 Tablet, Refills: 0  Start date: 8/21/2022      ibuprofen (MOTRIN) 600 mg tablet Take 1 Tablet by mouth every six (6) hours as needed for Pain. Qty: 20 Tablet, Refills: 0  Start date: 8/21/2022              Follow-up Information       Follow up With Specialties Details Why Contact Info    Charito Goetz MD Family Medicine Call  Call Monday and let them know that you have been to the ER for elevated BP. [address] [facility]      Claus Tripathi DO Cardiovascular Disease Physician, Interventional Cardiology Physician Schedule an appointment as soon as possible for a visit  As already scheduled.  [address]  [address] [place] [address]  [phone]      [facility] [address], [facility] DEP Emergency Medicine  If symptoms worsen [address] [facility]  [phone]              The patient is asked to follow-up with their primary care provider in the next several days. They are to call tomorrow for an appointment. The patient is asked to return promptly for any increased concerns or worsening of symptoms. They can return to this emergency department or any other emergency department.     Alexia Murray NP  10:43 AM

## 2022-08-21 NOTE — Clinical Note
[address]  [address] [facility] [address]  [phone]    Work/School Note    Date: 8/21/2022    To Whom It May concern:      Breann Fregoso was seen and treated today in the emergency room by the following provider(s):  Attending Provider: Cody Starr DO  Nurse Practitioner: Case Donnelly NP. Breann Fregoso is excused from work/school on 08/21/22. He is clear to return to work/school on 08/22/22. No restrictions.      Sincerely,          Davon Blue NP
age appropriate
age appropriate

## 2022-08-21 NOTE — ED NOTES
Patient discharged home by [facility] NP from the [address] [place] [address].

## 2022-11-26 ENCOUNTER — APPOINTMENT (OUTPATIENT)
Dept: GENERAL RADIOLOGY | Age: 47
End: 2022-11-26
Attending: EMERGENCY MEDICINE
Payer: COMMERCIAL

## 2022-11-26 ENCOUNTER — HOSPITAL ENCOUNTER (EMERGENCY)
Age: 47
Discharge: HOME | End: 2022-11-26
Attending: EMERGENCY MEDICINE
Payer: COMMERCIAL

## 2022-11-26 ENCOUNTER — APPOINTMENT (OUTPATIENT)
Dept: ULTRASOUND IMAGING | Age: 47
End: 2022-11-26
Attending: EMERGENCY MEDICINE
Payer: COMMERCIAL

## 2022-11-26 VITALS
OXYGEN SATURATION: 98 % | TEMPERATURE: 98.7 F | BODY MASS INDEX: 35.09 KG/M2 | SYSTOLIC BLOOD PRESSURE: 176 MMHG | HEIGHT: 75 IN | DIASTOLIC BLOOD PRESSURE: 103 MMHG | RESPIRATION RATE: 20 BRPM | WEIGHT: 282.19 LBS | HEART RATE: 98 BPM

## 2022-11-26 DIAGNOSIS — R03.0 ELEVATED BLOOD PRESSURE READING: ICD-10-CM

## 2022-11-26 DIAGNOSIS — M79.672 LEFT FOOT PAIN: Primary | ICD-10-CM

## 2022-11-26 DIAGNOSIS — M10.9 ACUTE GOUT OF LEFT FOOT, UNSPECIFIED CAUSE: ICD-10-CM

## 2022-11-26 PROCEDURE — 99284 EMERGENCY DEPT VISIT MOD MDM: CPT

## 2022-11-26 PROCEDURE — 73630 X-RAY EXAM OF FOOT: CPT

## 2022-11-26 PROCEDURE — 74011250637 HC RX REV CODE- 250/637: Performed by: EMERGENCY MEDICINE

## 2022-11-26 PROCEDURE — 93971 EXTREMITY STUDY: CPT

## 2022-11-26 RX ORDER — COLCHICINE 0.6 MG/1
1.2 TABLET ORAL
Status: COMPLETED | OUTPATIENT
Start: 2022-11-26 | End: 2022-11-26

## 2022-11-26 RX ORDER — COLCHICINE 0.6 MG/1
0.6 TABLET ORAL DAILY
Qty: 7 TABLET | Refills: 0 | Status: SHIPPED | OUTPATIENT
Start: 2022-11-26

## 2022-11-26 RX ORDER — OXYCODONE AND ACETAMINOPHEN 5; 325 MG/1; MG/1
1 TABLET ORAL
Qty: 12 TABLET | Refills: 0 | Status: SHIPPED | OUTPATIENT
Start: 2022-11-26 | End: 2022-11-29

## 2022-11-26 RX ADMIN — COLCHICINE 1.2 MG: 0.6 TABLET, FILM COATED ORAL at 14:15

## 2022-11-26 NOTE — LETTER
[address]  [facility] EMERGENCY DEPT  [address] [place] [address]  Andreina Meyer [address]  [phone]    Work/School Note    Date: 11/26/2022    To Whom It May concern:    Irene Rivera was seen and treated today in the emergency room by the following provider(s):  Attending Provider: Eloise Koehler MD.      Irene Rivera may return to work on 11/30/22.     Sincerely,          Ruddy Mackenzie MD

## 2022-11-26 NOTE — ED PROVIDER NOTES
EMERGENCY DEPARTMENT HISTORY AND PHYSICAL EXAM      Date: 11/26/2022  Patient Name: Samuel Moreno    History of Presenting Illness     Chief Complaint   Patient presents with    Foot Pain     Pt wheeled into triage with a cc of left foot pain x 2 days; pt has hx of gout        History Provided By: Patient    HPI: Samuel Moreno, 49 y.o. male presents to the ED with cc of left foot pain. 41-year-old male presents emergency department chief plaint of left foot pain. Patient is a history of hypertension and SVT. Follows with Dr. Brianne Adams. Patient reports he did eat heavily for Thanksgiving, today developed severe left foot pain. Denies trauma. Patient reports pain along lateral left forefoot, severe. Worse with bearing weight and improved with nothing. Associated erythema of the skin. No fevers. Denies radiation of the pain. No leg pain. Patient reports he was seen in the emergency department on 11/20 for hypotension thought to be due to polypharmacy. He was advised to hold on his blood pressure medicines. There, labs are unremarkable, notably his creatinine was 1.1. Patient reports he has not been taking his antihypertensives including lisinopril, amlodipine or hydralazine since this as he is been unable to reach his cardiologist. He reports his blood pressure has been elevated. He denies chest pain or increased shortness of breath. There are no other complaints, changes, or physical findings at this time. PCP: Tristan Gao MD    No current facility-administered medications on file prior to encounter. Current Outpatient Medications on File Prior to Encounter   Medication Sig Dispense Refill    lisinopriL (PRINIVIL, ZESTRIL) 40 mg tablet Take 40 mg by mouth two (2) times a day. carvediloL (COREG) 25 mg tablet Take 25 mg by mouth two (2) times daily (with meals). acetaminophen (TYLENOL) 325 mg tablet Take 3 Tablets by mouth every six (6) hours as needed for Pain.  20 Tablet 0 ibuprofen (MOTRIN) 600 mg tablet Take 1 Tablet by mouth every six (6) hours as needed for Pain. 20 Tablet 0    chlorthalidone (HYGROTON) 25 mg tablet Take 50 mg by mouth daily. traZODone (DESYREL) 50 mg tablet Take 50 mg by mouth nightly. amphetamine-dextroamphetamine XR (ADDERALL XR) 20 mg XR capsule Take 20 mg by mouth daily. amlodipine besylate (AMLODIPINE PO) Take 10 mg by mouth daily. Past History     Past Medical History:  Past Medical History:   Diagnosis Date    Advance directive discussed with patient 10/22/2015    IBS (irritable bowel syndrome)     Knee pain, acute 10/02/14     prepatellar bursitis Nimesh Harps 10/09/14    Ruptured tendon 2/2014    left distal biceps tendon (WC injury)       Past Surgical History:  Past Surgical History:   Procedure Laterality Date    HX ORTHOPAEDIC Left 1979    foot injury    [place] EXTRAC ERUPTED TOOTH/EXPOSED ROOT  08/2017    Having teeth pulled and will have more dental work. Family History:  Family History   Problem Relation Age of Onset    Cancer Maternal Grandmother        Social History:  Social History     Tobacco Use    Smoking status: Never    Smokeless tobacco: Never   Substance Use Topics    Alcohol use: Not Currently     Comment: ; weekends    Drug use: No       Allergies:  No Known Allergies      Review of Systems   Review of Systems   Constitutional:  Positive for chills. Negative for fever. HENT:  Negative for voice change. Eyes:  Negative for pain and redness. Respiratory:  Negative for cough and chest tightness. Cardiovascular:  Negative for chest pain and leg swelling. Gastrointestinal:  Negative for abdominal pain, diarrhea, nausea and vomiting. Genitourinary:  Negative for hematuria. Musculoskeletal:  Positive for arthralgias and joint swelling. Negative for gait problem. Skin:  Negative for color change, pallor, rash and wound. Neurological:  Negative for facial asymmetry, weakness and headaches. Hematological:  Does not bruise/bleed easily. Psychiatric/Behavioral:  Negative for behavioral problems. All other systems reviewed and are negative. Physical Exam   Physical Exam  Vitals and nursing note reviewed. Constitutional:       Comments: 43-year-old male, resting in bed, appears mildly uncomfortable   HENT:      Head: Normocephalic and atraumatic. Nose: Nose normal.      Mouth/Throat:      Mouth: Mucous membranes are moist.   Eyes:      Pupils: Pupils are equal, round, and reactive to light. Cardiovascular:      Rate and Rhythm: Regular rhythm. Tachycardia present. Pulses: Normal pulses. Heart sounds: No murmur heard. No friction rub. No gallop. Comments: 2+ DP and PT pulses  Pulmonary:      Effort: Pulmonary effort is normal.      Breath sounds: Normal breath sounds. No wheezing, rhonchi or rales. Abdominal:      General: Abdomen is flat. There is no distension. Palpations: Abdomen is soft. Tenderness: There is no abdominal tenderness. Musculoskeletal:         General: Tenderness present. Normal range of motion. Cervical back: Normal range of motion. Right lower leg: No edema. Left lower leg: No edema. Comments: Left foot is held in neutral position. There is tenderness to palpation along the left lateral foot most pronounced along the dorsal left fifth metatarsal.  Vascularly intact. Faint erythema over the skin. Tender to palpation without crepitus or abscess or skin thickening. No obvious effusion noted. Skin:     General: Skin is warm and dry. Capillary Refill: Capillary refill takes less than 2 seconds. Neurological:      General: No focal deficit present. Mental Status: He is alert. Psychiatric:         Mood and Affect: Mood normal.       Diagnostic Study Results     Labs -   No results found for this or any previous visit (from the past 12 hour(s)).     Radiologic Studies -   XR FOOT LT MIN 3 V   Final Result No acute abnormality. No erosive disease. DUPLEX LOWER EXT VENOUS LEFT   Final Result        CT Results  (Last 48 hours)      None          CXR Results  (Last 48 hours)      None            Medical Decision Making   I am the first provider for this patient. I reviewed the vital signs, available nursing notes, past medical history, past surgical history, family history and social history. Vital Signs-Reviewed the patient's vital signs. Patient Vitals for the past 12 hrs:   Temp Pulse Resp BP SpO2   11/26/22 1520 -- 98 -- -- 98 %   11/26/22 1051 98.7 °F (37.1 °C) (!) 110 20 (!) 176/103 96 %     Records Reviewed: Nursing Notes, Old Medical Records, and Previous Laboratory Studies    Provider Notes (Medical Decision Making):     58-year-old male with history of hypertension presents emergency department left foot pain. I will check a duplex to exclude DVT given swelling, although appears more consistent with gout given recent heavy food intake likely triggering gout flare. Does not appear consistent with septic arthritis clinically, no infectious symptoms. Will medicate with colchicine in ED, as renal function was normal at previous appointment. Reviewed [facility] records, patient is hypertensive here, suspect an element of this is secondary to pain. Declining pain medications here. Given patient's symptomatic hypotension, I advised I think is reasonable to resume his lisinopril and amlodipine but would hold on hydralazine at this time. ED Course:   Initial assessment performed. The patients presenting problems have been discussed, and they are in agreement with the care plan formulated and outlined with them. I have encouraged them to ask questions as they arise throughout their visit. ED Course as of 11/26/22 1644   Sat Nov 26, 2022   1452 Plain film and duplex are negative.  [MB]      ED Course User Index  [MB] Colby Hunt MD     Patient updated, advised to follow-up with PCP or orthopedist.  Woody French to resume lisinopril and amlodipine as I do not think these are contributing to patient's low blood pressure likely hydralazine. Recommend cardiology follow-up and return precautions. Bridgett Frausto MD      Disposition:    Discharged    DISCHARGE PLAN:  1. Discharge Medication List as of 11/26/2022  2:55 PM        START taking these medications    Details   colchicine 0.6 mg tablet Take 1 Tablet by mouth daily. , Normal, Disp-7 Tablet, R-0      oxyCODONE-acetaminophen (Percocet) 5-325 mg per tablet Take 1 Tablet by mouth every six (6) hours as needed for Pain for up to 3 days. Max Daily Amount: 4 Tablets., Normal, Disp-12 Tablet, R-0           CONTINUE these medications which have NOT CHANGED    Details   lisinopriL (PRINIVIL, ZESTRIL) 40 mg tablet Take 40 mg by mouth two (2) times a day., Historical Med      carvediloL (COREG) 25 mg tablet Take 25 mg by mouth two (2) times daily (with meals). , Historical Med      acetaminophen (TYLENOL) 325 mg tablet Take 3 Tablets by mouth every six (6) hours as needed for Pain., Normal, Disp-20 Tablet, R-0      ibuprofen (MOTRIN) 600 mg tablet Take 1 Tablet by mouth every six (6) hours as needed for Pain., Normal, Disp-20 Tablet, R-0      chlorthalidone (HYGROTON) 25 mg tablet Take 50 mg by mouth daily. , Historical Med      traZODone (DESYREL) 50 mg tablet Take 50 mg by mouth nightly., Historical Med      amphetamine-dextroamphetamine XR (ADDERALL XR) 20 mg XR capsule Take 20 mg by mouth daily. , Historical Med      amlodipine besylate (AMLODIPINE PO) Take 10 mg by mouth daily. , Historical Med           2.    Follow-up Information       Follow up With Specialties Details Why Contact Info    Elli Castle MD Family Medicine In 2 days  [address] [place] [address]  [phone]      \[facility]\"" EMERGENCY DEPT Emergency Medicine  If symptoms worsen [address] [address]    Ovidio Fountain,  Orthopedic Surgery In 1 week  [address]  [address]      Nathalie Potter DO Cardiovascular Disease Physician, Interventional Cardiology Physician In 1 week  [address]  [address]  [facility]  [phone]            3. Return to ED if worse     Diagnosis     Clinical Impression:   1. Left foot pain    2. Elevated blood pressure reading    3. Acute gout of left foot, unspecified cause        Attestations:    Ember Mack MD        Please note that this dictation was completed with [employer], the computer voice recognition software. Quite often unanticipated grammatical, syntax, homophones, and other interpretive errors are inadvertently transcribed by the computer software. Please disregard these errors. Please excuse any errors that have escaped final proofreading. Thank you.

## 2022-11-26 NOTE — DISCHARGE INSTRUCTIONS
You were seen in the emergency department for your symptoms. Please follow-up with your primary doctor and the orthopedic doctor. Please use the pain medications as needed and colchicine for gout. Return for new or worsening symptoms anytime. I believe it is safe to resume your lisinopril and amlodipine until you see Dr. Jonathan Cuevas.

## 2023-01-17 ENCOUNTER — HOSPITAL ENCOUNTER (EMERGENCY)
Age: 48
Discharge: HOME | End: 2023-01-17
Attending: EMERGENCY MEDICINE
Payer: COMMERCIAL

## 2023-01-17 VITALS
WEIGHT: 274.47 LBS | HEART RATE: 102 BPM | DIASTOLIC BLOOD PRESSURE: 99 MMHG | SYSTOLIC BLOOD PRESSURE: 150 MMHG | TEMPERATURE: 97.9 F | OXYGEN SATURATION: 96 % | RESPIRATION RATE: 24 BRPM | BODY MASS INDEX: 34.13 KG/M2 | HEIGHT: 75 IN

## 2023-01-17 DIAGNOSIS — M10.9 ACUTE GOUT OF LEFT FOOT, UNSPECIFIED CAUSE: ICD-10-CM

## 2023-01-17 DIAGNOSIS — M10.9 ACUTE GOUT OF RIGHT FOOT, UNSPECIFIED CAUSE: Primary | ICD-10-CM

## 2023-01-17 PROCEDURE — 99283 EMERGENCY DEPT VISIT LOW MDM: CPT

## 2023-01-17 RX ORDER — COLCHICINE 0.6 MG/1
0.6 TABLET ORAL DAILY
Qty: 14 TABLET | Refills: 1 | Status: SHIPPED | OUTPATIENT
Start: 2023-01-17 | End: 2023-01-31

## 2023-01-17 RX ORDER — METHYLPREDNISOLONE 4 MG/1
TABLET ORAL
Qty: 1 DOSE PACK | Refills: 0 | Status: SHIPPED | OUTPATIENT
Start: 2023-01-17

## 2023-01-17 RX ORDER — IBUPROFEN 600 MG/1
600 TABLET ORAL
Qty: 20 TABLET | Refills: 0 | Status: SHIPPED | OUTPATIENT
Start: 2023-01-17

## 2023-01-17 NOTE — ED PROVIDER NOTES
[facility] EMERGENCY DEPT  EMERGENCY DEPARTMENT ENCOUNTER       Pt Name: Amber Sanchez  MRN: [id number]  [place]orte[place] 1975  Date of evaluation: 1/17/2023  Provider: Bryson Mcfarlane MD   PCP: Alley Wilkins MD  Note Started: 1:30 PM 1/17/23     CHIEF COMPLAINT       Chief Complaint   Patient presents with    Gout     Right and left foot        HISTORY OF PRESENT ILLNESS: 1 or more elements      History From: Patient  HPI Limitations : None     Amber Sanchez is a 49 y.o. male who presents complaining of pain in the bilateral feet, right greater than left, consistent with prior flareups of gout. Pain is burning, worsened by trying to bear weight or walk. Nursing Notes were all reviewed and agreed with or any disagreements were addressed in the HPI. REVIEW OF SYSTEMS      Review of Systems     Positives and Pertinent negatives as per HPI. PAST HISTORY     Past Medical History:  Past Medical History:   Diagnosis Date    Advance directive discussed with patient 10/22/2015    IBS (irritable bowel syndrome)     Knee pain, acute 10/02/14     prepatellar bursitis Etheleen Lambing 10/09/14    Ruptured tendon 2/2014    left distal biceps tendon (WC injury)       Past Surgical History:  Past Surgical History:   Procedure Laterality Date    HX ORTHOPAEDIC Left 1979    foot injury    [place] EXTRAC ERUPTED TOOTH/EXPOSED ROOT  08/2017    Having teeth pulled and will have more dental work.        Family History:  Family History   Problem Relation Age of Onset    Cancer Maternal Grandmother        Social History:  Social History     Tobacco Use    Smoking status: Never    Smokeless tobacco: Never   Substance Use Topics    Alcohol use: Not Currently     Comment: ; weekends    Drug use: No       Allergies:  No Known Allergies    CURRENT MEDICATIONS      Discharge Medication List as of 1/17/2023 10:09 AM        CONTINUE these medications which have NOT CHANGED    Details   lisinopriL (PRINIVIL, ZESTRIL) 40 mg tablet Take 40 mg by mouth two (2) times a day., Historical Med      carvediloL (COREG) 25 mg tablet Take 25 mg by mouth two (2) times daily (with meals). , Historical Med      acetaminophen (TYLENOL) 325 mg tablet Take 3 Tablets by mouth every six (6) hours as needed for Pain., Normal, Disp-20 Tablet, R-0      chlorthalidone (HYGROTON) 25 mg tablet Take 50 mg by mouth daily. , Historical Med      traZODone (DESYREL) 50 mg tablet Take 50 mg by mouth nightly., Historical Med      amphetamine-dextroamphetamine XR (ADDERALL XR) 20 mg XR capsule Take 20 mg by mouth daily. , Historical Med      amlodipine besylate (AMLODIPINE PO) Take 10 mg by mouth daily. , Historical Med             PHYSICAL EXAM      ED Triage Vitals   ED Encounter Vitals Group      BP 01/17/23 0914 (!) 150/99      Pulse (Heart Rate) 01/17/23 0914 (!) 102      Resp Rate 01/17/23 0914 24      Temp 01/17/23 0914 97.9 °F (36.6 °C)      Temp src --       O2 Sat (%) 01/17/23 0914 96 %      Weight 01/17/23 0915 274 lb 7.6 oz      Height 01/17/23 0915 6' 3\"        Physical Exam  Vitals reviewed. Constitutional:       General: He is not in acute distress. Appearance: He is obese. He is not ill-appearing. Musculoskeletal:      Right foot: Normal range of motion and normal capillary refill. Swelling and tenderness present. Normal pulse. Left foot: Normal range of motion and normal capillary refill. Swelling and tenderness present. Normal pulse. Skin:     Capillary Refill: Capillary refill takes less than 2 seconds. Neurological:      General: No focal deficit present. Mental Status: He is oriented to person, place, and time. DIAGNOSTIC RESULTS   LABS:     No results found for this or any previous visit (from the past 12 hour(s)).      EKG:      RADIOLOGY:  Non-plain film images such as CT, Ultrasound and MRI are read by the radiologist. Plain radiographic images are visualized and preliminarily interpreted by the ED Provider with the below findings:          Interpretation per the Radiologist below, if available at the time of this note:     No results found. PROCEDURES   Unless otherwise noted below, none  Procedures     CRITICAL CARE TIME       EMERGENCY DEPARTMENT COURSE and DIFFERENTIAL DIAGNOSIS/MDM   Vitals:    Vitals:    01/17/23 0914 01/17/23 0915   BP: (!) 150/99    Pulse: (!) 102    Resp: 24    Temp: 97.9 °F (36.6 °C)    SpO2: 96%    Weight:  124.5 kg (274 lb 7.6 oz)   Height:  6' 3\" (1.905 m)        Patient was given the following medications:  Medications - No data to display    CONSULTS: (Who and What was discussed)  None    Chronic Conditions:     Social Determinants affecting Dx or Tx:     Records Reviewed (source and summary of external notes):     CC/HPI Summary, DDx, ED Course, and Reassessment: 43-year-old male with obesity and history of gout complaining of pain in the bilateral feet, right greater than left, worsening for the past few days, now described as sharp and severe and burning. Pain is worsened by ambulating. He has taken ibuprofen and colchicine in the past with good improvement in his symptoms. No laboratories or imaging is indicated today. Feet demonstrate mild bilateral swelling, no significant erythema. Warm and well-perfused. No concerning skin breakdown or deformities or signs of injury. No clinical evidence of cellulitis or infectious process. He declined medications to be administered in the emergency department. I prescribed prednisone, colchicine, Percocet. Disposition Considerations (Tests not done, Shared Decision Making, Pt Expectation of Test or Tx.):      FINAL IMPRESSION     1. Acute gout of right foot, unspecified cause    2. Acute gout of left foot, unspecified cause          DISPOSITION/PLAN   Discharged    Discharge Note: The patient is stable for discharge home.  The signs, symptoms, diagnosis, and discharge instructions have been discussed, understanding conveyed, and agreed upon. The patient is to follow up as recommended or return to ER should their symptoms worsen. PATIENT REFERRED TO:  Follow-up Information       Follow up With Specialties Details Why Contact Info    Nathaly Taylor, [address]  [address] [phone]      [facility] EMERGENCY DEPT Emergency Medicine   [address] [place] [address]  [phone]              DISCHARGE MEDICATIONS:  Discharge Medication List as of 1/17/2023 10:09 AM        START taking these medications    Details   methylPREDNISolone (Medrol, Edward,) 4 mg tablet Follow package instructions, Normal, Disp-1 Dose Pack, R-0           CONTINUE these medications which have CHANGED    Details   colchicine 0.6 mg tablet Take 1 Tablet by mouth daily for 14 days. , Normal, Disp-14 Tablet, R-1      ibuprofen (MOTRIN) 600 mg tablet Take 1 Tablet by mouth every six (6) hours as needed for Pain., Normal, Disp-20 Tablet, R-0           CONTINUE these medications which have NOT CHANGED    Details   lisinopriL (PRINIVIL, ZESTRIL) 40 mg tablet Take 40 mg by mouth two (2) times a day., Historical Med      carvediloL (COREG) 25 mg tablet Take 25 mg by mouth two (2) times daily (with meals). , Historical Med      acetaminophen (TYLENOL) 325 mg tablet Take 3 Tablets by mouth every six (6) hours as needed for Pain., Normal, Disp-20 Tablet, R-0      chlorthalidone (HYGROTON) 25 mg tablet Take 50 mg by mouth daily. , Historical Med      traZODone (DESYREL) 50 mg tablet Take 50 mg by mouth nightly., Historical Med      amphetamine-dextroamphetamine XR (ADDERALL XR) 20 mg XR capsule Take 20 mg by mouth daily. , Historical Med      amlodipine besylate (AMLODIPINE PO) Take 10 mg by mouth daily. , Historical Med               DISCONTINUED MEDICATIONS:  Discharge Medication List as of 1/17/2023 10:09 AM          I am the Primary Clinician of Record.    Beena Partida MD (electronically signed)    (Please note that parts of this dictation were completed with voice recognition software. Quite often unanticipated grammatical, syntax, homophones, and other interpretive errors are inadvertently transcribed by the computer software. Please disregards these errors.  Please excuse any errors that have escaped final proofreading.)

## 2023-01-17 NOTE — Clinical Note
[address]  [facility] EMERGENCY DEPT  [address] [place] [address]  Vitor Bledsoe [id number] [phone]    Work/School Note    Date: 1/17/2023    To Whom It May concern:    Lei Hdz was seen and treated today in the emergency room by the following provider(s):  Attending Provider: Kathy Toro MD.      Lei Hdz is excused from work/school on 1/17/2023 through 1/19/2023. He is medically clear to return to work/school on 1/20/2023. Mr. Senthil Palacios was seen in the ER on Tuesday. Please excuse from work for the next 48 hours. He can return to work on Friday Jan. 20th but may need to take frequent breaks from standing on his feet.     Sincerely,          Baron Christian MD

## 2023-02-24 ENCOUNTER — APPOINTMENT (OUTPATIENT)
Dept: CT IMAGING | Age: 48
End: 2023-02-24
Attending: STUDENT IN AN ORGANIZED HEALTH CARE EDUCATION/TRAINING PROGRAM
Payer: COMMERCIAL

## 2023-02-24 ENCOUNTER — APPOINTMENT (OUTPATIENT)
Dept: GENERAL RADIOLOGY | Age: 48
End: 2023-02-24
Attending: STUDENT IN AN ORGANIZED HEALTH CARE EDUCATION/TRAINING PROGRAM
Payer: COMMERCIAL

## 2023-02-24 ENCOUNTER — HOSPITAL ENCOUNTER (EMERGENCY)
Age: 48
Discharge: HOME | End: 2023-02-24
Attending: STUDENT IN AN ORGANIZED HEALTH CARE EDUCATION/TRAINING PROGRAM
Payer: COMMERCIAL

## 2023-02-24 VITALS
TEMPERATURE: 97.7 F | DIASTOLIC BLOOD PRESSURE: 65 MMHG | RESPIRATION RATE: 16 BRPM | HEART RATE: 69 BPM | SYSTOLIC BLOOD PRESSURE: 115 MMHG | OXYGEN SATURATION: 97 % | BODY MASS INDEX: 34.72 KG/M2 | WEIGHT: 277.78 LBS

## 2023-02-24 DIAGNOSIS — E86.0 DEHYDRATION: ICD-10-CM

## 2023-02-24 DIAGNOSIS — I95.9 HYPOTENSION, UNSPECIFIED HYPOTENSION TYPE: Primary | ICD-10-CM

## 2023-02-24 LAB
ALBUMIN SERPL-MCNC: 3.9 G/DL (ref 3.5–5)
ALBUMIN/GLOB SERPL: 1.1 (ref 1.1–2.2)
ALP SERPL-CCNC: 82 U/L (ref 45–117)
ALT SERPL-CCNC: 41 U/L (ref 12–78)
ANION GAP SERPL CALC-SCNC: 8 MMOL/L (ref 5–15)
AST SERPL-CCNC: 21 U/L (ref 15–37)
BASOPHILS # BLD: 0 K/UL (ref 0–0.1)
BASOPHILS NFR BLD: 0 % (ref 0–1)
BILIRUB SERPL-MCNC: 0.6 MG/DL (ref 0.2–1)
BNP SERPL-MCNC: 239 PG/ML
BUN SERPL-MCNC: 17 MG/DL (ref 6–20)
BUN/CREAT SERPL: 10 (ref 12–20)
CALCIUM SERPL-MCNC: 9.2 MG/DL (ref 8.5–10.1)
CHLORIDE SERPL-SCNC: 101 MMOL/L (ref 97–108)
CO2 SERPL-SCNC: 27 MMOL/L (ref 21–32)
CREAT SERPL-MCNC: 1.69 MG/DL (ref 0.7–1.3)
DIFFERENTIAL METHOD BLD: ABNORMAL
EOSINOPHIL # BLD: 0.1 K/UL (ref 0–0.4)
EOSINOPHIL NFR BLD: 1 % (ref 0–7)
ERYTHROCYTE [DISTWIDTH] IN BLOOD BY AUTOMATED COUNT: 13.9 % (ref 11.5–14.5)
GLOBULIN SER CALC-MCNC: 3.6 G/DL (ref 2–4)
GLUCOSE BLD STRIP.AUTO-MCNC: 177 MG/DL (ref 65–117)
GLUCOSE SERPL-MCNC: 186 MG/DL (ref 65–100)
HCT VFR BLD AUTO: 40.5 % (ref 36.6–50.3)
HGB BLD-MCNC: 13.3 G/DL (ref 12.1–17)
IMM GRANULOCYTES # BLD AUTO: 0.1 K/UL (ref 0–0.04)
IMM GRANULOCYTES NFR BLD AUTO: 1 % (ref 0–0.5)
INR PPP: 1 (ref 0.9–1.1)
LYMPHOCYTES # BLD: 1.3 K/UL (ref 0.8–3.5)
LYMPHOCYTES NFR BLD: 11 % (ref 12–49)
MCH RBC QN AUTO: 27.5 PG (ref 26–34)
MCHC RBC AUTO-ENTMCNC: 32.8 G/DL (ref 30–36.5)
MCV RBC AUTO: 83.9 FL (ref 80–99)
MONOCYTES # BLD: 0.5 K/UL (ref 0–1)
MONOCYTES NFR BLD: 4 % (ref 5–13)
NEUTS SEG # BLD: 10.2 K/UL (ref 1.8–8)
NEUTS SEG NFR BLD: 83 % (ref 32–75)
NRBC # BLD: 0 K/UL (ref 0–0.01)
NRBC BLD-RTO: 0 PER 100 WBC
PLATELET # BLD AUTO: 309 K/UL (ref 150–400)
PMV BLD AUTO: 9.2 FL (ref 8.9–12.9)
POTASSIUM SERPL-SCNC: 4.4 MMOL/L (ref 3.5–5.1)
PROT SERPL-MCNC: 7.5 G/DL (ref 6.4–8.2)
PROTHROMBIN TIME: 10.4 SEC (ref 9–11.1)
RBC # BLD AUTO: 4.83 M/UL (ref 4.1–5.7)
SERVICE CMNT-IMP: ABNORMAL
SODIUM SERPL-SCNC: 136 MMOL/L (ref 136–145)
TROPONIN I SERPL HS-MCNC: 7 NG/L (ref 0–76)
WBC # BLD AUTO: 12.2 K/UL (ref 4.1–11.1)

## 2023-02-24 PROCEDURE — 84484 ASSAY OF TROPONIN QUANT: CPT

## 2023-02-24 PROCEDURE — 85610 PROTHROMBIN TIME: CPT

## 2023-02-24 PROCEDURE — 96360 HYDRATION IV INFUSION INIT: CPT

## 2023-02-24 PROCEDURE — 93005 ELECTROCARDIOGRAM TRACING: CPT

## 2023-02-24 PROCEDURE — 96361 HYDRATE IV INFUSION ADD-ON: CPT

## 2023-02-24 PROCEDURE — 70450 CT HEAD/BRAIN W/O DYE: CPT

## 2023-02-24 PROCEDURE — 85025 COMPLETE CBC W/AUTO DIFF WBC: CPT

## 2023-02-24 PROCEDURE — 83880 ASSAY OF NATRIURETIC PEPTIDE: CPT

## 2023-02-24 PROCEDURE — 99285 EMERGENCY DEPT VISIT HI MDM: CPT

## 2023-02-24 PROCEDURE — 82962 GLUCOSE BLOOD TEST: CPT

## 2023-02-24 PROCEDURE — 80053 COMPREHEN METABOLIC PANEL: CPT

## 2023-02-24 PROCEDURE — 74011000636 HC RX REV CODE- 636: Performed by: STUDENT IN AN ORGANIZED HEALTH CARE EDUCATION/TRAINING PROGRAM

## 2023-02-24 PROCEDURE — 70496 CT ANGIOGRAPHY HEAD: CPT

## 2023-02-24 PROCEDURE — 36415 COLL VENOUS BLD VENIPUNCTURE: CPT

## 2023-02-24 PROCEDURE — 0042T CT CODE NEURO PERF W CBF: CPT

## 2023-02-24 PROCEDURE — 74011250636 HC RX REV CODE- 250/636: Performed by: STUDENT IN AN ORGANIZED HEALTH CARE EDUCATION/TRAINING PROGRAM

## 2023-02-24 PROCEDURE — 71045 X-RAY EXAM CHEST 1 VIEW: CPT

## 2023-02-24 RX ADMIN — SODIUM CHLORIDE 1000 ML: 9 INJECTION, SOLUTION INTRAVENOUS at 14:17

## 2023-02-24 RX ADMIN — IOPAMIDOL 100 ML: 755 INJECTION, SOLUTION INTRAVENOUS at 12:58

## 2023-02-24 RX ADMIN — SODIUM CHLORIDE 1000 ML: 9 INJECTION, SOLUTION INTRAVENOUS at 13:15

## 2023-02-24 NOTE — ED PROVIDER NOTES
EMERGENCY DEPARTMENT HISTORY AND PHYSICAL EXAM      Date: 2/24/2023  Patient Name: Fidencio Castleman    History of Presenting Illness     Chief Complaint   Patient presents with    Hypotension    Dizziness     Pt noted his blood pressure very high at work about 0945 am 206/?; pt very dizzy walking in to triage. He took his Coreg, Lisinopril and Hydralazine and Amlodipine now his Bp is low. Pt is very unsteady gait; slurring. Pt bp at work went down to 70s/     History Provided By: Patient    HPI: Fidencio Castleman, 49 y.o. male with a past medical history significant for medical problems as stated below presents to the ED with cc of dizziness and slurred speech. He was brought to the emergency department by his coworker. He and his coworker reported that around 9:30 AM he found that he was extremely hypertensive with systolic blood pressures of 210. At that time he took his carvedilol, lisinopril, hydralazine, and note him and amlodipine. His blood pressure immediately dropped. He began feeling dizzy where slurred speech at about 9:30 and this did not improve so he presented to the emergency department. He denies any recent infectious symptoms such as fever, chills, SOB, cough, nausea, vomiting, or diarrhea. Denies chest pain. PCP: James Alvarez MD    No current facility-administered medications on file prior to encounter. Current Outpatient Medications on File Prior to Encounter   Medication Sig Dispense Refill    ibuprofen (MOTRIN) 600 mg tablet Take 1 Tablet by mouth every six (6) hours as needed for Pain. 20 Tablet 0    methylPREDNISolone (Medrol, Edward,) 4 mg tablet Follow package instructions 1 Dose Pack 0    lisinopriL (PRINIVIL, ZESTRIL) 40 mg tablet Take 40 mg by mouth two (2) times a day. carvediloL (COREG) 25 mg tablet Take 25 mg by mouth two (2) times daily (with meals). acetaminophen (TYLENOL) 325 mg tablet Take 3 Tablets by mouth every six (6) hours as needed for Pain.  20 Tablet 0 chlorthalidone (HYGROTON) 25 mg tablet Take 50 mg by mouth daily. traZODone (DESYREL) 50 mg tablet Take 50 mg by mouth nightly. amphetamine-dextroamphetamine XR (ADDERALL XR) 20 mg XR capsule Take 20 mg by mouth daily. amlodipine besylate (AMLODIPINE PO) Take 10 mg by mouth daily. Past History     Past Medical History:  Past Medical History:   Diagnosis Date    Advance directive discussed with patient 10/22/2015    IBS (irritable bowel syndrome)     Knee pain, acute 10/02/14     prepatellar bursitis Marga Sit 10/09/14    Ruptured tendon 2/2014    left distal biceps tendon (WC injury)       Past Surgical History:  Past Surgical History:   Procedure Laterality Date    HX ORTHOPAEDIC Left 1979    foot injury    LA EXTRAC ERUPTED TOOTH/EXPOSED ROOT  08/2017    Having teeth pulled and will have more dental work.        Family History:  Family History   Problem Relation Age of Onset    Cancer Maternal Grandmother        Social History:  Social History     Tobacco Use    Smoking status: Never    Smokeless tobacco: Never   Substance Use Topics    Alcohol use: Not Currently     Comment: ; weekends    Drug use: No     Allergies:  No Known Allergies    Review of Systems   Review of Systems  Per HPI    Physical Exam   Physical Exam  Vital signs reviewed and documented in EMR  Hypotensive  Slightly lethargic, patient in NAD  Dry mucous membranes  Face symmetric  Slight slurring of words  5/5 strength throughout  Sensory intact throughout  Difficulty with balance when attempting to walk  No tachycardia  Abdomen nondistended    Diagnostic Study Results     Labs -  Recent Results (from the past 24 hour(s))   TROPONIN-HIGH SENSITIVITY    Collection Time: 02/24/23 12:15 PM   Result Value Ref Range    Troponin-High Sensitivity 7 0 - 76 ng/L   GLUCOSE, POC    Collection Time: 02/24/23 12:18 PM   Result Value Ref Range    Glucose (POC) 177 (H) 65 - 117 mg/dL    Performed by Ayla García \"Jade\"    CBC WITH AUTOMATED DIFF    Collection Time: 02/24/23 12:23 PM   Result Value Ref Range    WBC 12.2 (H) 4.1 - 11.1 K/uL    RBC 4.83 4.10 - 5.70 M/uL    HGB 13.3 12.1 - 17.0 g/dL    HCT 40.5 36.6 - 50.3 %    MCV 83.9 80.0 - 99.0 FL    MCH 27.5 26.0 - 34.0 PG    MCHC 32.8 30.0 - 36.5 g/dL    RDW 13.9 11.5 - 14.5 %    PLATELET 838 885 - 047 K/uL    MPV 9.2 8.9 - 12.9 FL    NRBC 0.0 0  WBC    ABSOLUTE NRBC 0.00 0.00 - 0.01 K/uL    NEUTROPHILS 83 (H) 32 - 75 %    LYMPHOCYTES 11 (L) 12 - 49 %    MONOCYTES 4 (L) 5 - 13 %    EOSINOPHILS 1 0 - 7 %    BASOPHILS 0 0 - 1 %    IMMATURE GRANULOCYTES 1 (H) 0.0 - 0.5 %    ABS. NEUTROPHILS 10.2 (H) 1.8 - 8.0 K/UL    ABS. LYMPHOCYTES 1.3 0.8 - 3.5 K/UL    ABS. MONOCYTES 0.5 0.0 - 1.0 K/UL    ABS. EOSINOPHILS 0.1 0.0 - 0.4 K/UL    ABS. BASOPHILS 0.0 0.0 - 0.1 K/UL    ABS. IMM. GRANS. 0.1 (H) 0.00 - 0.04 K/UL    DF AUTOMATED     METABOLIC PANEL, COMPREHENSIVE    Collection Time: 02/24/23 12:23 PM   Result Value Ref Range    Sodium 136 136 - 145 mmol/L    Potassium 4.4 3.5 - 5.1 mmol/L    Chloride 101 97 - 108 mmol/L    CO2 27 21 - 32 mmol/L    Anion gap 8 5 - 15 mmol/L    Glucose 186 (H) 65 - 100 mg/dL    BUN 17 6 - 20 MG/DL    Creatinine 1.69 (H) 0.70 - 1.30 MG/DL    BUN/Creatinine ratio 10 (L) 12 - 20      eGFR 49 (L) >60 ml/min/1.73m2    Calcium 9.2 8.5 - 10.1 MG/DL    Bilirubin, total 0.6 0.2 - 1.0 MG/DL    ALT (SGPT) 41 12 - 78 U/L    AST (SGOT) 21 15 - 37 U/L    Alk.  phosphatase 82 45 - 117 U/L    Protein, total 7.5 6.4 - 8.2 g/dL    Albumin 3.9 3.5 - 5.0 g/dL    Globulin 3.6 2.0 - 4.0 g/dL    A-G Ratio 1.1 1.1 - 2.2     NT-PRO BNP    Collection Time: 02/24/23 12:23 PM   Result Value Ref Range    NT pro- (H) <125 PG/ML   PROTHROMBIN TIME + INR    Collection Time: 02/24/23 12:30 PM   Result Value Ref Range    INR 1.0 0.9 - 1.1      Prothrombin time 10.4 9.0 - 11.1 sec   EKG, 12 LEAD, INITIAL    Collection Time: 02/24/23  1:17 PM   Result Value Ref Range    Ventricular Rate 75 BPM    Atrial Rate 75 BPM    P-R Interval 154 ms    QRS Duration 108 ms    Q-T Interval 456 ms    QTC Calculation (Bezet) 509 ms    Calculated P Axis 39 degrees    Calculated R Axis 61 degrees    Calculated T Axis 46 degrees    Diagnosis       Normal sinus rhythm  Cannot rule out Anterior infarct , age undetermined  When compared with ECG of 19-AUG-2022 19:29,  premature ventricular complexes are no longer present         Radiologic Studies -   XR CHEST PORT   Final Result   Mild interstitial prominence, increased or new since older studies. No focal   infiltrate. .  . CTA CODE NEURO HEAD AND NECK W CONT   Final Result   CTA Head:   1. No evidence of significant stenosis or aneurysm. CTA Neck:   1. No evidence of significant stenosis. CT Brain Perfusion:   1. No acute abnormality. CT CODE NEURO PERF W CBF   Final Result   CTA Head:   1. No evidence of significant stenosis or aneurysm. CTA Neck:   1. No evidence of significant stenosis. CT Brain Perfusion:   1. No acute abnormality. CT CODE NEURO HEAD WO CONTRAST   Final Result   No acute intracranial process identified        CT Results  (Last 48 hours)                 02/24/23 1256  CTA CODE NEURO HEAD AND NECK W CONT Final result    Impression:  CTA Head:   1. No evidence of significant stenosis or aneurysm. CTA Neck:   1. No evidence of significant stenosis. CT Brain Perfusion:   1. No acute abnormality. Narrative:  *PRELIMINARY REPORT*       Perfusion defect in the left temporal lobe       Preliminary report was provided by Dr. Cong Dorado, the on-call radiologist, at 1312   hours       Final report to follow. *END PRELIMINARY REPORT*               EXAM:  CTA CODE NEURO HEAD AND NECK W CONT, CT CODE NEURO PERF W CBF       INDICATION:   Code Stroke       COMPARISON:  CT head 2/24/2023. CONTRAST:  100 mL of Isovue-370.        TECHNIQUE:  Unenhanced  images were obtained to localize the volume for   acquisition. Multislice helical axial CT angiography was performed from the   aortic arch to the top of the head during uneventful rapid bolus intravenous   contrast administration. Coronal and sagittal reformations and 3D post   processing was performed. CT dose reduction was achieved through use of a   standardized protocol tailored for this examination and automatic exposure   control for dose modulation. CT brain perfusion was performed with generation of hemodynamic maps of multiple   parameters, including cerebral blood flow, cerebral blood volume, and MTT (mean   transit time). CT dose reduction was achieved through use of a standardized   protocol tailored for this examination and automatic exposure control for dose   modulation. This study was analyzed by the [address]  algorithm. FINDINGS:       CTA Head:   There is no evidence of large vessel occlusion or flow-limiting stenosis of the   intracranial internal carotid, anterior cerebral, and middle cerebral arteries. The anterior communicating artery is patent. There is no evidence of large vessel occlusion or flow-limiting stenosis of the   intracranial vertebral arteries, basilar artery, or posterior cerebral arteries. The left posterior communicating artery is patent, the right is not seen. There is no evidence of aneurysm or vascular malformation. The dural venous   sinuses and deep cerebral venous system are patent. No evidence of abnormal   enhancement on delayed phase images. Chronic left maxillary sinusitis with near   complete opacification. CTA NECK:   NASCET method was utilized for calculating stenosis. The aortic arch is unremarkable. The common carotid arteries demonstrate no   significant stenosis. There is no evidence of significant stenosis in the   cervical right internal carotid artery. There is no evidence of significant   stenosis in the cervical left internal carotid artery. There is a codominant vertebrobasilar arterial system. The cervical vertebral   arteries are normal in course, size and contour without significant stenosis. Visualized soft tissues of the neck are unremarkable. Visualized lung apices are   clear. No acute fracture or aggressive osseous lesion. Mild degenerative disc   disease at C6-C7. Numerous periapical lucencies and dental caries of the   maxillary and mandibular teeth. CT Brain Perfusion:   There are no regional areas of elevated Tmax, decreased cerebral blood flow or   blood volume. Indicated area of elevated Tmax in the left anterior temporal lobe   is favored to be artifactual.   rCBF < 30% = 0 cc. Tmax > 6 seconds = 3 cc.           02/24/23 1256  CT CODE NEURO PERF W CBF Final result    Impression:  CTA Head:   1. No evidence of significant stenosis or aneurysm. CTA Neck:   1. No evidence of significant stenosis. CT Brain Perfusion:   1. No acute abnormality. Narrative:  *PRELIMINARY REPORT*       Perfusion defect in the left temporal lobe       Preliminary report was provided by Dr. Inga Coe, the on-call radiologist, at 1312   hours       Final report to follow. *END PRELIMINARY REPORT*               EXAM:  CTA CODE NEURO HEAD AND NECK W CONT, CT CODE NEURO PERF W CBF       INDICATION:   Code Stroke       COMPARISON:  CT head 2/24/2023. CONTRAST:  100 mL of Isovue-370. TECHNIQUE:  Unenhanced  images were obtained to localize the volume for   acquisition. Multislice helical axial CT angiography was performed from the   aortic arch to the top of the head during uneventful rapid bolus intravenous   contrast administration. Coronal and sagittal reformations and 3D post   processing was performed. CT dose reduction was achieved through use of a   standardized protocol tailored for this examination and automatic exposure   control for dose modulation.        CT brain perfusion was performed with generation of hemodynamic maps of multiple   parameters, including cerebral blood flow, cerebral blood volume, and MTT (mean   transit time). CT dose reduction was achieved through use of a standardized   protocol tailored for this examination and automatic exposure control for dose   modulation. This study was analyzed by the [address]  algorithm. FINDINGS:       CTA Head:   There is no evidence of large vessel occlusion or flow-limiting stenosis of the   intracranial internal carotid, anterior cerebral, and middle cerebral arteries. The anterior communicating artery is patent. There is no evidence of large vessel occlusion or flow-limiting stenosis of the   intracranial vertebral arteries, basilar artery, or posterior cerebral arteries. The left posterior communicating artery is patent, the right is not seen. There is no evidence of aneurysm or vascular malformation. The dural venous   sinuses and deep cerebral venous system are patent. No evidence of abnormal   enhancement on delayed phase images. Chronic left maxillary sinusitis with near   complete opacification. CTA NECK:   NASCET method was utilized for calculating stenosis. The aortic arch is unremarkable. The common carotid arteries demonstrate no   significant stenosis. There is no evidence of significant stenosis in the   cervical right internal carotid artery. There is no evidence of significant   stenosis in the cervical left internal carotid artery. There is a codominant vertebrobasilar arterial system. The cervical vertebral   arteries are normal in course, size and contour without significant stenosis. Visualized soft tissues of the neck are unremarkable. Visualized lung apices are   clear. No acute fracture or aggressive osseous lesion. Mild degenerative disc   disease at C6-C7. Numerous periapical lucencies and dental caries of the   maxillary and mandibular teeth.        CT Brain Perfusion:   There are no regional areas of elevated Tmax, decreased cerebral blood flow or   blood volume. Indicated area of elevated Tmax in the left anterior temporal lobe   is favored to be artifactual.   rCBF < 30% = 0 cc. Tmax > 6 seconds = 3 cc.           02/24/23 1231  CT CODE NEURO HEAD WO CONTRAST Final result    Impression:  No acute intracranial process identified       Narrative:  EXAM: CT CODE NEURO HEAD WO CONTRAST       INDICATION: Code Stroke       COMPARISON: 2021. CONTRAST: None. TECHNIQUE: Unenhanced CT of the head was performed using 5 mm images. Brain and   bone windows were generated. Coronal and sagittal reformats. CT dose reduction   was achieved through use of a standardized protocol tailored for this   examination and automatic exposure control for dose modulation. FINDINGS:   The ventricles and sulci are normal in size, shape and configuration. There is   no significant white matter disease. There is no intracranial hemorrhage,   extra-axial collection, or mass effect. The basilar cisterns are open. No CT   evidence of acute infarct. The bone windows demonstrate no abnormalities. There is complete opacification   of the left maxillary sinus. The right maxillary sinus demonstrates mild mucosal   thickening. .                 CXR Results  (Last 48 hours)                 02/24/23 1315  XR CHEST PORT Final result    Impression:  Mild interstitial prominence, increased or new since older studies. No focal   infiltrate. .  . Narrative:  INDICATION:  chest pain        EXAM: Chest single view. COMPARISON: 3/23/2022. 1/29/2016. 9/26/2021       FINDINGS: A single frontal view of the chest at 1307 hours shows mild   interstitial prominence increased especially since 2016 and 2021. The heart,   mediastinum and pulmonary vasculature are mildly enlarged, possibly increased   since 2016 . The bony thorax is unremarkable for age.  .                     Medical Decision Making   I am the first provider for this patient. I reviewed the vital signs, available nursing notes, past medical history, past surgical history, family history and social history. Vital Signs-Reviewed the patient's vital signs. Patient Vitals for the past 12 hrs:   Pulse Resp BP SpO2   02/24/23 1914 69 16 115/65 97 %     Records Reviewed: Nursing records and medical records reviewed    Medical Decision Making  Patient is a 41-year-old male with past medical history of hypertension who was hypertensive earlier today and then took all of his blood pressure medications at 1 time. He then began having symptoms of dizziness and slurring of his words with associated hypotension. I think this is likely all due to his taking his blood pressure medications. On top of this think he is dehydrated. He did have ataxia and dysarthria on my initial evaluation, but after giving IV fluids he did not have any neurodeficits on evaluation by teleneurology. Work-up showed an SHELLEY which I suspect is prerenal.  He has a leukocytosis which I suspect is probably reactive to this acute event rather than representative of infection. Neurology did not recommend any further stroke workup after negative CT and CTA imaging. His BP improved with IVF. Unfortunately he did have infiltration at his left arm IV site and orthopedic surgery was consulted to evlauate for compartment syndrome, but I think this is less likely with good pulse, good sensation, and only infusion of < 1L NS in his forearm. Amount and/or Complexity of Data Reviewed  Labs: ordered. Decision-making details documented in ED Course. Radiology: ordered. Decision-making details documented in ED Course. ECG/medicine tests: ordered.      Details: EKG was sinus rhythm, heart rate 75, normal axis, prolonged QTc of 509, possible biphasic TWI in anterior leads  Discussion of management or test interpretation with external provider(s): Discussion with Dr. Jasmina Ross, teleneurology    Risk  Prescription drug management. ED Course:   Initial assessment performed. The patients presenting problems have been discussed, and they are in agreement with the care plan formulated and outlined with them. I have encouraged them to ask questions as they arise throughout their visit. ED Course as of 02/25/23 0452   Fri Feb 24, 2023   1231 Spoke to Dr. Rose Espinoza with teleneurolog who will see the patient. She agrees he is likely not a TNK candidate as this is less likely an embolic infarct. [WB]   3920 Cts and CTA unremarkable. Preliminary report had concerns for perfusion defect in temporal lobe, but this was thought to be artifactual on final report. Patient improved on Dr. Ortega Mt examintation. No dysarthria and was able to walk. She does not recommend MRI. Will reassess after fluid repletion.   [WB]   1558 Patient was reassessed and was found to have significant left arm swelling, left arm tightness, significant pain, color change of left wrist with delayed CR of left hand/fingers. Sensation was intact with 2+ left radial pulse. Due to IV infiltrating, infusion was immediately stopped and IV removed. Patient's hypotension had improved. His arm was elevated and Juliana Darting with orthopedic surgery was immediately consulted with concern for compartment syndrome. Patient's swelling improving and patient now able to move arm more. [WB]   1923 Pt signed out to me. He was observed for a few hours, seen by Ortho PA Juliana Darting, and pt feels fine, no pain. Stable for D/C home, no concern for compartment syndrome.  [DB]      ED Course User Index  [DB] Cristobal Garrido MD  [WB] Charity Medina MD       Medications Administered       iopamidoL (ISOVUE-370) 370 mg iodine /mL (76 %) injection 100 mL       Admin Date  02/24/2023 Action  Given Dose  100 mL Route  IntraVENous Administered By  Christy HUFF              sodium chloride 0.9 % bolus infusion 1,000 mL Admin Date  02/24/2023 Action  New Bag Dose  1,000 mL Route  IntraVENous Administered By  Giacomo Mosqueda RN               Admin Date  02/24/2023 Action  New Bag Dose  1,000 mL Route  IntraVENous Administered By  Alexandria Joya RN                  Critical Care:  I have spent 33 minutes of critical care time in evaluating and treating this patient. This includes time spent at bedside, time with family and decision makers, documentation, review of labs and imaging, and/or consultation with specialists. It does not include time spent on separately billed procedures. This patient presents with a critical illness or injury that acutely impairs one or more vital organ systems such that there is a high probability of imminent or life threatening deterioration in the patient's condition. This case involved decision making of high complexity to assess, manipulate, and support vital organ system failure and/or to prevent further life threatening deterioration of the patient's condition. Failure to initiate these interventions on an urgent basis would likely result in sudden, clinically significant or life threatening deterioration in the patient's condition. Abnormal findings supporting critical care: hypotension, concern for stroke, concern for compartment syndrome    Interventions to support critical care: IVF, discussion with teleneurology, discussion with orthopedics  Failure to intervene may result in: worsened end organ damage, worsened blood pressure, worsening of limb function      Disposition:  Home    DISCHARGE PLAN:  1. Discharge Medication List as of 2/24/2023  7:23 PM        2.    Follow-up Information       Follow up With Specialties Details Why Contact Mallory Rojas MD Family Medicine Schedule an appointment as soon as possible for a visit   [address] [place] [address]  [address] [address] [phone]      [facility] EMERGENCY DEPT Emergency Medicine  As needed, If symptoms worsen [address] [facility] [address]  [phone]          3. Return to ED if worse     Diagnosis     Clinical Impression:   1. Hypotension, unspecified hypotension type    2. Dehydration      Attestations:    Ness Jovel MD    Please note that this dictation was completed with itembase, the computer voice recognition software. Quite often unanticipated grammatical, syntax, homophones, and other interpretive errors are inadvertently transcribed by the computer software. Please disregard these errors. Please excuse any errors that have escaped final proofreading. Thank you.

## 2023-02-24 NOTE — CONSULTS
In ED  workup for possible stroke. Found with iv infiltrated. Approx 750ml ns      Not likely compartment syndrome with NS or LR . Recommend dc iv. No more iv to the effected arm. Strict elevation. Recheck in 3 hours  If any concern please call but likely   Will dc home without sequela.     Discussed with DR Ibeth Tony on call ortho  Agrees with above

## 2023-02-25 NOTE — PROGRESS NOTES
Examined arm 8pm  Swelling improved  Rom almost full  No signs of compartment syndrome      Dc home follow up PRN

## 2023-02-26 LAB
ATRIAL RATE: 75 BPM
CALCULATED P AXIS, ECG09: 39 DEGREES
CALCULATED R AXIS, ECG10: 61 DEGREES
CALCULATED T AXIS, ECG11: 46 DEGREES
DIAGNOSIS, 93000: NORMAL
P-R INTERVAL, ECG05: 154 MS
Q-T INTERVAL, ECG07: 456 MS
QRS DURATION, ECG06: 108 MS
QTC CALCULATION (BEZET), ECG08: 509 MS
VENTRICULAR RATE, ECG03: 75 BPM

## 2023-04-01 ENCOUNTER — HOSPITAL ENCOUNTER (EMERGENCY)
Age: 48
Discharge: HOME | End: 2023-04-01
Attending: STUDENT IN AN ORGANIZED HEALTH CARE EDUCATION/TRAINING PROGRAM
Payer: COMMERCIAL

## 2023-04-01 VITALS
TEMPERATURE: 98.6 F | RESPIRATION RATE: 20 BRPM | HEART RATE: 85 BPM | BODY MASS INDEX: 34.54 KG/M2 | WEIGHT: 277.78 LBS | DIASTOLIC BLOOD PRESSURE: 125 MMHG | SYSTOLIC BLOOD PRESSURE: 183 MMHG | OXYGEN SATURATION: 97 % | HEIGHT: 75 IN

## 2023-04-01 DIAGNOSIS — M10.9 ACUTE GOUT OF LEFT HAND, UNSPECIFIED CAUSE: Primary | ICD-10-CM

## 2023-04-01 PROCEDURE — 74011250637 HC RX REV CODE- 250/637: Performed by: STUDENT IN AN ORGANIZED HEALTH CARE EDUCATION/TRAINING PROGRAM

## 2023-04-01 PROCEDURE — 99283 EMERGENCY DEPT VISIT LOW MDM: CPT

## 2023-04-01 RX ORDER — COLCHICINE 0.6 MG/1
1.2 TABLET ORAL
Status: COMPLETED | OUTPATIENT
Start: 2023-04-01 | End: 2023-04-01

## 2023-04-01 RX ORDER — COLCHICINE 0.6 MG/1
0.6 TABLET ORAL DAILY
Qty: 30 TABLET | Refills: 0 | Status: SHIPPED | OUTPATIENT
Start: 2023-04-01

## 2023-04-01 RX ORDER — DOXYCYCLINE HYCLATE 100 MG
100 TABLET ORAL 2 TIMES DAILY
Qty: 14 TABLET | Refills: 0 | Status: SHIPPED | OUTPATIENT
Start: 2023-04-01 | End: 2023-04-08

## 2023-04-01 RX ADMIN — COLCHICINE 1.2 MG: 0.6 TABLET, FILM COATED ORAL at 10:29

## 2023-04-01 NOTE — Clinical Note
[address]  [facility] EMERGENCY DEPT  [address] [address]  [phone]    Work/School Note    Date: 4/1/2023    To Whom It May concern:    Tiburcio Taylor was seen and treated today in the emergency room by the following provider(s):  Attending Provider: Oscar Butler MD.      Tiburcio Taylor is excused from work/school on 04/01/23 and 04/02/23. He is medically clear to return to work/school on 4/3/2023.        Sincerely,          Lio Jiang MD

## 2023-04-01 NOTE — ED PROVIDER NOTES
[facility] EMERGENCY DEPT  EMERGENCY DEPARTMENT ENCOUNTER       Pt Name: Olga Carrillo  MRN: [id number]  [place] 1975  Date of evaluation: 4/1/2023  Provider: Evelia Martinez MD   PCP: Drake Cruz MD  Note Started: 10:20 AM 4/1/23     CHIEF COMPLAINT       Chief Complaint   Patient presents with    Finger Pain     Left index finger pain and swelling for 2 days. Patient reports worsening pain and swelling since last night. No known injury. HISTORY OF PRESENT ILLNESS: 1 or more elements    History From: Patient  HPI Limitations : None     Olga Carrillo is a 49 y.o. male with Pmhx listed below     Is a 54-year-old male with history of gout, hypertension presenting with concern of left hand pain. Patient states that he suffered no trauma to the area, noticed some mild pain about 3 days ago, stated that over the course of yesterday it worsened, noted some swelling and redness of the first metacarpal on the second digit, states that he has had no fevers, chills or nausea or vomiting, patient is able to move the digit but states that it is restricted secondary to pain. Patient has not taken anything for this, does have history of gout but never in the joint, no other complaints today. Patient is right-hand dominant      Nursing Notes were all reviewed and agreed with or any disagreements were addressed in the HPI. REVIEW OF SYSTEMS      Positives and Pertinent negatives as per HPI. PAST HISTORY     Past Medical History:  Past Medical History:   Diagnosis Date    Advance directive discussed with patient 10/22/2015    IBS (irritable bowel syndrome)     Knee pain, acute 10/02/14     prepatellar bursitis [place] 10/09/14    Ruptured tendon 2/2014    left distal biceps tendon (WC injury)     Previous Medications    ACETAMINOPHEN (TYLENOL) 325 MG TABLET    Take 3 Tablets by mouth every six (6) hours as needed for Pain. AMLODIPINE BESYLATE (AMLODIPINE PO)    Take 10 mg by mouth daily. AMPHETAMINE-DEXTROAMPHETAMINE XR (ADDERALL XR) 20 MG XR CAPSULE    Take 20 mg by mouth daily. CARVEDILOL (COREG) 25 MG TABLET    Take 25 mg by mouth two (2) times daily (with meals). CHLORTHALIDONE (HYGROTON) 25 MG TABLET    Take 50 mg by mouth daily. IBUPROFEN (MOTRIN) 600 MG TABLET    Take 1 Tablet by mouth every six (6) hours as needed for Pain. LISINOPRIL (PRINIVIL, ZESTRIL) 40 MG TABLET    Take 40 mg by mouth two (2) times a day. METHYLPREDNISOLONE (MEDROL, ADELINE,) 4 MG TABLET    Follow package instructions    TRAZODONE (DESYREL) 50 MG TABLET    Take 50 mg by mouth nightly. Past Surgical History:  Past Surgical History:   Procedure Laterality Date    HX ORTHOPAEDIC Left 1979    foot injury    [facility] EXTRAC ERUPTED TOOTH/EXPOSED ROOT  08/2017    Having teeth pulled and will have more dental work. Family History:  Family History   Problem Relation Age of Onset    Cancer Maternal Grandmother        Social History:  Social History     Tobacco Use    Smoking status: Never    Smokeless tobacco: Never   Substance Use Topics    Alcohol use: Not Currently     Comment: ; weekends    Drug use: No       Allergies:  No Known Allergies    PHYSICAL EXAM      ED Triage Vitals   ED Encounter Vitals Group      BP 04/01/23 1003 (!) 183/125      Pulse (Heart Rate) 04/01/23 1003 85      Resp Rate 04/01/23 1003 20      Temp 04/01/23 1003 98.6 °F (37 °C)      Temp src --       O2 Sat (%) 04/01/23 1003 97 %      Weight 04/01/23 1005 277 lb 12.5 oz      Height 04/01/23 1005 6' 3\"        Physical Exam  Vitals reviewed. Constitutional:       General: He is not in acute distress. Appearance: Normal appearance. He is not ill-appearing, toxic-appearing or diaphoretic. HENT:      Head: Normocephalic. Mouth/Throat:      Mouth: Mucous membranes are moist.   Eyes:      Conjunctiva/sclera: Conjunctivae normal.   Cardiovascular:      Rate and Rhythm: Normal rate.    Pulmonary:      Effort: Pulmonary effort is normal. No respiratory distress. Abdominal:      General: Abdomen is flat. Musculoskeletal:         General: No deformity. Cervical back: Neck supple. Comments: Patient with mild redness over second metacarpal, mild swelling in the digit, no significant flexor tenderness, digit is not held in flexion, able to range the digit without significant difficulty but does complain of some pain with this. Tenderness palpation is significant over first metacarpal joint   Skin:     General: Skin is warm and dry. Neurological:      General: No focal deficit present. Mental Status: He is alert. Psychiatric:         Mood and Affect: Mood normal.        EMERGENCY DEPARTMENT COURSE and DIFFERENTIAL DIAGNOSIS/MDM   CC/HPI Summary, DDx, ED Course, and Reassessment:     MDM  Number of Diagnoses or Management Options  Acute gout of left hand, unspecified cause  Diagnosis management comments: Patient is a 41-year-old male with history of gout, hypertension presenting with concern of pain in the second metacarpal joint on the left hand, states it began about 2 to 3 days ago, worsening, noticed some redness and swelling that started today, denies any constitutional symptoms, patient overall well-appearing on arrival, low suspicion of flexor tenosynovitis or septic joint, do not feel that imaging is indicated today without any history of trauma, high suspicion of gout with history of this, will provide patient with symptomatic treatment and given redness and warmth would consider coverage with antibiotics although higher suspicion for gout, notified patient of this and instructed him that if symptoms continue over the next 2 days or worsen to follow-up with hand surgery, patient expressed understanding and will do so.     Notify patient of elevated blood pressure today, states that he has been taking his medication as directed, takes it intermittently throughout the day, states that he frequently has significant changes in blood pressure and is in discussion with his cardiologist about this, instructed him to continue to monitor and follow-up with them and he expressed understanding has any symptoms from this. Vitals:    04/01/23 1003 04/01/23 1005   BP: (!) 183/125    Pulse: 85    Resp: 20    Temp: 98.6 °F (37 °C)    SpO2: 97%    Weight:  126 kg (277 lb 12.5 oz)   Height:  6' 3\" (1.905 m)        Patient was given the following medications:  Medications   colchicine tablet 1.2 mg (1.2 mg Oral Given 4/1/23 1029)       CONSULTS:  None    Social Determinants affecting Dx or Tx: None    Records Reviewed (source and summary of external notes): None and Prior medical records  DIAGNOSTIC RESULTS   LABS:     No results found for this or any previous visit (from the past 12 hour(s)). EKG interpreted by me:     RADIOLOGY:  Non-plain film images such as CT, Ultrasound and MRI are read by the radiologist.   Plain radiographic images are often visualized and preliminarily interpreted by the ED, if an interpretation was completed the findings will be listed below:        Interpretation per the Radiologist below, if available at the time of this note:     No results found. PROCEDURES   Unless otherwise noted below, none  Procedures     CRITICAL CARE TIME       FINAL IMPRESSION     1. Acute gout of left hand, unspecified cause          DISPOSITION/PLAN   Discharged    Discharge Note: The patient is stable for discharge home. The signs, symptoms, diagnosis, and discharge instructions have been discussed, understanding conveyed, and agreed upon. The patient is to follow up as recommended or return to ER should their symptoms worsen.       PATIENT REFERRED TO:  Follow-up Information       Follow up With Specialties Details Why Contact Info    Boston Sheehan MD [address].  [phone]      [facility] EMERGENCY DEPT Emergency Medicine   [address] [place] [address] [facility], [address], MD Hand Surgery Physician   [address]  [address] [address]  [address] [id number] [phone]                DISCHARGE MEDICATIONS:  Current Discharge Medication List        START taking these medications    Details   doxycycline (VIBRA-TABS) 100 mg tablet Take 1 Tablet by mouth two (2) times a day for 7 days. Qty: 14 Tablet, Refills: 0  Start date: 4/1/2023, End date: 4/8/2023      colchicine 0.6 mg tablet Take 1 Tablet by mouth daily. Take daily until symptoms resolved  Qty: 30 Tablet, Refills: 0  Start date: 4/1/2023               DISCONTINUED MEDICATIONS:  Current Discharge Medication List          I am the Primary Clinician of Record. Signed By: Christiana Diamond MD     April 1, 2023      (Please note that parts of this dictation were completed with voice recognition software. Quite often unanticipated grammatical, syntax, homophones, and other interpretive errors are inadvertently transcribed by the computer software. Please disregards these errors.  Please excuse any errors that have escaped final proofreading.)

## 2023-06-07 ENCOUNTER — HOSPITAL ENCOUNTER (OUTPATIENT)
Facility: HOSPITAL | Age: 48
Discharge: HOME | End: 2023-06-10
Payer: COMMERCIAL

## 2023-06-07 DIAGNOSIS — R94.4 NONSPECIFIC ABNORMAL RESULTS OF KIDNEY FUNCTION STUDY: ICD-10-CM

## 2023-06-07 PROCEDURE — 76770 US EXAM ABDO BACK WALL COMP: CPT

## 2023-10-02 ENCOUNTER — APPOINTMENT (OUTPATIENT)
Facility: HOSPITAL | Age: 48
End: 2023-10-02
Payer: COMMERCIAL

## 2023-10-02 ENCOUNTER — HOSPITAL ENCOUNTER (EMERGENCY)
Facility: HOSPITAL | Age: 48
Discharge: HOME | End: 2023-10-02
Payer: COMMERCIAL

## 2023-10-02 VITALS
TEMPERATURE: 98.6 F | RESPIRATION RATE: 18 BRPM | OXYGEN SATURATION: 98 % | DIASTOLIC BLOOD PRESSURE: 100 MMHG | BODY MASS INDEX: 33.76 KG/M2 | HEART RATE: 85 BPM | WEIGHT: 270.06 LBS | SYSTOLIC BLOOD PRESSURE: 185 MMHG

## 2023-10-02 DIAGNOSIS — R11.2 NAUSEA AND VOMITING, UNSPECIFIED VOMITING TYPE: Primary | ICD-10-CM

## 2023-10-02 DIAGNOSIS — I10 ESSENTIAL HYPERTENSION: ICD-10-CM

## 2023-10-02 DIAGNOSIS — M25.511 ACUTE PAIN OF RIGHT SHOULDER: ICD-10-CM

## 2023-10-02 LAB
ALBUMIN SERPL-MCNC: 3.5 G/DL (ref 3.5–5)
ALBUMIN/GLOB SERPL: 0.8 (ref 1.1–2.2)
ALP SERPL-CCNC: 91 U/L (ref 45–117)
ALT SERPL-CCNC: 38 U/L (ref 12–78)
ANION GAP SERPL CALC-SCNC: 5 MMOL/L (ref 5–15)
AST SERPL-CCNC: 23 U/L (ref 15–37)
BASOPHILS # BLD: 0 K/UL (ref 0–0.1)
BASOPHILS NFR BLD: 0 % (ref 0–1)
BILIRUB SERPL-MCNC: 1.3 MG/DL (ref 0.2–1)
BUN SERPL-MCNC: 10 MG/DL (ref 6–20)
BUN/CREAT SERPL: 9 (ref 12–20)
CALCIUM SERPL-MCNC: 9.4 MG/DL (ref 8.5–10.1)
CHLORIDE SERPL-SCNC: 100 MMOL/L (ref 97–108)
CO2 SERPL-SCNC: 29 MMOL/L (ref 21–32)
CREAT SERPL-MCNC: 1.13 MG/DL (ref 0.7–1.3)
DIFFERENTIAL METHOD BLD: ABNORMAL
EOSINOPHIL # BLD: 0.1 K/UL (ref 0–0.4)
EOSINOPHIL NFR BLD: 1 % (ref 0–7)
ERYTHROCYTE [DISTWIDTH] IN BLOOD BY AUTOMATED COUNT: 13.1 % (ref 11.5–14.5)
GLOBULIN SER CALC-MCNC: 4.4 G/DL (ref 2–4)
GLUCOSE SERPL-MCNC: 148 MG/DL (ref 65–100)
HCT VFR BLD AUTO: 40.5 % (ref 36.6–50.3)
HGB BLD-MCNC: 14 G/DL (ref 12.1–17)
IMM GRANULOCYTES # BLD AUTO: 0.1 K/UL (ref 0–0.04)
IMM GRANULOCYTES NFR BLD AUTO: 0 % (ref 0–0.5)
LYMPHOCYTES # BLD: 1.6 K/UL (ref 0.8–3.5)
LYMPHOCYTES NFR BLD: 13 % (ref 12–49)
MCH RBC QN AUTO: 28.6 PG (ref 26–34)
MCHC RBC AUTO-ENTMCNC: 34.6 G/DL (ref 30–36.5)
MCV RBC AUTO: 82.7 FL (ref 80–99)
MONOCYTES # BLD: 0.8 K/UL (ref 0–1)
MONOCYTES NFR BLD: 7 % (ref 5–13)
NEUTS SEG # BLD: 9.7 K/UL (ref 1.8–8)
NEUTS SEG NFR BLD: 79 % (ref 32–75)
NRBC # BLD: 0 K/UL (ref 0–0.01)
NRBC BLD-RTO: 0 PER 100 WBC
PLATELET # BLD AUTO: 299 K/UL (ref 150–400)
PMV BLD AUTO: 9.1 FL (ref 8.9–12.9)
POTASSIUM SERPL-SCNC: 3.6 MMOL/L (ref 3.5–5.1)
PROT SERPL-MCNC: 7.9 G/DL (ref 6.4–8.2)
RBC # BLD AUTO: 4.9 M/UL (ref 4.1–5.7)
SODIUM SERPL-SCNC: 134 MMOL/L (ref 136–145)
TROPONIN I SERPL HS-MCNC: 11 NG/L (ref 0–76)
TROPONIN I SERPL HS-MCNC: 13 NG/L (ref 0–76)
WBC # BLD AUTO: 12.3 K/UL (ref 4.1–11.1)

## 2023-10-02 PROCEDURE — 71045 X-RAY EXAM CHEST 1 VIEW: CPT

## 2023-10-02 PROCEDURE — 96360 HYDRATION IV INFUSION INIT: CPT

## 2023-10-02 PROCEDURE — 93005 ELECTROCARDIOGRAM TRACING: CPT | Performed by: PHYSICIAN ASSISTANT

## 2023-10-02 PROCEDURE — 85025 COMPLETE CBC W/AUTO DIFF WBC: CPT

## 2023-10-02 PROCEDURE — 36415 COLL VENOUS BLD VENIPUNCTURE: CPT

## 2023-10-02 PROCEDURE — 80053 COMPREHEN METABOLIC PANEL: CPT

## 2023-10-02 PROCEDURE — 84484 ASSAY OF TROPONIN QUANT: CPT

## 2023-10-02 PROCEDURE — 2580000003 HC RX 258: Performed by: PHYSICIAN ASSISTANT

## 2023-10-02 PROCEDURE — 99285 EMERGENCY DEPT VISIT HI MDM: CPT

## 2023-10-02 RX ORDER — ONDANSETRON 4 MG/1
4 TABLET, ORALLY DISINTEGRATING ORAL 3 TIMES DAILY PRN
Qty: 21 TABLET | Refills: 0 | Status: SHIPPED | OUTPATIENT
Start: 2023-10-02

## 2023-10-02 RX ORDER — 0.9 % SODIUM CHLORIDE 0.9 %
500 INTRAVENOUS SOLUTION INTRAVENOUS ONCE
Status: COMPLETED | OUTPATIENT
Start: 2023-10-02 | End: 2023-10-02

## 2023-10-02 RX ORDER — CYCLOBENZAPRINE HCL 10 MG
10 TABLET ORAL 3 TIMES DAILY PRN
Qty: 21 TABLET | Refills: 0 | Status: SHIPPED | OUTPATIENT
Start: 2023-10-02 | End: 2023-10-12

## 2023-10-02 RX ORDER — ACETAMINOPHEN 325 MG/1
650 TABLET ORAL EVERY 6 HOURS PRN
Qty: 20 TABLET | Refills: 0 | Status: SHIPPED | OUTPATIENT
Start: 2023-10-02

## 2023-10-02 RX ADMIN — SODIUM CHLORIDE 500 ML: 900 INJECTION, SOLUTION INTRAVENOUS at 13:10

## 2023-10-02 ASSESSMENT — ENCOUNTER SYMPTOMS
VOMITING: 1
NAUSEA: 1
ABDOMINAL PAIN: 0
DIARRHEA: 0
SHORTNESS OF BREATH: 0
CHEST TIGHTNESS: 1

## 2023-10-02 ASSESSMENT — PAIN SCALES - GENERAL: PAINLEVEL_OUTOF10: 6

## 2023-10-02 NOTE — ED PROVIDER NOTES
[facility] EMERGENCY DEPT  EMERGENCY DEPARTMENT ENCOUNTER       Pt Name: Lester Madsen  MRN: [id number]  [address] 1975  Date of evaluation: 10/2/2023  Provider: JAYDON Pak   PCP: Mayi Patel MD  Note Started: 10:01 AM EDT 10/2/23     CHIEF COMPLAINT       Chief Complaint   Patient presents with    Emesis     Vomiting and diarrhea onset this past Friday. Bp very high over the weekend but it did come back down some. Last vomited last night-if he turns his head to left, his right shoulder is hurting. No known shoulder injury. Diarrhea        HISTORY OF PRESENT ILLNESS: 1 or more elements      History From: Patient  HPI Limitations: None     Lester Madsen is a 49 y.o. male who presents ambulatory with several days of multiple episodes of nonbilious, nonbloody vomiting. He tells me he has had no fever or diarrhea. He tells me Friday when he got home from work he did experience multiple episodes of vomiting for which she was back and forth to the bathroom. He tells me he never had diarrhea. He tells me after vomiting, he did develop this pain in the muscles of the right shoulder. He did experience some \"tightness\" in his chest but denies any chest pain or shortness of breath. He tells me he takes multiple blood pressure medications and did take his blood pressure medicine this morning. He tells me he noticed his blood pressure was high over the weekend. He tells me he called his cardiologist this morning and was encouraged to come to the emergency room for evaluation. Nursing Notes were all reviewed and agreed with or any disagreements were addressed in the HPI. REVIEW OF SYSTEMS      Review of Systems   Respiratory:  Positive for chest tightness. Negative for shortness of breath. Cardiovascular:  Negative for chest pain. Gastrointestinal:  Positive for nausea and vomiting. Negative for abdominal pain and diarrhea.    Musculoskeletal:         Right shoulder pain        Positives and Pertinent

## 2023-10-02 NOTE — ED NOTES
Provider OK with elevated BP at discharge. Patient is stable and in no acute distress.      Anita Kc RN  10/02/23 [id number]

## 2023-10-03 LAB
EKG ATRIAL RATE: 83 BPM
EKG DIAGNOSIS: NORMAL
EKG P AXIS: 18 DEGREES
EKG P-R INTERVAL: 146 MS
EKG Q-T INTERVAL: 404 MS
EKG QRS DURATION: 108 MS
EKG QTC CALCULATION (BAZETT): 474 MS
EKG R AXIS: 34 DEGREES
EKG T AXIS: 12 DEGREES
EKG VENTRICULAR RATE: 83 BPM

## 2023-10-04 ENCOUNTER — APPOINTMENT (OUTPATIENT)
Facility: HOSPITAL | Age: 48
End: 2023-10-04
Payer: COMMERCIAL

## 2023-10-04 ENCOUNTER — HOSPITAL ENCOUNTER (EMERGENCY)
Facility: HOSPITAL | Age: 48
Discharge: HOME | End: 2023-10-04
Attending: STUDENT IN AN ORGANIZED HEALTH CARE EDUCATION/TRAINING PROGRAM
Payer: COMMERCIAL

## 2023-10-04 VITALS
DIASTOLIC BLOOD PRESSURE: 66 MMHG | TEMPERATURE: 98.4 F | SYSTOLIC BLOOD PRESSURE: 116 MMHG | WEIGHT: 269.62 LBS | OXYGEN SATURATION: 95 % | RESPIRATION RATE: 18 BRPM | BODY MASS INDEX: 33.7 KG/M2 | HEART RATE: 84 BPM

## 2023-10-04 DIAGNOSIS — M25.561 ACUTE PAIN OF RIGHT KNEE: ICD-10-CM

## 2023-10-04 DIAGNOSIS — I10 ESSENTIAL HYPERTENSION: Primary | ICD-10-CM

## 2023-10-04 DIAGNOSIS — M79.672 LEFT FOOT PAIN: ICD-10-CM

## 2023-10-04 LAB
ALBUMIN SERPL-MCNC: 3.2 G/DL (ref 3.5–5)
ALBUMIN/GLOB SERPL: 0.7 (ref 1.1–2.2)
ALP SERPL-CCNC: 88 U/L (ref 45–117)
ALT SERPL-CCNC: 47 U/L (ref 12–78)
ANION GAP SERPL CALC-SCNC: 4 MMOL/L (ref 5–15)
APPEARANCE UR: CLEAR
AST SERPL-CCNC: 29 U/L (ref 15–37)
BACTERIA URNS QL MICRO: NEGATIVE /HPF
BASOPHILS # BLD: 0 K/UL (ref 0–0.1)
BASOPHILS NFR BLD: 0 % (ref 0–1)
BILIRUB SERPL-MCNC: 0.6 MG/DL (ref 0.2–1)
BILIRUB UR QL: NEGATIVE
BUN SERPL-MCNC: 10 MG/DL (ref 6–20)
BUN/CREAT SERPL: 11 (ref 12–20)
CALCIUM SERPL-MCNC: 8.6 MG/DL (ref 8.5–10.1)
CHLORIDE SERPL-SCNC: 103 MMOL/L (ref 97–108)
CO2 SERPL-SCNC: 29 MMOL/L (ref 21–32)
COLOR UR: NORMAL
CREAT SERPL-MCNC: 0.88 MG/DL (ref 0.7–1.3)
DIFFERENTIAL METHOD BLD: ABNORMAL
EKG ATRIAL RATE: 81 BPM
EKG DIAGNOSIS: NORMAL
EKG P AXIS: 19 DEGREES
EKG P-R INTERVAL: 140 MS
EKG Q-T INTERVAL: 396 MS
EKG QRS DURATION: 104 MS
EKG QTC CALCULATION (BAZETT): 460 MS
EKG R AXIS: 25 DEGREES
EKG T AXIS: 16 DEGREES
EKG VENTRICULAR RATE: 81 BPM
EOSINOPHIL # BLD: 0.2 K/UL (ref 0–0.4)
EOSINOPHIL NFR BLD: 2 % (ref 0–7)
EPITH CASTS URNS QL MICRO: NORMAL /LPF
ERYTHROCYTE [DISTWIDTH] IN BLOOD BY AUTOMATED COUNT: 13.3 % (ref 11.5–14.5)
GLOBULIN SER CALC-MCNC: 4.5 G/DL (ref 2–4)
GLUCOSE SERPL-MCNC: 107 MG/DL (ref 65–100)
GLUCOSE UR STRIP.AUTO-MCNC: NEGATIVE MG/DL
HCT VFR BLD AUTO: 38.2 % (ref 36.6–50.3)
HGB BLD-MCNC: 12.9 G/DL (ref 12.1–17)
HGB UR QL STRIP: NEGATIVE
HYALINE CASTS URNS QL MICRO: NORMAL /LPF (ref 0–2)
IMM GRANULOCYTES # BLD AUTO: 0 K/UL (ref 0–0.04)
IMM GRANULOCYTES NFR BLD AUTO: 0 % (ref 0–0.5)
KETONES UR QL STRIP.AUTO: NEGATIVE MG/DL
LEUKOCYTE ESTERASE UR QL STRIP.AUTO: NEGATIVE
LYMPHOCYTES # BLD: 2.1 K/UL (ref 0.8–3.5)
LYMPHOCYTES NFR BLD: 18 % (ref 12–49)
MCH RBC QN AUTO: 28.1 PG (ref 26–34)
MCHC RBC AUTO-ENTMCNC: 33.8 G/DL (ref 30–36.5)
MCV RBC AUTO: 83.2 FL (ref 80–99)
MONOCYTES # BLD: 0.8 K/UL (ref 0–1)
MONOCYTES NFR BLD: 7 % (ref 5–13)
NEUTS SEG # BLD: 8.6 K/UL (ref 1.8–8)
NEUTS SEG NFR BLD: 73 % (ref 32–75)
NITRITE UR QL STRIP.AUTO: NEGATIVE
NRBC # BLD: 0 K/UL (ref 0–0.01)
NRBC BLD-RTO: 0 PER 100 WBC
PH UR STRIP: 6 (ref 5–8)
PLATELET # BLD AUTO: 356 K/UL (ref 150–400)
PMV BLD AUTO: 9.2 FL (ref 8.9–12.9)
POTASSIUM SERPL-SCNC: 3.7 MMOL/L (ref 3.5–5.1)
PROT SERPL-MCNC: 7.7 G/DL (ref 6.4–8.2)
PROT UR STRIP-MCNC: NEGATIVE MG/DL
RBC # BLD AUTO: 4.59 M/UL (ref 4.1–5.7)
RBC #/AREA URNS HPF: NORMAL /HPF (ref 0–5)
SODIUM SERPL-SCNC: 136 MMOL/L (ref 136–145)
SP GR UR REFRACTOMETRY: 1.02
URINE CULTURE IF INDICATED: NORMAL
UROBILINOGEN UR QL STRIP.AUTO: 0.2 EU/DL (ref 0.2–1)
WBC # BLD AUTO: 11.8 K/UL (ref 4.1–11.1)
WBC URNS QL MICRO: NORMAL /HPF (ref 0–4)

## 2023-10-04 PROCEDURE — 36415 COLL VENOUS BLD VENIPUNCTURE: CPT

## 2023-10-04 PROCEDURE — 81001 URINALYSIS AUTO W/SCOPE: CPT

## 2023-10-04 PROCEDURE — 99285 EMERGENCY DEPT VISIT HI MDM: CPT

## 2023-10-04 PROCEDURE — 6360000002 HC RX W HCPCS

## 2023-10-04 PROCEDURE — 93005 ELECTROCARDIOGRAM TRACING: CPT

## 2023-10-04 PROCEDURE — 80053 COMPREHEN METABOLIC PANEL: CPT

## 2023-10-04 PROCEDURE — 85025 COMPLETE CBC W/AUTO DIFF WBC: CPT

## 2023-10-04 PROCEDURE — 6370000000 HC RX 637 (ALT 250 FOR IP)

## 2023-10-04 PROCEDURE — 73562 X-RAY EXAM OF KNEE 3: CPT

## 2023-10-04 PROCEDURE — 70450 CT HEAD/BRAIN W/O DYE: CPT

## 2023-10-04 PROCEDURE — 96374 THER/PROPH/DIAG INJ IV PUSH: CPT

## 2023-10-04 RX ORDER — HYDRALAZINE HYDROCHLORIDE 50 MG/1
50 TABLET, FILM COATED ORAL
Status: COMPLETED | OUTPATIENT
Start: 2023-10-04 | End: 2023-10-04

## 2023-10-04 RX ORDER — CLONIDINE HYDROCHLORIDE 0.1 MG/1
0.2 TABLET ORAL
Status: COMPLETED | OUTPATIENT
Start: 2023-10-04 | End: 2023-10-04

## 2023-10-04 RX ORDER — KETOROLAC TROMETHAMINE 30 MG/ML
30 INJECTION, SOLUTION INTRAMUSCULAR; INTRAVENOUS
Status: COMPLETED | OUTPATIENT
Start: 2023-10-04 | End: 2023-10-04

## 2023-10-04 RX ORDER — NAPROXEN 500 MG/1
500 TABLET ORAL 2 TIMES DAILY WITH MEALS
Qty: 20 TABLET | Refills: 0 | Status: SHIPPED | OUTPATIENT
Start: 2023-10-04 | End: 2023-10-14

## 2023-10-04 RX ADMIN — KETOROLAC TROMETHAMINE 30 MG: 30 INJECTION, SOLUTION INTRAMUSCULAR; INTRAVENOUS at 14:35

## 2023-10-04 RX ADMIN — HYDRALAZINE HYDROCHLORIDE 50 MG: 50 TABLET, FILM COATED ORAL at 14:35

## 2023-10-04 RX ADMIN — CLONIDINE HYDROCHLORIDE 0.2 MG: 0.1 TABLET ORAL at 14:35

## 2023-10-04 ASSESSMENT — PAIN SCALES - GENERAL: PAINLEVEL_OUTOF10: 0

## 2023-10-04 NOTE — ED PROVIDER NOTES
[facility] EMERGENCY DEPT  EMERGENCY DEPARTMENT ENCOUNTER       Pt Name: Charli Pugh  MRN: [id number]  [address] [facility] [place] 1975  Date of evaluation: 10/4/2023  Provider: Fabienne Robledo PA-C   PCP: Edinson Terrazas MD  Note Started: 1:35 PM EDT 10/4/23     CHIEF COMPLAINT       Chief Complaint   Patient presents with    Hypertension     210/109 at home, pt checking repeatedly at home, remains elevated, came in to ED 2 days ago for same thing        HISTORY OF PRESENT ILLNESS: 1 or more elements      History From: Patient  HPI Limitations: None     Charli Pugh is a 49 y.o. male who presents c/o elevated blood pressure. Patient reports his blood pressures have been elevated the last few days, greater than 966 systolic greater than 041 diastolic. He states he called his cardiologist office on Monday and hydralazine was added to his blood pressure regimen. He is now taking hydralazine 100 mg 1/2 TID, spironlactone 25 BID, amlodipine 10 mg in AM, clonidine 0.2 mg TID, chlothalidine 50mg 1 daily, carvedilol 25 BID. He reports he has not had his midday dose of clonidine or hydralazine today yet. He reports she was seen here on 10/2/2023 for hypertension as well as right shoulder pain, nausea and vomiting. He reports the shoulder pain has improved, and he has not had nausea or vomiting since Sunday. He states he has had extensive work-up to evaluate for secondary causes of blood pressure, and states that has been found. He is also with various orthopedic complaints. He complains of lateral plantar left foot pain and right knee pain. He reports history of gout for which he takes tart cherry juice. He has not had any steroids recently. He also complains of increased feelings of fatigue. He reports he has CPAP machine but has not been wearing this recently. He states he has been getting 8 to 10 hours of sleep. He complains of some feeling blurry vision.   He denies any fever, chills, nausea, vomiting, abdominal pain,

## 2023-10-04 NOTE — ED NOTES
Pt discharged by Bebe Salgado. Discharge instructions discussed and pt given opportunity to ask questions.  Pt ambulatory out of ED        Cesar aTy  63/69/06 [id number]

## 2024-01-01 ENCOUNTER — HOSPITAL ENCOUNTER (EMERGENCY)
Facility: HOSPITAL | Age: 49
Discharge: HOME | End: 2024-01-01
Payer: COMMERCIAL

## 2024-01-01 ENCOUNTER — APPOINTMENT (OUTPATIENT)
Facility: HOSPITAL | Age: 49
End: 2024-01-01
Payer: COMMERCIAL

## 2024-01-01 VITALS
HEART RATE: 84 BPM | DIASTOLIC BLOOD PRESSURE: 104 MMHG | RESPIRATION RATE: 16 BRPM | SYSTOLIC BLOOD PRESSURE: 168 MMHG | OXYGEN SATURATION: 98 % | BODY MASS INDEX: 34.44 KG/M2 | TEMPERATURE: 98.1 F | WEIGHT: 275.57 LBS

## 2024-01-01 DIAGNOSIS — R03.0 ELEVATED BLOOD PRESSURE READING: ICD-10-CM

## 2024-01-01 DIAGNOSIS — T14.8XXA MUSCULOSKELETAL STRAIN: ICD-10-CM

## 2024-01-01 DIAGNOSIS — M25.511 ACUTE PAIN OF RIGHT SHOULDER: Primary | ICD-10-CM

## 2024-01-01 LAB
EKG ATRIAL RATE: 75 BPM
EKG DIAGNOSIS: NORMAL
EKG P AXIS: 33 DEGREES
EKG P-R INTERVAL: 140 MS
EKG Q-T INTERVAL: 404 MS
EKG QRS DURATION: 106 MS
EKG QTC CALCULATION (BAZETT): 451 MS
EKG R AXIS: 37 DEGREES
EKG T AXIS: 36 DEGREES
EKG VENTRICULAR RATE: 75 BPM

## 2024-01-01 PROCEDURE — 99284 EMERGENCY DEPT VISIT MOD MDM: CPT

## 2024-01-01 PROCEDURE — 96372 THER/PROPH/DIAG INJ SC/IM: CPT

## 2024-01-01 PROCEDURE — 6360000002 HC RX W HCPCS: Performed by: PHYSICIAN ASSISTANT

## 2024-01-01 PROCEDURE — 73030 X-RAY EXAM OF SHOULDER: CPT

## 2024-01-01 PROCEDURE — 93005 ELECTROCARDIOGRAM TRACING: CPT | Performed by: PHYSICIAN ASSISTANT

## 2024-01-01 RX ORDER — DEXAMETHASONE SODIUM PHOSPHATE 10 MG/ML
4 INJECTION, SOLUTION INTRAMUSCULAR; INTRAVENOUS ONCE
Status: COMPLETED | OUTPATIENT
Start: 2024-01-01 | End: 2024-01-01

## 2024-01-01 RX ORDER — METHOCARBAMOL 750 MG/1
750 TABLET, FILM COATED ORAL 4 TIMES DAILY
Qty: 40 TABLET | Refills: 0 | Status: SHIPPED | OUTPATIENT
Start: 2024-01-01 | End: 2024-01-11

## 2024-01-01 RX ORDER — KETOROLAC TROMETHAMINE 30 MG/ML
15 INJECTION, SOLUTION INTRAMUSCULAR; INTRAVENOUS
Status: COMPLETED | OUTPATIENT
Start: 2024-01-01 | End: 2024-01-01

## 2024-01-01 RX ADMIN — KETOROLAC TROMETHAMINE 15 MG: 30 INJECTION, SOLUTION INTRAMUSCULAR; INTRAVENOUS at 08:41

## 2024-01-01 RX ADMIN — DEXAMETHASONE SODIUM PHOSPHATE 4 MG: 10 INJECTION INTRAMUSCULAR; INTRAVENOUS at 08:41

## 2024-01-01 ASSESSMENT — PAIN SCALES - GENERAL: PAINLEVEL_OUTOF10: 8

## 2024-01-01 NOTE — ED PROVIDER NOTES
\[facility]\"" EMERGENCY DEPT  EMERGENCY DEPARTMENT ENCOUNTER       Pt Name: Dylon Ratliff  MRN: [id number]  Birthdate 1975  Date of evaluation: 1/1/2024  Provider: JAYDON Villagomez   PCP: Arun Villarreal MD  Note Started: 7:35 AM EST 1/1/24     CHIEF COMPLAINT       Chief Complaint   Patient presents with    Shoulder Pain     Sunday a week ago, working he began with right shoulder pain. Area of pain is tightness in posterior right shoulder. No known injury to right shoulder. He can raise the right arm , but cannot hold it there        HISTORY OF PRESENT ILLNESS: 1 or more elements      History From: Patient  None     Dylon Ratliff is a 48 y.o. male who presents to the ED for evaluation of mild, intermittent right shoulder pain the past several days. States the pain is an intermittent pain to posterior shoulder associated with movement, especially reaching overhead. States symptoms started while he was working, but denies any specific trauma or injuries.  Denies numbness, weakness or tingling. Denies swelling, redness or warmth. Denies fevers, chest pain, SOB, abdominal pain.      Nursing Notes were all reviewed and agreed with or any disagreements were addressed in the HPI.     REVIEW OF SYSTEMS      Review of Systems   All other systems reviewed and are negative.       Positives and Pertinent negatives as per HPI.    PAST HISTORY     Past Medical History:  Past Medical History:   Diagnosis Date    Advance directive discussed with patient 10/22/2015    IBS (irritable bowel syndrome)     Knee pain, acute 10/02/14     prepatellar bursitis [facility] [place] 10/09/14    Ruptured tendon 2/2014    left distal biceps tendon (WC injury)       Past Surgical History:  Past Surgical History:   Procedure Laterality Date    EXTRAC ERUPTED TOOTH/EXPOSED ROOT  08/2017    Having teeth pulled and will have more dental work.    ORTHOPEDIC SURGERY Left 1979    foot injury       Family History:  Family History   Problem Relation Age of

## 2024-01-01 NOTE — ED NOTES
I have reviewed the provider's instructions with the patient, answering all questions to his satisfaction. Pt ambulatory to waiting room

## 2024-01-03 ENCOUNTER — TELEPHONE (OUTPATIENT)
Age: 49
End: 2024-01-03

## 2024-01-03 NOTE — TELEPHONE ENCOUNTER
Reached out to the patient to schedule an ED F/U apt however he declined stating he was not ready to schedule at this time. The patient was asked to call back when he was ready to schedule.

## 2024-01-05 ENCOUNTER — OFFICE VISIT (OUTPATIENT)
Age: 49
End: 2024-01-05
Payer: COMMERCIAL

## 2024-01-05 VITALS
SYSTOLIC BLOOD PRESSURE: 184 MMHG | HEIGHT: 75 IN | BODY MASS INDEX: 34.82 KG/M2 | WEIGHT: 280 LBS | HEART RATE: 86 BPM | OXYGEN SATURATION: 98 % | RESPIRATION RATE: 18 BRPM | TEMPERATURE: 97.1 F | DIASTOLIC BLOOD PRESSURE: 126 MMHG

## 2024-01-05 DIAGNOSIS — S46.911A STRAIN OF RIGHT SHOULDER, INITIAL ENCOUNTER: Primary | ICD-10-CM

## 2024-01-05 PROCEDURE — 99204 OFFICE O/P NEW MOD 45 MIN: CPT | Performed by: ORTHOPAEDIC SURGERY

## 2024-01-05 PROCEDURE — 3080F DIAST BP >= 90 MM HG: CPT | Performed by: ORTHOPAEDIC SURGERY

## 2024-01-05 PROCEDURE — 3077F SYST BP >= 140 MM HG: CPT | Performed by: ORTHOPAEDIC SURGERY

## 2024-01-05 RX ORDER — DICLOFENAC SODIUM 75 MG/1
75 TABLET, DELAYED RELEASE ORAL 2 TIMES DAILY
Qty: 60 TABLET | Refills: 0 | Status: SHIPPED | OUTPATIENT
Start: 2024-01-05 | End: 2024-02-04

## 2024-01-05 ASSESSMENT — PATIENT HEALTH QUESTIONNAIRE - PHQ9
2. FEELING DOWN, DEPRESSED OR HOPELESS: 0
SUM OF ALL RESPONSES TO PHQ QUESTIONS 1-9: 0
1. LITTLE INTEREST OR PLEASURE IN DOING THINGS: 0
SUM OF ALL RESPONSES TO PHQ QUESTIONS 1-9: 0
SUM OF ALL RESPONSES TO PHQ9 QUESTIONS 1 & 2: 0

## 2024-01-05 NOTE — PROGRESS NOTES
Room:   I have reviewed all needed documentation in preparation for visit. Verified patient by name and date of birth  Chief Complaint   Patient presents with    New Patient    Shoulder Pain     Right        Vitals:    01/05/24 1307   BP: (!) 184/126   Site: Left Upper Arm   Position: Sitting   Cuff Size: Large Adult   Pulse: 86   Resp: 18   Temp: 97.1 °F (36.2 °C)   TempSrc: Temporal   SpO2: 98%   Weight: 127 kg (280 lb)   Height: 1.905 m (6' 3\")     Denies chest pain, shortness of breath, nausea, vomiting, diarrhea, and cardiac symptoms. Provider aware.      Pain Score:   7 (RT shoulder)    Health Maintenance Review: Patient reminded of \"due or due soon\" health maintenance. I have asked the patient to contact his/her primary care provider (PCP) for follow-up on his/her health maintenance.    \"Have you been to the ER, urgent care clinic since your last visit?  Hospitalized since your last visit?\"    NO    “Have you seen or consulted any other health care providers outside of [facility] since your last visit?”    NO      
diclofenac sodium (VOLTAREN) 1 % GEL, Apply 1 g topically 4 times daily (Patient not taking: Reported on 1/5/2024), Disp: 150 g, Rfl: 0    naproxen (NAPROSYN) 500 MG tablet, Take 1 tablet by mouth 2 times daily (with meals) for 10 days, Disp: 20 tablet, Rfl: 0    amphetamine-dextroamphetamine (ADDERALL XR) 20 MG extended release capsule, Take by mouth daily. (Patient not taking: Reported on 1/5/2024), Disp: , Rfl:     methylPREDNISolone (MEDROL DOSEPACK) 4 MG tablet, Follow package instructions (Patient not taking: Reported on 1/5/2024), Disp: , Rfl:     traZODone (DESYREL) 50 MG tablet, Take by mouth, Disp: , Rfl:     All:  No Known Allergies    Social Hx:  Social History     Socioeconomic History    Marital status:      Spouse name: None    Number of children: None    Years of education: None    Highest education level: None   Tobacco Use    Smoking status: Never    Smokeless tobacco: Never   Substance and Sexual Activity    Alcohol use: Not Currently    Drug use: No       Family Hx:  Family History   Problem Relation Age of Onset    Cancer Maternal Grandmother          Review of Systems:       General: Denies headache, lethargy, fever, weight loss  Ears/Nose/Throat: Denies ear discharge, drainage, nosebleeds, hoarse voice, dental problems  Cardiovascular: Denies chest pain, shortness of breath  Lungs: Denies chest pain, breathing problems, wheezing, pneumonia  Stomach: Denies stomach pain, heartburn, constipation, irritable bowel  Skin: Denies rash, sores, open wounds  Musculoskeletal: Right shoulder pain  Genitourinary: Denies dysuria, hematuria, polyuria  Gastrointestinal: Denies constipation, obstipation, diarrhea  Neurological: Denies changes in sight, smell, hearing, taste, seizures. Denies loss of consciousness.  Psychiatric: Denies depression, sleep pattern changes, anxiety, change in personality  Endocrine: Denies mood swings, heat or cold intolerance  Hematologic/Lymphatic: Denies anemia,

## 2024-01-11 ENCOUNTER — TELEPHONE (OUTPATIENT)
Age: 49
End: 2024-01-11
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
